# Patient Record
Sex: FEMALE | Race: WHITE | NOT HISPANIC OR LATINO | Employment: OTHER | URBAN - METROPOLITAN AREA
[De-identification: names, ages, dates, MRNs, and addresses within clinical notes are randomized per-mention and may not be internally consistent; named-entity substitution may affect disease eponyms.]

---

## 2017-02-05 ENCOUNTER — HOSPITAL ENCOUNTER (OUTPATIENT)
Dept: MEDSURG UNIT | Facility: HOSPITAL | Age: 67
End: 2017-02-06
Attending: HOSPITALIST | Admitting: HOSPITALIST

## 2017-03-21 ENCOUNTER — HOSPITAL ENCOUNTER (OUTPATIENT)
Dept: MEDSURG UNIT | Facility: HOSPITAL | Age: 67
End: 2017-03-22
Attending: INTERNAL MEDICINE | Admitting: INTERNAL MEDICINE

## 2017-04-12 ENCOUNTER — HISTORICAL (OUTPATIENT)
Dept: RESPIRATORY THERAPY | Facility: HOSPITAL | Age: 67
End: 2017-04-12

## 2017-05-01 ENCOUNTER — HISTORICAL (OUTPATIENT)
Dept: INTERNAL MEDICINE | Facility: CLINIC | Age: 67
End: 2017-05-01

## 2017-05-04 ENCOUNTER — HISTORICAL (OUTPATIENT)
Dept: INTERNAL MEDICINE | Facility: CLINIC | Age: 67
End: 2017-05-04

## 2017-05-07 ENCOUNTER — HISTORICAL (OUTPATIENT)
Dept: SLEEP MEDICINE | Facility: HOSPITAL | Age: 67
End: 2017-05-07

## 2017-05-23 ENCOUNTER — HISTORICAL (OUTPATIENT)
Dept: SLEEP MEDICINE | Facility: HOSPITAL | Age: 67
End: 2017-05-23

## 2017-06-05 ENCOUNTER — HISTORICAL (OUTPATIENT)
Dept: INTERNAL MEDICINE | Facility: CLINIC | Age: 67
End: 2017-06-05

## 2017-06-13 ENCOUNTER — HISTORICAL (OUTPATIENT)
Dept: INTERNAL MEDICINE | Facility: CLINIC | Age: 67
End: 2017-06-13

## 2017-07-12 ENCOUNTER — HISTORICAL (OUTPATIENT)
Dept: INTERNAL MEDICINE | Facility: CLINIC | Age: 67
End: 2017-07-12

## 2017-09-06 ENCOUNTER — HISTORICAL (OUTPATIENT)
Dept: INTERNAL MEDICINE | Facility: CLINIC | Age: 67
End: 2017-09-06

## 2017-09-11 ENCOUNTER — HISTORICAL (OUTPATIENT)
Dept: RADIOLOGY | Facility: HOSPITAL | Age: 67
End: 2017-09-11

## 2017-12-01 ENCOUNTER — HISTORICAL (OUTPATIENT)
Dept: NEPHROLOGY | Facility: CLINIC | Age: 67
End: 2017-12-01

## 2017-12-01 LAB
ABS NEUT (OLG): 4.78 X10(3)/MCL (ref 2.1–9.2)
ALBUMIN SERPL-MCNC: 3.8 GM/DL (ref 3.4–5)
ALBUMIN/GLOB SERPL: 1 RATIO (ref 1–2)
ALP SERPL-CCNC: 114 UNIT/L (ref 45–117)
ALT SERPL-CCNC: 28 UNIT/L (ref 12–78)
APPEARANCE, UA: ABNORMAL
AST SERPL-CCNC: 25 UNIT/L (ref 15–37)
BACTERIA #/AREA URNS AUTO: ABNORMAL /[HPF]
BASOPHILS # BLD AUTO: 0.09 X10(3)/MCL
BASOPHILS NFR BLD AUTO: 1 % (ref 0–1)
BILIRUB SERPL-MCNC: 0.7 MG/DL (ref 0.2–1)
BILIRUB UR QL STRIP: NEGATIVE
BILIRUBIN DIRECT+TOT PNL SERPL-MCNC: 0.2 MG/DL
BILIRUBIN DIRECT+TOT PNL SERPL-MCNC: 0.5 MG/DL
BUN SERPL-MCNC: 22 MG/DL (ref 7–18)
CALCIUM SERPL-MCNC: 9.1 MG/DL (ref 8.5–10.1)
CHLORIDE SERPL-SCNC: 107 MMOL/L (ref 98–107)
CHOLEST SERPL-MCNC: 165 MG/DL
CHOLEST/HDLC SERPL: 2.5 {RATIO} (ref 0–4.4)
CO2 SERPL-SCNC: 28 MMOL/L (ref 21–32)
COLOR UR: YELLOW
CREAT SERPL-MCNC: 1.3 MG/DL (ref 0.6–1.3)
CREAT UR-MCNC: 191 MG/DL
DEPRECATED CALCIDIOL+CALCIFEROL SERPL-MC: 34.78 NG/ML (ref 30–80)
EOSINOPHIL # BLD AUTO: 0.34 X10(3)/MCL
EOSINOPHIL NFR BLD AUTO: 4 % (ref 0–5)
ERYTHROCYTE [DISTWIDTH] IN BLOOD BY AUTOMATED COUNT: 12.9 % (ref 11.5–14.5)
FERRITIN SERPL-MCNC: 68.3 NG/ML (ref 8–388)
GLOBULIN SER-MCNC: 3.6 GM/ML (ref 2.3–3.5)
GLUCOSE (UA): NORMAL
GLUCOSE SERPL-MCNC: 103 MG/DL (ref 74–106)
HCT VFR BLD AUTO: 43.2 % (ref 35–46)
HDLC SERPL-MCNC: 65 MG/DL
HGB BLD-MCNC: 14.1 GM/DL (ref 12–16)
HGB UR QL STRIP: NEGATIVE
HYALINE CASTS #/AREA URNS LPF: ABNORMAL /[LPF]
IMM GRANULOCYTES # BLD AUTO: 0.03 10*3/UL
IMM GRANULOCYTES NFR BLD AUTO: 0 %
IRON SATN MFR SERPL: 24.5 % (ref 15–50)
IRON SERPL-MCNC: 84 MCG/DL (ref 50–170)
KETONES UR QL STRIP: NEGATIVE
LDLC SERPL CALC-MCNC: 78 MG/DL (ref 0–130)
LEUKOCYTE ESTERASE UR QL STRIP: NEGATIVE
LYMPHOCYTES # BLD AUTO: 1.85 X10(3)/MCL
LYMPHOCYTES NFR BLD AUTO: 24 % (ref 15–40)
MAGNESIUM SERPL-MCNC: 2.1 MG/DL (ref 1.8–2.4)
MCH RBC QN AUTO: 31.6 PG (ref 26–34)
MCHC RBC AUTO-ENTMCNC: 32.6 GM/DL (ref 31–37)
MCV RBC AUTO: 96.9 FL (ref 80–100)
MONOCYTES # BLD AUTO: 0.65 X10(3)/MCL
MONOCYTES NFR BLD AUTO: 8 % (ref 4–12)
MUCOUS THREADS URNS QL MICRO: ABNORMAL
NEUTROPHILS # BLD AUTO: 4.78 X10(3)/MCL
NEUTROPHILS NFR BLD AUTO: 62 X10(3)/MCL
NITRITE UR QL STRIP: NEGATIVE
PH UR STRIP: 6 [PH] (ref 4.5–8)
PHOSPHATE SERPL-MCNC: 2.7 MG/DL (ref 2.5–4.9)
PLATELET # BLD AUTO: 221 X10(3)/MCL (ref 130–400)
PMV BLD AUTO: 10.2 FL (ref 7.4–10.4)
POTASSIUM SERPL-SCNC: 4.5 MMOL/L (ref 3.5–5.1)
PROT SERPL-MCNC: 7.4 GM/DL (ref 6.4–8.2)
PROT UR QL STRIP: 20 MG/DL
PROT UR STRIP-MCNC: 21.7 MG/DL
PROT/CREAT UR-RTO: 113.6 MG/GM
PTH-INTACT SERPL-MCNC: 35.4 PG/ML (ref 13.8–85)
RBC # BLD AUTO: 4.46 X10(6)/MCL (ref 4–5.2)
RBC #/AREA URNS AUTO: ABNORMAL /[HPF]
SODIUM SERPL-SCNC: 143 MMOL/L (ref 136–145)
SP GR UR STRIP: 1.02 (ref 1–1.03)
SQUAMOUS #/AREA URNS LPF: >100 /[LPF]
TIBC SERPL-MCNC: 343 MCG/DL (ref 250–450)
TRANSFERRIN SERPL-MCNC: 274 MG/DL (ref 200–360)
TRIGL SERPL-MCNC: 109 MG/DL
UROBILINOGEN UR STRIP-ACNC: NORMAL MG/DL
VLDLC SERPL CALC-MCNC: 22 MG/DL
WBC # SPEC AUTO: 7.7 X10(3)/MCL (ref 4.5–11)
WBC #/AREA URNS AUTO: ABNORMAL /HPF

## 2018-05-01 ENCOUNTER — HISTORICAL (OUTPATIENT)
Dept: INTERNAL MEDICINE | Facility: CLINIC | Age: 68
End: 2018-05-01

## 2018-05-01 LAB
ABS NEUT (OLG): 3.79 X10(3)/MCL (ref 2.1–9.2)
ALBUMIN SERPL-MCNC: 3.7 GM/DL (ref 3.4–5)
ALBUMIN/GLOB SERPL: 1 RATIO (ref 1–2)
ALP SERPL-CCNC: 117 UNIT/L (ref 45–117)
ALT SERPL-CCNC: 25 UNIT/L (ref 12–78)
APPEARANCE, UA: ABNORMAL
AST SERPL-CCNC: 23 UNIT/L (ref 15–37)
BACTERIA #/AREA URNS AUTO: ABNORMAL /[HPF]
BASOPHILS # BLD AUTO: 0.07 X10(3)/MCL
BASOPHILS NFR BLD AUTO: 1 %
BILIRUB SERPL-MCNC: 0.7 MG/DL (ref 0.2–1)
BILIRUB UR QL STRIP: ABNORMAL MG/DL
BILIRUBIN DIRECT+TOT PNL SERPL-MCNC: 0.2 MG/DL
BILIRUBIN DIRECT+TOT PNL SERPL-MCNC: 0.5 MG/DL
BUN SERPL-MCNC: 24 MG/DL (ref 7–18)
CALCIUM SERPL-MCNC: 8.4 MG/DL (ref 8.5–10.1)
CHLORIDE SERPL-SCNC: 111 MMOL/L (ref 98–107)
CO2 SERPL-SCNC: 26 MMOL/L (ref 21–32)
COLOR UR: YELLOW
CREAT SERPL-MCNC: 1.3 MG/DL (ref 0.6–1.3)
CREAT UR-MCNC: 210 MG/DL
DEPRECATED CALCIDIOL+CALCIFEROL SERPL-MC: 33.56 NG/ML (ref 30–80)
EOSINOPHIL # BLD AUTO: 0.55 10*3/UL
EOSINOPHIL NFR BLD AUTO: 8 %
ERYTHROCYTE [DISTWIDTH] IN BLOOD BY AUTOMATED COUNT: 12.3 % (ref 11.5–14.5)
GLOBULIN SER-MCNC: 3.6 GM/ML (ref 2.3–3.5)
GLUCOSE (UA): ABNORMAL MG/DL
GLUCOSE SERPL-MCNC: 94 MG/DL (ref 74–106)
HCT VFR BLD AUTO: 44.9 % (ref 35–46)
HGB BLD-MCNC: 14.7 GM/DL (ref 12–16)
HGB UR QL STRIP: ABNORMAL MG/DL
HYALINE CASTS #/AREA URNS LPF: ABNORMAL /[LPF]
IMM GRANULOCYTES # BLD AUTO: 0.01 10*3/UL
IMM GRANULOCYTES NFR BLD AUTO: 0 %
KETONES UR QL STRIP: ABNORMAL MG/DL
LEUKOCYTE ESTERASE UR QL STRIP: ABNORMAL LEU/UL
LYMPHOCYTES # BLD AUTO: 2.24 X10(3)/MCL
LYMPHOCYTES NFR BLD AUTO: 31 % (ref 13–40)
MAGNESIUM SERPL-MCNC: 1.9 MG/DL (ref 1.8–2.4)
MCH RBC QN AUTO: 32.5 PG (ref 26–34)
MCHC RBC AUTO-ENTMCNC: 32.7 GM/DL (ref 31–37)
MCV RBC AUTO: 99.1 FL (ref 80–100)
MONOCYTES # BLD AUTO: 0.58 X10(3)/MCL
MONOCYTES NFR BLD AUTO: 8 % (ref 4–12)
NEUTROPHILS # BLD AUTO: 3.79 X10(3)/MCL
NEUTROPHILS NFR BLD AUTO: 52 X10(3)/MCL
NITRITE UR QL STRIP: ABNORMAL
PH UR STRIP: 5.5 [PH] (ref 4.5–8)
PHOSPHATE SERPL-MCNC: 3.1 MG/DL (ref 2.5–4.9)
PLATELET # BLD AUTO: 221 X10(3)/MCL (ref 130–400)
PMV BLD AUTO: 10.5 FL (ref 7.4–10.4)
POTASSIUM SERPL-SCNC: 4.1 MMOL/L (ref 3.5–5.1)
PROT SERPL-MCNC: 7.3 GM/DL (ref 6.4–8.2)
PROT UR QL STRIP: 10 MG/DL
PROT UR STRIP-MCNC: 15.8 MG/DL
PROT/CREAT UR-RTO: 75.2 MG/GM
PTH-INTACT SERPL-MCNC: 62.3 PG/ML (ref 18.4–80.1)
RBC # BLD AUTO: 4.53 X10(6)/MCL (ref 4–5.2)
RBC #/AREA URNS AUTO: ABNORMAL /[HPF]
SODIUM SERPL-SCNC: 137 MMOL/L (ref 136–145)
SP GR UR STRIP: 1.02 (ref 1–1.03)
SQUAMOUS #/AREA URNS LPF: >100 /[LPF]
TSH SERPL-ACNC: 2.93 MIU/L (ref 0.36–3.74)
UROBILINOGEN UR STRIP-ACNC: NORMAL MG/DL
WBC # SPEC AUTO: 7.2 X10(3)/MCL (ref 4.5–11)
WBC #/AREA URNS AUTO: ABNORMAL /HPF

## 2018-06-15 ENCOUNTER — HISTORICAL (OUTPATIENT)
Dept: RADIOLOGY | Facility: HOSPITAL | Age: 68
End: 2018-06-15

## 2018-06-20 ENCOUNTER — HISTORICAL (OUTPATIENT)
Dept: ADMINISTRATIVE | Facility: HOSPITAL | Age: 68
End: 2018-06-20

## 2018-08-01 ENCOUNTER — HISTORICAL (OUTPATIENT)
Dept: NUTRITION | Facility: HOSPITAL | Age: 68
End: 2018-08-01

## 2018-10-24 ENCOUNTER — HISTORICAL (OUTPATIENT)
Dept: RADIOLOGY | Facility: HOSPITAL | Age: 68
End: 2018-10-24

## 2018-11-08 ENCOUNTER — HISTORICAL (OUTPATIENT)
Dept: ADMINISTRATIVE | Facility: HOSPITAL | Age: 68
End: 2018-11-08

## 2018-11-08 LAB
ABS NEUT (OLG): 4.34 X10(3)/MCL (ref 2.1–9.2)
ALBUMIN SERPL-MCNC: 3.9 GM/DL (ref 3.4–5)
ALBUMIN/GLOB SERPL: 1 RATIO (ref 1–2)
ALP SERPL-CCNC: 127 UNIT/L (ref 45–117)
ALT SERPL-CCNC: 31 UNIT/L (ref 12–78)
APPEARANCE, UA: CLEAR
AST SERPL-CCNC: 26 UNIT/L (ref 15–37)
BACTERIA #/AREA URNS AUTO: ABNORMAL /[HPF]
BASOPHILS # BLD AUTO: 0.06 X10(3)/MCL
BASOPHILS NFR BLD AUTO: 1 %
BILIRUB SERPL-MCNC: 0.6 MG/DL (ref 0.2–1)
BILIRUB UR QL STRIP: NEGATIVE
BILIRUBIN DIRECT+TOT PNL SERPL-MCNC: 0.2 MG/DL
BILIRUBIN DIRECT+TOT PNL SERPL-MCNC: 0.4 MG/DL
BUN SERPL-MCNC: 25 MG/DL (ref 7–18)
CALCIUM SERPL-MCNC: 9.2 MG/DL (ref 8.5–10.1)
CHLORIDE SERPL-SCNC: 107 MMOL/L (ref 98–107)
CO2 SERPL-SCNC: 28 MMOL/L (ref 21–32)
COLOR UR: YELLOW
CREAT SERPL-MCNC: 1.4 MG/DL (ref 0.6–1.3)
CREAT UR-MCNC: 243 MG/DL
DEPRECATED CALCIDIOL+CALCIFEROL SERPL-MC: 40.08 NG/ML (ref 30–80)
EOSINOPHIL # BLD AUTO: 0.54 10*3/UL
EOSINOPHIL NFR BLD AUTO: 7 %
ERYTHROCYTE [DISTWIDTH] IN BLOOD BY AUTOMATED COUNT: 12.2 % (ref 11.5–14.5)
GLOBULIN SER-MCNC: 3.9 GM/ML (ref 2.3–3.5)
GLUCOSE (UA): NORMAL
GLUCOSE SERPL-MCNC: 102 MG/DL (ref 74–106)
HCT VFR BLD AUTO: 46.5 % (ref 35–46)
HGB BLD-MCNC: 15 GM/DL (ref 12–16)
HGB UR QL STRIP: NEGATIVE
HYALINE CASTS #/AREA URNS LPF: ABNORMAL /[LPF]
IMM GRANULOCYTES # BLD AUTO: 0.02 10*3/UL
IMM GRANULOCYTES NFR BLD AUTO: 0 %
KETONES UR QL STRIP: NEGATIVE
LEUKOCYTE ESTERASE UR QL STRIP: NEGATIVE
LYMPHOCYTES # BLD AUTO: 2.11 X10(3)/MCL
LYMPHOCYTES NFR BLD AUTO: 27 % (ref 13–40)
MAGNESIUM SERPL-MCNC: 2.1 MG/DL (ref 1.8–2.4)
MCH RBC QN AUTO: 31.8 PG (ref 26–34)
MCHC RBC AUTO-ENTMCNC: 32.3 GM/DL (ref 31–37)
MCV RBC AUTO: 98.7 FL (ref 80–100)
MONOCYTES # BLD AUTO: 0.76 X10(3)/MCL
MONOCYTES NFR BLD AUTO: 10 % (ref 4–12)
NEUTROPHILS # BLD AUTO: 4.34 X10(3)/MCL
NEUTROPHILS NFR BLD AUTO: 55 X10(3)/MCL
NITRITE UR QL STRIP: NEGATIVE
PH UR STRIP: 5.5 [PH] (ref 4.5–8)
PHOSPHATE SERPL-MCNC: 3 MG/DL (ref 2.5–4.9)
PLATELET # BLD AUTO: 235 X10(3)/MCL (ref 130–400)
PMV BLD AUTO: 10.2 FL (ref 7.4–10.4)
POTASSIUM SERPL-SCNC: 4.8 MMOL/L (ref 3.5–5.1)
PROT SERPL-MCNC: 7.8 GM/DL (ref 6.4–8.2)
PROT UR QL STRIP: 10 MG/DL
PROT UR STRIP-MCNC: 43.9 MG/DL
PROT/CREAT UR-RTO: 180.7 MG/GM
PTH-INTACT SERPL-MCNC: 54.7 PG/ML (ref 18.4–80.1)
RBC # BLD AUTO: 4.71 X10(6)/MCL (ref 4–5.2)
RBC #/AREA URNS AUTO: ABNORMAL /[HPF]
SODIUM SERPL-SCNC: 140 MMOL/L (ref 136–145)
SP GR UR STRIP: 1.03 (ref 1–1.03)
SQUAMOUS #/AREA URNS LPF: >100 /[LPF]
UROBILINOGEN UR STRIP-ACNC: NORMAL
WBC # SPEC AUTO: 7.8 X10(3)/MCL (ref 4.5–11)
WBC #/AREA URNS AUTO: ABNORMAL /HPF

## 2018-12-10 ENCOUNTER — HISTORICAL (OUTPATIENT)
Dept: INTERNAL MEDICINE | Facility: CLINIC | Age: 68
End: 2018-12-10

## 2018-12-10 LAB
ALBUMIN SERPL-MCNC: 3.7 GM/DL (ref 3.4–5)
ALBUMIN/GLOB SERPL: 1 RATIO (ref 1–2)
ALP SERPL-CCNC: 131 UNIT/L (ref 45–117)
ALT SERPL-CCNC: 28 UNIT/L (ref 12–78)
AST SERPL-CCNC: 26 UNIT/L (ref 15–37)
BILIRUB SERPL-MCNC: 0.4 MG/DL (ref 0.2–1)
BILIRUBIN DIRECT+TOT PNL SERPL-MCNC: 0.1 MG/DL
BILIRUBIN DIRECT+TOT PNL SERPL-MCNC: 0.3 MG/DL
BUN SERPL-MCNC: 26 MG/DL (ref 7–18)
CALCIUM SERPL-MCNC: 8.7 MG/DL (ref 8.5–10.1)
CHLORIDE SERPL-SCNC: 108 MMOL/L (ref 98–107)
CHOLEST SERPL-MCNC: 164 MG/DL
CHOLEST/HDLC SERPL: 3.2 {RATIO} (ref 0–4.4)
CO2 SERPL-SCNC: 28 MMOL/L (ref 21–32)
CREAT SERPL-MCNC: 1.4 MG/DL (ref 0.6–1.3)
EST. AVERAGE GLUCOSE BLD GHB EST-MCNC: 117 MG/DL
GLOBULIN SER-MCNC: 3.8 GM/ML (ref 2.3–3.5)
GLUCOSE SERPL-MCNC: 105 MG/DL (ref 74–106)
HBA1C MFR BLD: 5.7 % (ref 4.2–6.3)
HDLC SERPL-MCNC: 52 MG/DL
LDLC SERPL CALC-MCNC: 83 MG/DL (ref 0–130)
POTASSIUM SERPL-SCNC: 4.3 MMOL/L (ref 3.5–5.1)
PROT SERPL-MCNC: 7.5 GM/DL (ref 6.4–8.2)
SODIUM SERPL-SCNC: 140 MMOL/L (ref 136–145)
TRIGL SERPL-MCNC: 147 MG/DL
VLDLC SERPL CALC-MCNC: 29 MG/DL

## 2019-01-11 ENCOUNTER — HISTORICAL (OUTPATIENT)
Dept: INTERNAL MEDICINE | Facility: CLINIC | Age: 69
End: 2019-01-11

## 2019-01-11 LAB
ABS NEUT (OLG): 4.87 X10(3)/MCL (ref 2.1–9.2)
ALBUMIN SERPL-MCNC: 3.8 GM/DL (ref 3.4–5)
ALBUMIN/GLOB SERPL: 1 RATIO (ref 1–2)
ALP SERPL-CCNC: 119 UNIT/L (ref 45–117)
ALT SERPL-CCNC: 25 UNIT/L (ref 12–78)
AST SERPL-CCNC: 22 UNIT/L (ref 15–37)
BASOPHILS # BLD AUTO: 0.09 X10(3)/MCL
BASOPHILS NFR BLD AUTO: 1 %
BILIRUB SERPL-MCNC: 0.5 MG/DL (ref 0.2–1)
BILIRUBIN DIRECT+TOT PNL SERPL-MCNC: 0.2 MG/DL
BILIRUBIN DIRECT+TOT PNL SERPL-MCNC: 0.3 MG/DL
BUN SERPL-MCNC: 22 MG/DL (ref 7–18)
CALCIUM SERPL-MCNC: 8.8 MG/DL (ref 8.5–10.1)
CHLORIDE SERPL-SCNC: 108 MMOL/L (ref 98–107)
CO2 SERPL-SCNC: 30 MMOL/L (ref 21–32)
CREAT SERPL-MCNC: 1.3 MG/DL (ref 0.6–1.3)
EOSINOPHIL # BLD AUTO: 0.56 10*3/UL
EOSINOPHIL NFR BLD AUTO: 6 %
ERYTHROCYTE [DISTWIDTH] IN BLOOD BY AUTOMATED COUNT: 12.1 % (ref 11.5–14.5)
GGT SERPL-CCNC: 28 UNIT/L (ref 5–85)
GLOBULIN SER-MCNC: 3.7 GM/ML (ref 2.3–3.5)
GLUCOSE SERPL-MCNC: 107 MG/DL (ref 74–106)
HCT VFR BLD AUTO: 47 % (ref 35–46)
HGB BLD-MCNC: 15.1 GM/DL (ref 12–16)
IMM GRANULOCYTES # BLD AUTO: 0.02 10*3/UL
IMM GRANULOCYTES NFR BLD AUTO: 0 %
LYMPHOCYTES # BLD AUTO: 2.25 X10(3)/MCL
LYMPHOCYTES NFR BLD AUTO: 26 % (ref 13–40)
MCH RBC QN AUTO: 32.1 PG (ref 26–34)
MCHC RBC AUTO-ENTMCNC: 32.1 GM/DL (ref 31–37)
MCV RBC AUTO: 99.8 FL (ref 80–100)
MONOCYTES # BLD AUTO: 0.8 X10(3)/MCL
MONOCYTES NFR BLD AUTO: 9 % (ref 4–12)
NEUTROPHILS # BLD AUTO: 4.87 X10(3)/MCL
NEUTROPHILS NFR BLD AUTO: 57 X10(3)/MCL
PLATELET # BLD AUTO: 232 X10(3)/MCL (ref 130–400)
PMV BLD AUTO: 10.6 FL (ref 7.4–10.4)
POTASSIUM SERPL-SCNC: 4.4 MMOL/L (ref 3.5–5.1)
PROT SERPL-MCNC: 7.5 GM/DL (ref 6.4–8.2)
RBC # BLD AUTO: 4.71 X10(6)/MCL (ref 4–5.2)
SODIUM SERPL-SCNC: 141 MMOL/L (ref 136–145)
WBC # SPEC AUTO: 8.6 X10(3)/MCL (ref 4.5–11)

## 2019-02-14 ENCOUNTER — HISTORICAL (OUTPATIENT)
Dept: RADIOLOGY | Facility: HOSPITAL | Age: 69
End: 2019-02-14

## 2019-03-29 ENCOUNTER — HISTORICAL (OUTPATIENT)
Dept: ADMINISTRATIVE | Facility: HOSPITAL | Age: 69
End: 2019-03-29

## 2019-04-08 ENCOUNTER — HISTORICAL (OUTPATIENT)
Dept: RADIOLOGY | Facility: HOSPITAL | Age: 69
End: 2019-04-08

## 2019-05-06 ENCOUNTER — HISTORICAL (OUTPATIENT)
Dept: ADMINISTRATIVE | Facility: HOSPITAL | Age: 69
End: 2019-05-06

## 2019-06-14 ENCOUNTER — HISTORICAL (OUTPATIENT)
Dept: RESPIRATORY THERAPY | Facility: HOSPITAL | Age: 69
End: 2019-06-14

## 2019-10-09 ENCOUNTER — HISTORICAL (OUTPATIENT)
Dept: NEPHROLOGY | Facility: CLINIC | Age: 69
End: 2019-10-09

## 2019-10-25 ENCOUNTER — HISTORICAL (OUTPATIENT)
Dept: RADIOLOGY | Facility: HOSPITAL | Age: 69
End: 2019-10-25

## 2019-11-06 ENCOUNTER — HISTORICAL (OUTPATIENT)
Dept: LAB | Facility: HOSPITAL | Age: 69
End: 2019-11-06

## 2019-12-02 ENCOUNTER — HISTORICAL (OUTPATIENT)
Dept: INTERNAL MEDICINE | Facility: CLINIC | Age: 69
End: 2019-12-02

## 2020-07-15 ENCOUNTER — HISTORICAL (OUTPATIENT)
Dept: NEPHROLOGY | Facility: CLINIC | Age: 70
End: 2020-07-15

## 2020-07-22 ENCOUNTER — HISTORICAL (OUTPATIENT)
Dept: INTERNAL MEDICINE | Facility: CLINIC | Age: 70
End: 2020-07-22

## 2020-10-30 ENCOUNTER — HISTORICAL (OUTPATIENT)
Dept: RADIOLOGY | Facility: HOSPITAL | Age: 70
End: 2020-10-30

## 2021-01-18 ENCOUNTER — HISTORICAL (OUTPATIENT)
Dept: NEPHROLOGY | Facility: CLINIC | Age: 71
End: 2021-01-18

## 2021-07-08 ENCOUNTER — HISTORICAL (OUTPATIENT)
Dept: ADMINISTRATIVE | Facility: HOSPITAL | Age: 71
End: 2021-07-08

## 2021-07-13 ENCOUNTER — HISTORICAL (OUTPATIENT)
Dept: SURGERY | Facility: HOSPITAL | Age: 71
End: 2021-07-13

## 2021-07-20 ENCOUNTER — HISTORICAL (OUTPATIENT)
Dept: NEPHROLOGY | Facility: CLINIC | Age: 71
End: 2021-07-20

## 2021-08-04 ENCOUNTER — HISTORICAL (OUTPATIENT)
Dept: ADMINISTRATIVE | Facility: HOSPITAL | Age: 71
End: 2021-08-04

## 2021-08-05 ENCOUNTER — HISTORICAL (OUTPATIENT)
Dept: SURGERY | Facility: HOSPITAL | Age: 71
End: 2021-08-05

## 2021-11-04 ENCOUNTER — HISTORICAL (OUTPATIENT)
Dept: RADIOLOGY | Facility: HOSPITAL | Age: 71
End: 2021-11-04

## 2022-01-19 ENCOUNTER — HISTORICAL (OUTPATIENT)
Dept: INTERNAL MEDICINE | Facility: CLINIC | Age: 72
End: 2022-01-19

## 2022-04-07 ENCOUNTER — HISTORICAL (OUTPATIENT)
Dept: ADMINISTRATIVE | Facility: HOSPITAL | Age: 72
End: 2022-04-07

## 2022-04-23 VITALS
BODY MASS INDEX: 36.1 KG/M2 | SYSTOLIC BLOOD PRESSURE: 138 MMHG | OXYGEN SATURATION: 94 % | WEIGHT: 211.44 LBS | DIASTOLIC BLOOD PRESSURE: 88 MMHG | HEIGHT: 64 IN

## 2022-05-01 NOTE — HISTORICAL [facility] CERNER
This is a historical note converted from Tiffanie. Formatting and pictures may have been removed.  Please reference Tiffanie for original formatting and attached multimedia. Procedure Name  Date/Time:6/20/2018 11:13:03  Procedure:??Right?Knee Injection  Indications:??Diagnostic and Therapeutic Indication - decrease pain, increase range of motion and improve quality of life  RISKS: Possible complications with injection include?bleeding, infection (0.01%), tendon rupture, steroid flare, fat pad or soft tissue atrophy, skin depigmentation, and vasovagal response. ?(Steroid flair treatment is rest, ice, NSAIDs and resolves in 24-36 hours.)  Consent:???Procedure, risks, benefits, & alternatives explained to patient, who voiced understanding and agreed to proceed with procedure. ?Consent signed and?scanned into the medical record. No absolute contraindications (cellulitis overlying joint, infection, lack of informed consent, allergy to injection mediation, justo protein or egg allergy for sodium hyaluronate, or history of steroid flare) or relative contraindications (brittle or out of control DM HgA1c > 10, coagulopathy INR > 3.5, previous joint replacement, or history of AVN).  Description:?Time out performed. The patient was prepped?using Chlorhexidine scrub after the appropriate?identification of anatomic landmarks.? Sterile needle used (size # 21 gauge, length 1.5 inch)?Topical anesthetic of ethyl chloride was used.? ?5 mL of 1% lidocaine plain with 40 mg of Kenalog injected.  Complications:?None?  EBL:??None  Post Procedure:?Patient reports improvement in pain and ROM. Patient alert, moving all extremities. ?Good peripheral pulses, no signs of vascular compromise, range of motion intact. ?Patient tolerated procedure well. ?Aftercare instructions were given to patient at time of discharge.??Relative rest for 3 days - avoiding excessive activity. ?Pendulum stretching exercises followed by stretching exercises  daily?starting on day 4.? Place ice on area for 15 minutes every 4 to 6 hours.??Tylenol 1000mg twice a day or ibuprofen 600 mg three times per day for next 3-4 days if not on medication already. ?Protect the area for the next 1-8 hours if anesthetic was used. ?Avoid excessive activity for next 3 to 4 weeks.?ER precautions for fever, severe joint pain, or allergic reaction or other new symptoms related to joint injection.

## 2022-05-01 NOTE — HISTORICAL [facility] CERNER
This is a historical note converted from Tiffanie. Formatting and pictures may have been removed.  Please reference Tiffanie for original formatting and attached multimedia. Chief Complaint  Referred for Rt. knee pain  History of Present Illness  67?yo?female?non-smoker?presents to ortho clinic for?routine follow up?for?right?knee pain.? Patient points to ?medial knee.  Onset: ?Insidious over years??progressively worsening  Current pain level: 4/10??without pain medication. Quality described as?sharp?Patient reports using?RX / OTC?(Tramadol RX per PCP)?medications PRN pain with?mild?relief.? Patient?denies?use of pain medication today.?Patient will not be able to get more Tramadol as requesting medication, will RX compound cream for symptom relief.  Modifying Factors: ?Worse with/after activity;Improved with rest;??Stiffness after immobilization??Stiffness improved with less than 30 minutes of activity  Previous treatment:Conservative treatment for at least 3 months with HEP/ medications.?CSI x 1 in past with good relief lasting short period, requesting another injection today.  Previous injuries:?Denies  Associated Symptoms:?Crepitus/Grinding; ?No numbness or tingling;??swelling with?activity;?No skin changes;?No weakness;?Mild decrease in ROM  Activity:?Sedentary, full ADLs;?Pain interferes with function/daily activity (mild)?Patient?denies?use of assistive devices for assistance with ambulation.  Family History:?Family history of arthritis  PCP:?Soledad Delatorre NP [facility]  Employment:Worked in a lab now retired.  ?   NOTE: No NSAIDs due to having donated a kidney in past?and can not take.  Review of Systems  Constitutional:No fever;No chills;No weight loss  CV:No swelling;No edema  GI:No urinary retention;No urinary incontinence  :No fecal incontinence  Skin:No rash;No wound  Neuro:No numbness/tingling;No weakness;No saddle anesthesia  MSK: As above  Psych:No depression;No anxiety  Heme/Lymph:No easy bruising;No  easy bleeding;No lymphadenopathy  Immuno:No MRSA history  Physical Exam  Vitals & Measurements  HT:?160.02?cm? HT:?160.02?cm? WT:?95.4?kg? WT:?95.4?kg? BMI:?37.26?  MSK: ?Right??KNEE  General: No apparent distress;?obese  Inspection:?Normal gait/ station;??full weight bearing;??no swelling;?no erythema;?No contusion;?atrophy / deconditioning noted- mild quad;?normal alignment?  Palpation:?tenderness noted at lateral and medial joint lines;?Crepitus:?Positive;?Normal temperature  ROM:?  ?????Active Extension/Flexion (0-140):?5-115  Strength:?  ?????Flexion??4/5  ?????Extension??4/5  Special Tests:  ?????Ballotable Effusion: ?Negative  ?????Fluid Wave:?Negative  ?????Anterior Drawer:Negative  ?????Posterior Drawer:?Negative  ?????Patellar Grind: ?Negative  ?????Cori:?Negative?  ?????Apleys Compression:?Negative  Neurovascular:?Intact; 2+?distal pulse, sensation intact to light touch  Neuro/Psych: Awake, Alert, Oriented; Normal mood and affect  Lymphatic: No LAD  Skin and Soft Tissue: No bruising, No ecchymosis; No rash; Skin intact  Assessment/Plan  1.?Osteoarthritis of right knee  ?1.? Discussed with patient diagnosis and treatment recommendations.? Handout given.  2.? Imaging:?Radiological studies ordered and independently reviewed; discussed with patient.  3.? Treatment plan: Conservative treatment to include oral glucosamine 1500 mg/day; Topical capsaicin as needed; Hot or cold therapy; Adequate vitamin C/D intake  4.? Procedure:?Discussed CSI vs VS injections as treatment options; since conservative measures did not improve symptoms patient consented for CSI today  5.? Activity:?Activity as tolerated; HEP to included aerobic conditioning with non-painful activity and ROM/STG exercises; proper footwear; assistive device to avoid limping;rubber band provided for HEP and soft knee splint provided for PRN comfort  6.? Therapy: patient will try water aerobics  7.? Medication:?First line treatment with  daily?acetaminophen 1000 mg three times daily; Medication precautions given; RX topical compound cream, can not take NSAIDs.  8.? RTC:?3 months  9.? Additional work-up:?None  Ordered:  Lidocaine inj., 5 mL, form: Injection, Intra-Articular, Once, first dose 06/20/18 11:03:00 CDT, stop date 06/20/18 11:03:00 CDT  triamcinolone, 40 mg, form: Injection, Intra-Articular, Once, first dose 06/20/18 11:03:00 CDT, stop date 06/20/18 11:03:00 CDT  asp/inj jnt/bursa, major 20610 PC, 06/20/18 11:03:00 CDT, [facility], Routine, 06/20/18 11:03:00 CDT  Clinic Follow up, *Est. 09/20/18 3:00:00 CDT, Order for future visit, Osteoarthritis of right knee, [facility]  Office/Outpatient Visit Level 4 Established 22027 PC, 25, Osteoarthritis of right knee, [facility], 06/20/18 11:03:00 CDT  XR Knee Right 4 or More Views, Routine, 06/20/18 11:00:00 CDT, Pain, None, Stretcher, Patient Has IV?, Patient on Oxygen?, Rad Type, Osteoarthritis of right knee, 06/20/18 11:00:00 CDT  ?  3.?HTN - Hypertension  1.??Educated patient to reduce salt in diet?and?limit alcohol consumption to less than 1 ounce per day  2.? Patient instructed?to take?medications that have been prescribed for HTN?as instructed  3.? Follow up with PCP for management and care of HTN?  ?  Orders:  Misc Prescription, keto/flex/lido gel 10%/2%/5%, 1 mL, TOP, TID, 4 clicks (1mL) to affected area TID as needed (Dr. Bledsoe), # 30 gm, 5 Refill(s), Pharmacy: [facility]   Problem List/Past Medical History  Ongoing  Anxiety  Arthritis(  Confirmed  )  Chronic bronchitis  Chronic bronchitis(  Confirmed  )  Chronic renal disease(  Confirmed  )  CKD (chronic kidney disease) stage 3, GFR 30-59 ml/min  COPD with acute exacerbation  HLD - Hyperlipidemia  HTN (hypertension)(  Confirmed  )  HTN - Hypertension  Hyperlipidemia(  Confirmed  )  Obesity  DARRON (obstructive sleep apnea)  Psoriasis  Tobacco user  Vitamin D deficiency  Historical  No qualifying  data  Procedure/Surgical History  Removal of kidney from donor (2002), Total hysterectomy (1997), Hysterectomy, Kidney - local excision.  Medications  Advair Diskus 100 mcg-50 mcg inhalation powder, 1 puff(s), INH, BID, 6 refills  albuterol 3 mg- ipratropium 0.5 mg/3 mL inhalation NEB solution, 3 mL, INH, QID, 6 refills  aspirin 81 mg oral tablet, CHEWABLE, 1 TABLET, Oral, Daily, 4 refills  atorvastatin 40 mg oral tablet, 40 mg= 1 tab(s), Oral, Daily, 2 refills  Calcium 600+D, Oral, Daily  Home concentrator and portable @ 2L per nasal cannula continuous to keep sats at 88-92%, See Instructions  IPRATROPIUM/ALBUTERO 0.5-3MG/3ML SOL, NEB, QID  Kenalog-40 injectable suspension, 40 mg= 1 mL, Intra-Articular, Once  keto/flex/lido gel 10%/2%/5%, 1 mL, TOP, TID, 5 refills  Lidocaine 1% PF 5ml inj, 5 mL, Intra-Articular, Once  losartan 25 mg oral tablet, 25 mg= 1 tab(s), Oral, Daily, 2 refills  Nebulizer Machine, See Instructions  ProAir HFA 90 mcg/inh inhalation aerosol with adapter, 1 PUFF, INH, q6hr, 6 refills  Spiriva 18 mcg inhalation capsule, 18 mcg= 1 cap(s), INH, Daily, 7 refills  traMADOL HCL 50MG TAB, 50 mg= 1 tab(s), Oral, q4hr  triamcinolone 0.1% topical cream, 1 daria, TOP, TID, 3 refills  Zyrtec 10 mg oral tablet, 1 TABLET, Oral, Daily  Allergies  No Known Medication Allergies  Social History  Alcohol - Denies Alcohol Use, 06/25/2014  Never, 06/20/2016  Employment/School  Retired, Highest education level: Some college. Operates hazardous equipment: No., 11/20/2015  Exercise  Exercise frequency: 1-2 times/week. Self assessment: Good condition. Exercise type: Walking., 11/20/2015  Home/Environment  Lives with Spouse. Living situation: Home/Independent. Alcohol abuse in household: No. Substance abuse in household: No. Smoker in household: No. Injuries/Abuse/Neglect in household: No. Feels unsafe at home: No. Family/Friends available for support: Yes. Concern for family members at home: No. Major illness in  household: No. Financial concerns: No. TV/Computer concerns: No., 11/20/2015  Nutrition/Health  Type of diet: low sodium., 02/09/2018  cardiac diet, Wants to lose weight: No. Sleeping concerns: No. Feels highly stressed: No., 11/20/2015  Substance Abuse - Denies Substance Abuse, 06/25/2014  Never, 06/20/2016  Tobacco  Former smoker, 02/05/2017  Family History  Acute myocardial infarction.: Father.  Alcoholism.: Brother.  Cancer: Mother.  Heart failure.: Father.  Immunizations  Vaccine Date Status Comments   influenza virus vaccine, inactivated 10/27/2017 Given    pneumococcal 23-polyvalent vaccine 05/30/2017 Given    tetanus/diphtheria/pertussis, acel(Tdap) 01/05/2017 Given    influenza virus vaccine, inactivated 10/15/2016 Recorded    influenza virus vaccine, inactivated 11/20/2015 Given pt kishan well voices no complaints. no visible distress noted   pneumococcal vacc 10/11/2006 Recorded    Health Maintenance  Health Maintenance  ???Pending?(in the next year)  ??? ??OverDue  ??? ? ? ?Alcohol Misuse Screening due??01/05/18??and every 1??year(s)  ??? ??Due?  ??? ? ? ?Colorectal Screening due??06/13/18??and every 1??year(s)  ??? ? ? ?Functional Assessment due??06/20/18??and every 1??year(s)  ??? ? ? ?Pneumococcal Vaccine due??06/20/18??and every 100??year(s)  ??? ??Due In Future?  ??? ? ? ?Influenza Vaccine not due until??10/02/18??and every 1??year(s)  ??? ? ? ?Smoking Cessation not due until??10/25/18??and every 1??year(s)  ??? ? ? ?Hypertension Management-Blood Pressure not due until??11/30/18??and every 6??month(s)  ??? ? ? ?Hypertension Management-BMP not due until??05/01/19??and every 1??year(s)  ??? ? ? ?Aspirin Therapy for CVD Prevention not due until??05/31/19??and every 1??year(s)  ??? ? ? ?Hypertension Management-Education not due until??05/31/19??and every 1??year(s)  ??? ? ? ?Blood Pressure Screening not due until??05/31/19??and every 1??year(s)  ??? ? ? ?Depression Screening not due until??05/31/19??and  every 1??year(s)  ???Satisfied?(in the past 1 year)  ??? ??Satisfied?  ??? ? ? ?Aspirin Therapy for CVD Prevention on??05/31/18.??Satisfied by Soledad Chaudhari??Reason: Expectation Satisfied Elsewhere  ??? ? ? ?Blood Pressure Screening on??05/31/18.??Satisfied by Chanelle Larson LPN S.  ??? ? ? ?Body Mass Index Check on??06/20/18.??Satisfied by Maryanne Chan LPN  ??? ? ? ?Bone Density Screening on??06/15/18.??Satisfied by Ailin Hobson  ??? ? ? ?Breast Cancer Screening on??07/12/17.??Satisfied by Sneha Trejo  ??? ? ? ?Depression Screening on??05/31/18.??Satisfied by Chanelle Larson LPN S.  ??? ? ? ?Diabetes Screening on??11/30/18.??Satisfied by Soledad Chaudhari  ??? ? ? ?Fall Risk Assessment on??06/20/18.??Satisfied by Maryanne Chan LPN  ??? ? ? ?Hypertension Management-Blood Pressure on??05/31/18.??Satisfied by Chanelle Larson LPN S.  ??? ? ? ?Influenza Vaccine on??10/27/17.??Satisfied by Nancy Brenner LPN  ??? ? ? ?Lipid Screening on??12/01/17.??Satisfied by Geno Escalona  ??? ? ? ?Obesity Screening on??06/20/18.??Satisfied by Maryanne Chan LPN  ??? ? ? ?Smoking Cessation on??10/25/17.??Satisfied by Maryanne Chan LPN  ??? ? ? ?Tobacco Use Screening on??06/20/18.??Satisfied by Maryanne Chan LPN  ?  ?  Diagnostic Results  XR right knee completed 6/20/18; DJD noted; awaiting radiologist findings      6/20/18 Radiology Report  Right knee 3 views.  HISTORY: Pain.  FINDINGS: Examination reveals normal mineralization of the visualized  osseous structures there is narrowing of the medial compartment of the  knee joint with tiny marginal osteophytes articular spaces are  otherwise preserved with smooth articular surfaces no acute fractures  or dislocations identified.  IMPRESSION: Degenerative changes with narrowing of the medial  compartment of the knee joint

## 2022-05-01 NOTE — HISTORICAL [facility] C[facility]ER
This is a historical note converted from Tiffanie. Formatting and pictures may have been removed.  Please reference Cernestor for original formatting and attached multimedia. Chief Complaint  Headaches, yeast infection  History of Present Illness  68 y.o.  female with PMHx significant for HTN, HLD, CKD Stg III, Solitary Kidney (kidney donor), COPD and DARRON, presenting with c/o HA, chills, and fever. She was seen in the ED 3/21/19 with the same complaints + sinus pressure/congestion and was diagnosed with sinusitis. States that symptoms actually began ~ 2-3 days prior to visit.?Influenza testing was negative. Pt was prescribed Augmentin 875 1 tab po BID x 7 days and Fluticasone nasal spray for use BID. After that visit, she was seen by her dentist to have teeth pulled. Dentist also prescribed Amoxicillin and gave her prescription for Norco. Despite the abx therapy, pt continues with c/o HAs that begin in the occipital region on the left side. Describes pain as a deep, excruciating ache. Unrelieved with OTC medications such as Tylenol and Ibuprofen. States that Norco is the only thing that gave her some relief, however, she does not wish to take the Norco all the time.? Denies changes in vision, thunderclap onset, N/V, awakening in the middle of the night, HA upon awakening, confusion or mental status/LOC changes. She states that she remembers years ago while living in [place], she experienced the exact same type of HA, only on both sides of her head in the occipital region. She reports that the doctors there prescribed her some anti-seizure medication and within 2 days, the HA resolved. She continues with low-grade fevers/chills that mainly occur at night. Afebrile at present. She does have a cough but attributes the cough to her COPD. Pt c/o thick, white vaginal discharge and vaginal itching that began after starting the Augmentin. Denies CP or SOB. No other problems stated.  ?  Review of Systems  Constitutional:?no  weakness; + fever, fatigue  Eye:?no vision loss, eye redness, drainage, or pain  ENMT:?no sore throat, ear pain,?sinus pain/congestion, nasal congestion/drainage  Respiratory:?+ cough, no wheezing, no shortness of breath  Cardiovascular:?no chest pain, no palpitations, no edema  Gastrointestinal:?no nausea, vomiting, or diarrhea. No abdominal pain  Genitourinary:?no dysuria, no urinary frequency or urgency, no hematuria; + vaginal discharge/itching  Hema/Lymph:?no abnormal bruising or bleeding  Endocrine:?no heat or cold intolerance, no excessive thirst or excessive urination  Musculoskeletal:?no muscle or joint pain, no joint swelling  Integumentary:?no skin rash or abnormal lesion  Neurologic: no headache, no seizure, no dizziness, no weakness or numbness  Physical Exam  Vitals & Measurements  T:?36.5? ?C (Oral)? HR:?88(Peripheral)? RR:?20? BP:?125/75?  HT:?159?cm? WT:?95.7?kg? BMI:?37.85?  General:?well-developed, well-nourished, no acute distress  Eye: PERRLA, EOMI, clear conjunctiva, eyelids normal  HENT:?TMs/ear canals clear, oropharynx with mild?erythema, no exudate,?mucosal surfaces moist, no maxillary/frontal sinus tenderness to palpation  Neck: full range of motion, no thyromegaly or lymphadenopathy  Respiratory:?Fine crackles noted to posterior left lower lobe. Lungs otherwise CTA. No wheezing. Chest expansion symmetrical  Cardiovascular:?regular rate and rhythm without murmurs, gallops, or rubs, peripheral pulses normal. No peripheral swelling  Gastrointestinal:?soft, non-tender, non-distended with normal bowel sounds  Musculoskeletal:?full range of motion of all extremities/spine without limitation or discomfort.?Normal strength and tone  Integumentary: no rashes or skin lesions present  Neurologic: cranial nerves I-XII?intact, no signs of peripheral neurological deficit, motor/sensory function intact  Psychological: Calm and cooperative. Affect and Mood appropriate. Judgement  intact  Assessment/Plan  Candidal vaginitis  Fluconazole 150 mg po x 1  May repeat dose in 72 hrs if symptoms not resolved  Ordered:  1160F- Medication reconciliation completed during visit  Office/Outpatient Visit Level 3 Established 10275 PC  ?  Fever and chills  BMP  CBC  Mycoplasma  CXR today?  Ordered:  1160F- Medication reconciliation completed during visit  Basic Metabolic Panel  CBC w/ Auto Diff  Mycoplasma pneumoniae, IgG Ab-[facility] [id number]  Office/Outpatient Visit Level 3 Established 93592 PC  XR Chest 2 Views  ?  Occipital headache  Headache: Denies changes in vision, thunderclap onset, N/V, awakening in the middle of the night, HA upon awakening, confusion or mental status/LOC changes  Keep a daily HA log. If greater than 3 HAs/week, will consider prophylaxis with Topamax  Fioricet PRN  ED precautions: Go to the nearest emergency room for any change in intensity or pattern of HA or if associated with mental status changes/LOC. Verb Understanding  CT Head W/O contrast  Ordered:  acetaminophen/butalbital/caffeine, 1 tab(s), Oral, q4hr, PRN PRN as needed, X 7 day(s), # 30 tab(s), 0 Refill(s), Pharmacy: [facility]  1160F- Medication reconciliation completed during visit  CT Head W/O Contrast  Office/Outpatient Visit Level 3 Established 36308 PC  ?  Keep scheduled appt in May  RTC PRN   Problem List/Past Medical History  Ongoing  Anxiety  Arthritis(  Confirmed  )  Chronic bronchitis(  Confirmed  )  CKD (chronic kidney disease) stage 3, GFR 30-59 ml/min  HLD - Hyperlipidemia  HTN - Hypertension  Obesity  DARRON (obstructive sleep apnea)  Psoriasis  Vitamin D deficiency  Historical  Chronic bronchitis  Chronic renal disease(  Confirmed  )  COPD with acute exacerbation  Hyperlipidemia(  Confirmed  )  Tobacco user  Procedure/Surgical History  Removal of kidney from donor (2002)  Total hysterectomy (1997)  Hysterectomy  Kidney - local excision   Medications  acetaminophen/butalbital/caffeine 325  mg-50 mg-40 mg oral tablet, 1 tab(s), Oral, q4hr, PRN  Advair Diskus 100 mcg-50 mcg inhalation powder, 1 puff(s), INH, BID, 6 refills  albuterol 3 mg- ipratropium 0.5 mg/3 mL inhalation NEB solution, 3 mL, INH, QID, PRN, 3 refills  albuterol-ipratropium 2.5 mg-0.5 mg/3 mL inhalation solution  aspirin 81 mg oral tablet, CHEWABLE, 1 TABLET, Oral, Daily, 4 refills  atorvastatin 40 mg oral tablet, 40 mg= 1 tab(s), Oral, Daily, 1 refills  Calcium 600+D, Oral, Daily  fluticasone 50 mcg/inh nasal spray, 2 spray(s), Nasal, BID  Home concentrator and portable @ 2L per nasal cannula continuous to keep sats at 88-92%, See Instructions  hydrocodone-ibuprofen 7.5 mg-200 mg oral tablet, 1 tab(s), Oral, q4hr, PRN  losartan 25 mg oral tablet, 25 mg= 1 tab(s), Oral, Daily, 1 refills  Nebulizer Machine, See Instructions  ProAir HFA 90 mcg/inh inhalation aerosol with adapter, 1 PUFF, INH, q6hr, PRN, 3 refills  Spiriva 18 mcg inhalation capsule, 18 mcg= 1 cap(s), INH, Daily, 7 refills,? ?Still taking, not as prescribed: PRN  triamcinolone 0.1% topical cream, 1 daria, TOP, TID, 3 refills,? ?Still taking, not as prescribed: PRN  Zyrtec 10 mg oral tablet, 1 TABLET, Oral, Daily  Allergies  No Known Medication Allergies  Social History  Alcohol - Denies Alcohol Use, 06/25/2014  Never, 06/20/2016  Employment/School  Retired, Highest education level: Some college. Operates hazardous equipment: No., 11/20/2015  Exercise  Exercise frequency: 1-2 times/week. Self assessment: Good condition. Exercise type: Walking., 11/20/2015  Home/Environment  Lives with Spouse. Living situation: Home/Independent. Alcohol abuse in household: No. Substance abuse in household: No. Smoker in household: No. Injuries/Abuse/Neglect in household: No. Feels unsafe at home: No. Family/Friends available for support: Yes. Concern for family members at home: No. Major illness in household: No. Financial concerns: No. TV/Computer concerns: No.,  11/20/2015  Nutrition/Health  Type of diet: low sodium., 02/09/2018  cardiac diet, Wants to lose weight: No. Sleeping concerns: No. Feels highly stressed: No., 11/20/2015  Sexual  Gender Identity Choose not to disclose., 02/04/2019  Substance Abuse - Denies Substance Abuse, 06/25/2014  Never, 06/20/2016  Tobacco  Former smoker, quit more than 30 days ago, N/A, 03/29/2019  Family History  Acute myocardial infarction.: Father.  Alcoholism.: Brother.  Cancer: Mother.  Heart failure.: Father.  Immunizations  Vaccine Date Status Comments   influenza virus vaccine, inactivated 10/12/2018 Given tolerated well   influenza virus vaccine, inactivated 10/27/2017 Given    pneumococcal 23-polyvalent vaccine 05/30/2017 Given    tetanus/diphtheria/pertussis, acel(Tdap) 01/05/2017 Given    influenza virus vaccine, inactivated 10/15/2016 Recorded    influenza virus vaccine, inactivated 11/20/2015 Given pt kishan well voices no complaints. no visible distress noted   pneumococcal vacc 10/11/2006 Recorded    Health Maintenance  Health Maintenance  ???Pending?(in the next year)  ??? ??OverDue  ??? ? ? ?Pneumococcal Vaccine due??and every?  ??? ??Due?  ??? ? ? ?COPD Maintenance-Spirometry due??03/29/19??and every?  ??? ? ? ?Pneumococcal Vaccine due??03/29/19??Variable frequency  ??? ??Due In Future?  ??? ? ? ?Aspirin Therapy for CVD Prevention not due until??05/31/19??and every 1??year(s)  ??? ? ? ?Hypertension Management-Education not due until??05/31/19??and every 1??year(s)  ??? ? ? ?Advance Directive not due until??05/31/19??and every 1??year(s)  ??? ? ? ?Cognitive Screening not due until??11/26/19??and every 1??year(s)  ??? ? ? ?Alcohol Misuse Screening not due until??11/26/19??and every 1??year(s)  ??? ? ? ?Colorectal Screening ([facility]) not due until??12/10/19??and every 1??year(s)  ??? ? ? ?Hypertension Management-BMP not due until??01/11/20??and every 1??year(s)  ??? ? ? ?Fall Risk Assessment not due until??02/04/20??and  every 1??year(s)  ??? ? ? ?ADL Screening not due until??02/04/20??and every 1??year(s)  ??? ? ? ?Geriatric Depression Screening not due until??02/04/20??and every 1??year(s)  ??? ? ? ?Functional Assessment not due until??02/26/20??and every 1??year(s)  ??? ? ? ?Hypertension Management-Blood Pressure not due until??03/28/20??and every 1??year(s)  ???Satisfied?(in the past 1 year)  ??? ??Satisfied?  ??? ? ? ?ADL Screening on??02/04/19.??Satisfied by Jeannie Abdalla LPN  ??? ? ? ?Advance Directive on??05/31/18.??Satisfied by Chanelle Larson LPN  ??? ? ? ?Alcohol Misuse Screening on??11/26/18.??Satisfied by Teaj Alejo NP  ??? ? ? ?Aspirin Therapy for CVD Prevention on??05/31/18.??Satisfied by Soledad Chaudhari??Reason: Expectation Satisfied Elsewhere  ??? ? ? ?Blood Pressure Screening on??03/29/19.??Satisfied by Mayra Lackey LPN.  ??? ? ? ?Body Mass Index Check on??03/29/19.??Satisfied by Mayra Lackey LPN.  ??? ? ? ?Bone Density Screening on??06/15/18.??Satisfied by Ailin Hobson  ??? ? ? ?Breast Cancer Screening ([facility]) on??10/24/18.??Satisfied by Sneha Trejo  ??? ? ? ?Cognitive Screening on??11/26/18.??Satisfied by Teja Alejo NP  ??? ? ? ?Colorectal Screening ([facility]) on??12/09/18.??Satisfied by Carmela Galvez  ??? ? ? ?Depression Screening on??03/29/19.??Satisfied by Mayra Lackey LPN.  ??? ? ? ?Diabetes Screening on??01/11/19.??Satisfied by Daniela Kearney  ??? ? ? ?Fall Risk Assessment on??02/04/19.??Satisfied by Jeannie Abdalla LPN  ??? ? ? ?Functional Assessment on??02/26/19.??Satisfied by Mayra Lackey LPN.  ??? ? ? ?Geriatric Depression Screening on??02/04/19.??Satisfied by Jeannie Abdalla LPN  ??? ? ? ?Hypertension Management-Blood Pressure on??03/29/19.??Satisfied by Mayra Lackey LPN.  ??? ? ? ?Influenza Vaccine on??10/12/18.??Satisfied by Shannon Guy LPN  ??? ? ? ?Lipid Screening on??12/10/18.??Satisfied by  Daniela Kearney  ??? ? ? ?Obesity Screening on??03/29/19.??Satisfied by Mayra Lackey LPN  ??? ??Canceled?  ??? ? ? ?Smoking Cessation on??11/26/18.?Recorded by Melo LOTT, Teja?Reason: Expectation Not Necessary  ?  ?  Lab Results  Test Name Test Result Date/Time   Influ A Ag Negative 03/21/2019 18:28 CDT   Influ B Ag Negative 03/21/2019 18:28 CDT

## 2022-05-02 NOTE — HISTORICAL [facility]
This is a historical note converted from Tiffanie. Formatting and pictures may have been removed.  Please reference Tiffanie for original formatting and attached multimedia. HPI: PT here for CE/IOL OS. Denies changes in health or medications since the patient was last seen in clinic.  ?   ROS: Negative x 10  ?   SLE:  Ext: wnl OU  L/L: wnl OU  C/S: white and quiet OU  K: clear OU  AC: deep and quiet OU  I: round and reactive OU  L: cataract OS  ?   General NAP  HENT atraumatic  Eyes: cataract  Neck: nontender, no masses or thyromegaly  Respiratory: no distress on room air  Cardiovascular: regular rate  Gastrointestinal: no hepatomegaly  Lymphatics: no lymphadenopathy  Musculoskeletal: wnl  ?  Assessment/Plan  1. Visually significant cataract OS  - Pt complains of decreased vision  - Calcs obtained previously, review calcs before selecting lens  - After extensive discussion of R/B/A, informed consent was signed in clinic and reviewed today  - Plan for CE/IOL OS today  ?  Patient seen and examined. ?Agree with resident plan. ?Treatment plan reasonable and appropriate. ?  ?

## 2022-07-08 DIAGNOSIS — N18.9 CHRONIC KIDNEY DISEASE, UNSPECIFIED CKD STAGE: Primary | ICD-10-CM

## 2022-07-18 RX ORDER — BENZONATATE 100 MG/1
100 CAPSULE ORAL 3 TIMES DAILY
COMMUNITY
Start: 2022-01-27 | End: 2022-07-25 | Stop reason: SDUPTHER

## 2022-07-18 RX ORDER — IPRATROPIUM BROMIDE AND ALBUTEROL SULFATE 2.5; .5 MG/3ML; MG/3ML
3 SOLUTION RESPIRATORY (INHALATION) 4 TIMES DAILY PRN
COMMUNITY
Start: 2022-01-27 | End: 2023-04-25 | Stop reason: SDUPTHER

## 2022-07-18 RX ORDER — ATORVASTATIN CALCIUM 40 MG/1
40 TABLET, FILM COATED ORAL DAILY
COMMUNITY
Start: 2021-07-26 | End: 2023-01-25 | Stop reason: HOSPADM

## 2022-07-18 RX ORDER — CEPHALEXIN 250 MG/1
1 CAPSULE ORAL 2 TIMES DAILY
COMMUNITY
Start: 2022-02-09 | End: 2022-07-25 | Stop reason: SDUPTHER

## 2022-07-18 RX ORDER — OMEPRAZOLE 20 MG/1
20 CAPSULE, DELAYED RELEASE ORAL DAILY
COMMUNITY
Start: 2022-02-09 | End: 2022-07-25 | Stop reason: SDUPTHER

## 2022-07-18 RX ORDER — ALBUTEROL SULFATE 90 UG/1
1 AEROSOL, METERED RESPIRATORY (INHALATION) EVERY 6 HOURS PRN
COMMUNITY
Start: 2022-01-27 | End: 2023-11-28 | Stop reason: SDUPTHER

## 2022-07-18 RX ORDER — LOSARTAN POTASSIUM 25 MG/1
25 TABLET ORAL DAILY
COMMUNITY
Start: 2022-06-28 | End: 2022-09-26

## 2022-07-18 RX ORDER — DICLOFENAC SODIUM 10 MG/G
GEL TOPICAL 4 TIMES DAILY PRN
COMMUNITY
Start: 2022-01-07

## 2022-07-18 RX ORDER — PREDNISONE 20 MG/1
20 TABLET ORAL DAILY
COMMUNITY
Start: 2022-02-09 | End: 2022-07-25

## 2022-07-21 ENCOUNTER — APPOINTMENT (OUTPATIENT)
Dept: LAB | Facility: HOSPITAL | Age: 72
End: 2022-07-21
Attending: NURSE PRACTITIONER

## 2022-07-21 DIAGNOSIS — R73.03 PREDIABETES: Primary | ICD-10-CM

## 2022-07-21 LAB
EST. AVERAGE GLUCOSE BLD GHB EST-MCNC: 111.2 MG/DL
HBA1C MFR BLD: 5.5 %

## 2022-07-21 PROCEDURE — 36415 COLL VENOUS BLD VENIPUNCTURE: CPT

## 2022-07-21 PROCEDURE — 83036 HEMOGLOBIN GLYCOSYLATED A1C: CPT

## 2022-07-22 PROBLEM — M17.12 OSTEOARTHRITIS OF LEFT KNEE: Status: ACTIVE | Noted: 2022-07-22

## 2022-07-22 PROBLEM — E78.5 HYPERLIPIDEMIA: Status: ACTIVE | Noted: 2022-07-22

## 2022-07-22 PROBLEM — J44.9 CHRONIC OBSTRUCTIVE PULMONARY DISEASE: Status: ACTIVE | Noted: 2022-07-22

## 2022-07-22 PROBLEM — L40.9 PSORIASIS: Status: ACTIVE | Noted: 2022-07-22

## 2022-07-22 PROBLEM — N18.30 STAGE 3 CHRONIC KIDNEY DISEASE: Status: ACTIVE | Noted: 2022-07-22

## 2022-07-22 PROBLEM — R73.02 IMPAIRED GLUCOSE TOLERANCE: Status: ACTIVE | Noted: 2022-07-22

## 2022-07-22 PROBLEM — E55.9 VITAMIN D DEFICIENCY: Status: ACTIVE | Noted: 2022-07-22

## 2022-07-22 PROBLEM — I10 HYPERTENSION: Status: ACTIVE | Noted: 2022-07-22

## 2022-07-22 PROBLEM — E66.9 OBESITY: Status: ACTIVE | Noted: 2022-07-22

## 2022-07-22 PROBLEM — J45.909 ASTHMA: Status: ACTIVE | Noted: 2022-07-22

## 2022-07-22 PROBLEM — Z00.00 WELLNESS EXAMINATION: Status: ACTIVE | Noted: 2022-07-22

## 2022-07-22 PROBLEM — G47.33 OBSTRUCTIVE SLEEP APNEA SYNDROME: Status: ACTIVE | Noted: 2022-07-22

## 2022-07-22 NOTE — ASSESSMENT & PLAN NOTE
Following [facility]. Scheduled in [facility] today, but labs were not drawn by lab despite pt coming to lab last week. She will contact [facility] after this appt to reschedule

## 2022-07-22 NOTE — PROGRESS NOTES
"  BIRGIT Stafford   [facility]  [address] [address]      PATIENT NAME: Marisel Resendez  : 1950  DATE: 22  MRN: [id number]      Billing Provider: BIRGIT Stafford  Level of Service:   Patient PCP Information     Provider PCP Type    BIRGIT Stafford General          Reason for Visit / Chief Complaint: Follow-up (Lab review)       History of Present Illness / Problem Focused Workflow     Marisel Resendez presents to the clinic with Follow-up (Lab review)     Initial Visit (18): 68 y.o.  female presenting to the clinic to re-establish primary care. Previous PCP MELLY Delatorre NP. PMHx significant for HTN, HLD, CKD Stg III, Solitary Kidney (kidney donor), COPD and DARRON. Seen in Ortho Clinic in 2018 for R knee pain. Bp at goal today. Currently taking Losartan 25 mg po daily. Denies ADR. No refills needed at this time. FLP needed. LDL 78 (2017). Currently taking Atorvastatin 40 mg po daily. Tolerating well. Following Renal Clinic for CKD Stg III. Last OV 2018. Renal indices stable. COPD managed with Advair daily and Duonebs/ProAir inhaler as needed. She reports that she was hospitalized over the past year with pneumonia. She was given Spiriva in addition to her current medications. She does not take the Spiriva every day, only "when I feel I need it." Occasional cough with clear sputum. This is pt's norm. Reports taking Mucinex as needed and using spirometer as previously instructed. Hx of DARRON. Does not use CPAP d/t inability to tolerate. Sleeps with O2 qHS and reports awakening feeling "good and energized." Pt did express that she wanted to mention some forgetfulness lately. Admits occasionally beginning a task and forgetting to complete task. Has pulled out medication before but forgot to take it. Overall, she states, "I'm not concerned about it. I understand that I'm getting older. I just thought " "I would mention it so that we can monitor it." She is also concerned about a dry, erythemic patch to left side of nasal bridge that has been present for many years. She is concerned because she reports heavy sun exposure in younger years with inadequate sun protection. She would like region "checked out." Overall, pt reports that she feels "good." No longer a tobacco user. Uses tobacco creams/ointments PRN for psoriasis flares. States flares are rare. Denies fever, chills, HA, CP, SOB, Abd pain, Dysuria, B/B dysfunction, or any other concerns today.    5/8/19: 68 y.o.  female with PMHx significant for HTN, HLD, CKD Stg III, Solitary Kidney (kidney donor), COPD and DARRON, presenting for routine f/u. Bp at goal today. Currently taking Losartan 25 mg po daily. Renal indices stable. HgA1c 6.0%. . Pt recently had top teeth removed and dentures placed. States that she ate 1 cup of ice cream for an entire week. Pt believes this to be cause of elevated LDL. Plans to resume previous low-fat diet. Taking Atorvastatin 40 mg po daily. Tolerating well. Following Renal Clinic for CKD Stg III. Renal indices have been stable. Seen by Renal this am. Scheduled to f/u in 6 months. Hx of gallstones. Denies any recent abd complaints. COPD managed with Advair daily and Duonebs/ProAir inhaler as needed. Using Spiriva infrequently. Requesting med refills. Previously prescribed Fioricet for migraines. Reports significant improvement in migraines. Very pleased with medication effectiveness. Overall, pt reports that she's been feeling "great." Denies fever, chills, HA, CP, SOB, productive cough, Abd pain, Dysuria, B/B dysfunction, or any other concerns today.    (11/8/19): Ms. Joiner is presenting for a 6-mth f/u. PMHx significant for HTN, HLD, CKD Stg III, Solitary Kidney (kidney donor), COPD and DARRON. She was seen in Renal Clinic 10/9/19. Renal indices stable. She will return to Renal clinic in 6 mths. Alk remains slightly " elevated. Hx of gallstones without any acute complaints. Bp at goal. HgA1c 5.9%. FLP WNL; LDL 58. COPD managed with Advair twice a day, Spiriva as needed, and DuoNebs/Albuterol inhaler PRN. Mammo 10/25/19 negative. Bone Density 6/2018 WNL. Denies fever, chills, HA, CP, SOB, productive cough at present, Abd pain, Dysuria, B/B dysfunction, or any other concerns today.    ED F/u 1/6/20: Ms. Joiner is presenting for an ED f/u. PMHx significant for HTN, HLD, CKD Stg III, Solitary Kidney (kidney donor), COPD and DARRON. She presented to ED 12/30/19 with c/o cough, chills, and generalized body aches x 3 days. She thought she had the flu. Flu swab was negative. She was diagnosed with PNA. CXR revealed: Small, patchy opacity in the left lower lung may relate to atelectasis or pneumonia. She was given prescriptions for codeine-guaifenesin 10mg-100mg/5mL 5mL q6h prn cough and Levaquin 750 mg po daily x 5 days. She's completed the abx. Still using the cough syrup stating that cough and SOB with exertion is about the same. She's also c/o nasal congestion, HAs, and sinus pressure. O2 sat is 94% on RA. This is baseline for pt. She is afebrile at present. No other problems stated.    (7/24/2020): 69 y.o.  female with a PMHx significant for HTN, HLD, CKD Stg III, Solitary Kidney (kidney donor), psoriasis, OA, PNA, COPD and DARRON, presenting for routine f/u. Bp at goal today. Currently taking Losartan 25 mg po daily. Denies ADR. . Currently taking Atorvastatin 40 mg po daily. Tolerating well. Following Renal Clinic for CKD Stg III. Last OV 7/16/20. Renal indices stable. COPD managed with Advair daily and Duonebs/ProAir inhaler as needed. Uses Spiriva as needed. She endorses occasional dyspnea and wheezing with moderate exertion that resolves with rest. She is using O2 via NC at night and occasionally during a nap when she feels fatigued. Today, pt is c/o left hip pain x 2-3 weeks, occurring mainly at night when she lies  "flat. No known injuries. Pain throbbing and aching in quality. Moderate to severe intensity. Provoked by lying flat. Minimal relief with changing positions. Pain interferes with restful sleep. Unable to take NSAIDs. Has been "taking a lot of Tylenol lately" with no relief. Has used Tramadol in the past with some relief. States she was given Tramadol during an ED visit for pain in the past since she can't take "anything else because of my kidneys." She's otherwise doing well. Has started a Weight Watcher's program x 1 week. Has lost 3 lbs. No other problems stated.    Health Maintenance:   Colon Ca Screening-FIT 12/1/19, negative   Breast Ca Screening-Mammo 10/25/19, negative   Lung Ca Screening-declines    (1/25/2021): 70 y.o.  female with a PMHx significant for HTN, HLD, CKD Stg III, Solitary Kidney (kidney donor), psoriasis, OA, PNA, COPD and DARRON, presenting for routine f/u. Bp at goal today. Currently taking Losartan 25 mg po daily. Renal indices stable. She f/u with Renal via telemedicine last week. COPD managed with Advair daily and Duonebs/ProAir inhaler as needed. Uses Spiriva as needed. She endorses occasional dyspnea and wheezing with moderate exertion that resolves with rest. She is using O2 via NC at night and occasionally during a nap when she feels fatigued. She does report an increase in cough and mucous production over the last week that she attributes to the changing climate. She denies any abnl color to mucous, fever, chills, or worsening dyspnea. States sx improved with spirometer and CATE. On CPAP for DARRON. FLP WNL. Taking Atorvastatin and tolerating well. Previously referred to Sx clinic for gallstones. No appt today. C/o epigastric burning. Trying to avoid triggering foods. She had an eye exam at an outside clinic and was diagnosed with cataracts. States she'd noticed a change in vision, trouble seeing print close up, and cloudiness for a while. Needs a referral to ophthalmology. She is " "amenable to receiving the Shingrix vaccine. She denies any B/B complaints or falls. States feeling well. No other concerns.  Health Maintenance:   Colon Ca Screening-FIT 12/1/19, negative   Breast Ca Screening-Mammo 10/2020, negative   Lung Ca Screening-declines   Bone Density 10/2020, normal BMD    (7/26/2021): 70 y.o.  female with a PMHx significant for HTN, HLD, CKD Stg III, Solitary Kidney (kidney donor), psoriasis, OA, PNA, COPD and DARRON, presenting for routine f/u. Bp at goal today. Currently taking Losartan 25 mg po daily. Renal indices stable. No microalb on urine test. TSH WNL. She continues to follow renal clinic. COPD managed with Advair daily and Duonebs/ProAir inhaler as needed. Uses Spiriva as needed. She endorses occasional dyspnea and wheezing with moderate exertion that resolves with rest. She is using O2 via NC at night and occasionally during a nap when she feels fatigued. No changes from baseline. She would like a refill on benzonatate for PRN use cough. She's been evaluated in Sx clinic of gallstones. Due to higher surgical candidate risk given comorbidities, it was decided that pt would opt out of any unnecessary surgical procedures. Admits epigastric discomfort improves with omeprazole PRN. Screening mammo due 10/2021. Pt scheduled for cataract sx next month. She is requesting medication refills. No acute concerns.    (1/27/2022): 71 y.o.  female with a PMHx significant for HTN, HLD, CKD Stg III, Solitary Kidney (kidney donor), psoriasis, OA, PNA, COPD and DARRON, presenting for routine f/u. Bp at goal today. Currently taking Losartan 25 mg po daily. Renal indices stable. No microalb on urine test. She did see Renal Clinic 1/24/22. Noted stable with RTC 6 mths. Other labs reviewed and stable compared to baseline. Patient presented to ED on 1/7/22 with c/o left knee pain. Patient states her dogs hit the back of her left leg about three weeks ago and it "popped." Endorses pain to " "lateral aspect of knee and swelling. Has tried Tylenol for pain with no relief. Certain positions makes pain worse, movement makes pain better by decreasing "soreness and stiffness." In ER, XR of knee negative; prescribed prednisone and Voltaren. Appreciates a referral to Ortho. Denies falls. Reports "my lungs are doing well." She uses her spirometer regularly. Maintained with Advair and Devin PRN. No other concerns at this time.     Mammogram 11/2021 negative.    7/25/22: Patient presenting for routine f/u. VSS. Pt went to lab last week but only an A1c was drawn despite having labs orders in for her Renal appt today. A1c was WNL at 5.5. She reports that she's been feeling well. No new or worsening baseline dyspnea or chest pain. [facility] has been Ortho for OA to knees. S/p CSI which helped. Admits working on weight loss to improve knee stability and pain. Requesting refills on Advair and Benzonatate ([facility] uses PRN cough). No recent COPD exacerbations. Due for FIT this month, DXA in October, and mammogram in November. She is amenable to all screenings. No other concerns.      Review of Systems     Review of Systems   All other systems reviewed and are negative.      Medical / Social / Family History     Past Medical History:   Diagnosis Date    Anxiety disorder, unspecified     CKD (chronic kidney disease)     COPD (chronic obstructive pulmonary disease)     Dyslipidemia     History of radical nephrectomy     HTN (hypertension)     DARRON (obstructive sleep apnea)        Past Surgical History:   Procedure Laterality Date    bilateral oophrectomy      CATARACT EXTRACTION W/ INTRAOCULAR LENS IMPLANT Right 08/05/2021    CATARACT EXTRACTION W/ INTRAOCULAR LENS IMPLANT Left 07/13/2021    HYSTERECTOMY         Social History  Ms.  reports that she quit smoking about 5 years ago. Her smoking use included cigarettes. She has never used smokeless tobacco. She reports previous alcohol use. She reports previous drug " use.    Family History  Ms.'s family history includes Cancer in her mother; Heart attack in her father; Heart failure in her father.    Medications and Allergies     Medications  Medication List with Changes/Refills   Current Medications    ALBUTEROL (PROVENTIL/VENTOLIN HFA) 90 MCG/ACTUATION INHALER    Inhale 1 puff into the lungs every 6 (six) hours as needed.    ALBUTEROL-IPRATROPIUM (DUO-NEB) 2.5 MG-0.5 MG/3 ML NEBULIZER SOLUTION    Inhale 3 mLs into the lungs 4 (four) times daily as needed.    ASPIRIN (ECOTRIN) 81 MG EC TABLET    Take 81 mg by mouth.    ATORVASTATIN (LIPITOR) 40 MG TABLET    Take 40 mg by mouth once daily.    CALCIUM CARBONATE (OS-GINGER) 500 MG CALCIUM (1,250 MG) TABLET    Take 1 tablet by mouth 2 (two) times daily.    CETIRIZINE (ZYRTEC) 10 MG TABLET    Take 10 mg by mouth.    DICLOFENAC SODIUM (VOLTAREN) 1 % GEL    Apply topically 4 (four) times daily as needed.    LOSARTAN (COZAAR) 25 MG TABLET    Take 25 mg by mouth once daily.    OMEGA-3 FATTY ACIDS 500 MG CAP    Take 1 capsule by mouth.   Changed and/or Refilled Medications    Modified Medication Previous Medication    ADVAIR DISKUS 100-50 MCG/DOSE DISKUS INHALER ADVAIR DISKUS 100-50 mcg/dose diskus inhaler       Inhale 1 puff into the lungs 2 (two) times daily.    Inhale 1 puff into the lungs 2 (two) times daily.    BENZONATATE (TESSALON) 100 MG CAPSULE benzonatate (TESSALON) 100 MG capsule       Take 1 capsule (100 mg total) by mouth 3 (three) times daily as needed for Cough.    Take 100 mg by mouth 3 (three) times daily.    OMEPRAZOLE (PRILOSEC) 20 MG CAPSULE omeprazole (PRILOSEC) 20 MG capsule       Take 1 capsule (20 mg total) by mouth once daily.    Take 20 mg by mouth once daily.   Discontinued Medications    PREDNISONE (DELTASONE) 20 MG TABLET    Take 20 mg by mouth once daily.       Allergies  Review of patient's allergies indicates:  No Known Allergies    Physical Examination     Vitals:    07/25/22 0858   BP: 133/79   Pulse: 83    Resp: 20   Temp: 98 °F (36.7 °C)     Physical Exam  Constitutional:       Appearance: Normal appearance. She is obese.   HENT:      Head: Normocephalic and atraumatic.      Mouth/Throat:      Mouth: Mucous membranes are moist.   Eyes:      Extraocular Movements: Extraocular movements intact.      Conjunctiva/sclera: Conjunctivae normal.      Pupils: Pupils are equal, round, and reactive to light.   Cardiovascular:      Rate and Rhythm: Normal rate and regular rhythm.      Pulses: Normal pulses.      Heart sounds: Normal heart sounds.   Pulmonary:      Effort: Pulmonary effort is normal.      Breath sounds: Normal breath sounds.   Abdominal:      General: Bowel sounds are normal.      Palpations: Abdomen is soft.   Musculoskeletal:         General: Normal range of motion.      Cervical back: Normal range of motion.   Skin:     General: Skin is warm and dry.   Neurological:      General: No focal deficit present.      Mental Status: She is alert and oriented to person, place, and time.   Psychiatric:         Mood and Affect: Mood normal.         Behavior: Behavior normal.         Thought Content: Thought content normal.         Judgment: Judgment normal.           Results     Lab Results   Component Value Date    WBC 8.6 01/11/2019    RBC 4.71 01/11/2019    HGB 15.1 01/11/2019    HCT 47.0 (H) 01/11/2019    MCV 99.8 01/11/2019    MCH 32.1 01/11/2019    MCHC 32.1 01/11/2019    RDW 12.1 01/11/2019     01/11/2019    MPV 10.6 (H) 01/11/2019     CMP  Sodium Level   Date Value Ref Range Status   01/11/2019 141 136 - 145 mmol/L Final     Potassium Level   Date Value Ref Range Status   01/11/2019 4.4 3.5 - 5.1 mmol/L Final     Carbon Dioxide   Date Value Ref Range Status   01/11/2019 30 21 - 32 mmol/L Final     Blood Urea Nitrogen   Date Value Ref Range Status   01/11/2019 22 (H) 7 - 18 mg/dL Final     Creatinine   Date Value Ref Range Status   01/11/2019 1.30 0.60 - 1.30 mg/dL Final     Calcium Level Total   Date  Value Ref Range Status   01/11/2019 8.8 8.5 - 10.1 mg/dL Final     Albumin Level   Date Value Ref Range Status   01/11/2019 3.8 3.4 - 5.0 gm/dL Final     Bilirubin Total   Date Value Ref Range Status   01/11/2019 0.5 0.2 - 1.0 mg/dL Final     Alkaline Phosphatase   Date Value Ref Range Status   01/11/2019 119 (H) 45 - 117 unit/L Final     Aspartate Aminotransferase   Date Value Ref Range Status   01/11/2019 22 15 - 37 unit/L Final     Alanine Aminotransferase   Date Value Ref Range Status   01/11/2019 25 12 - 78 unit/L Final     Estimated GFR-Non    Date Value Ref Range Status   01/11/2019 44 (L) >>=90 mL/min Final     Lab Results   Component Value Date    CHOL 164 12/10/2018     Lab Results   Component Value Date    HDL 52 12/10/2018     No results found for: LDLCALC  Lab Results   Component Value Date    TRIG 147 12/10/2018     No results found for: CHOLHDL  Lab Results   Component Value Date    TSH 2.930 05/01/2018     Lab Results   Component Value Date    PHUR 5.5 11/08/2018    PROTEINUA 10 (A) 11/08/2018    GLUCUA Normal 11/08/2018    KETONESU Negative 11/08/2018    OCCULTUA Negative 11/08/2018    NITRITE Negative 11/08/2018    LEUKOCYTESUR Negative 11/08/2018           Assessment and Plan (including Health Maintenance)     Plan:         Health Maintenance Due   Topic Date Due    TETANUS VACCINE  Never done    Colorectal Cancer Screening  Never done    COVID-19 Vaccine (3 - Booster for Pfizer series) 08/12/2021    Pneumococcal Vaccines (Age 65+) (3 - PPSV23 or PCV20) 05/30/2022       Problem List Items Addressed This Visit        Pulmonary    Asthma    Current Assessment & Plan     Use your rescue inhaler and nebulizer for acute asthma symptoms when they occur. These are your rescue medications and help to relax tight muscles around your airways. If you are having to use these medications more than 2x per week, please notify the clinic as this could indicate poor asthma control.  Avoid  asthma triggers (i.e., pet dander, molds, dust mites, cockroaches, pollen, cigarette smoke, respiratory illnesses, etc)  Stay up-to-date with immunizations, especially the influenza and pneumonia vaccinations             Chronic obstructive pulmonary disease - Primary    Current Assessment & Plan     COPD Management:  Maintenance - Advair twice a day, Spiriva as needed, Home O2 PRN  Rescue- DuoNebs/Albuterol inhaler PRN   COPD Exacerbation Prevention  Immunizations-  Influenza Vaccine yearly  Pneumococcal Vaccine  Tobacco Cessation if applicable  Avoid allergens             Relevant Medications    benzonatate (TESSALON) 100 MG capsule    ADVAIR DISKUS 100-50 mcg/dose diskus inhaler       Cardiac/Vascular    Hypertension    Current Assessment & Plan     At goal  Continue current regimen  DASH              Renal/    Stage 3 chronic kidney disease    Current Assessment & Plan     Following Renal. Scheduled in Renal today, but labs were not drawn by lab despite pt coming to lab last week. She will contact Renal after this appt to reschedule              Endocrine    Impaired glucose tolerance    Current Assessment & Plan     HgA1c: 5.5%  Prediabetes: 5.7%-6.4%  Diabetes: 6.5% or greater  Recommendations:  Educated on a diabetic diet- Low-fat, Low-carb, Low-cholesterol. Instructed to avoid excessive intake of sugary/dark drinks/sodas and white foods (i.e., bread, pasta, potatoes, rice).  Encouraged aerobic exercise: 20-30 min/day x 5 days/week.             Obesity    Current Assessment & Plan     Educated on aerobic exercise for 30 mins/day, at least 5 days per week. Low-fat diet                GI    Gastroesophageal reflux disease without esophagitis    Current Assessment & Plan     1. Drink 6-8 glasses of water a day  2. Avoid triggers (i.e., spicy foods, caffeine, chocolate, fatty foods)  3. No smoking  4. Elevate head of bed at night if needed  5. Avoid eating large meals 2-3 hours before bedtime  6. Antacids as  needed                Orthopedic    Osteoarthritis of left knee    Current Assessment & Plan     Pt had been following Ortho. S/p CSI. Doing better              Other    Wellness examination    Overview     Health Maintenance:   Colon Ca Screening-FIT 7/28/21, negative   Breast Ca Screening-Mammo 11/4/21, negative   Lung Ca Screening-declines   Bone Density 10/2020, normal BMD               Other Visit Diagnoses     Encounter for screening mammogram for malignant neoplasm of breast        Relevant Orders    Mammo Digital Screening Bilat    Screening for colon cancer        Relevant Orders    OCCULT BLOOD FECAL IMMUNOASSAY    Asymptomatic postmenopausal state        Relevant Orders    DXA Bone Density Spine And Hip          Health Maintenance Topics with due status: Not Due       Topic Last Completion Date    Lipid Panel 12/10/2018    DEXA Scan 10/30/2020    Mammogram 11/04/2021    Influenza Vaccine 11/05/2021       Future Appointments   Date Time Provider Department Center   7/25/2022 10:00 AM BIRGIT Licea [facility]   9/8/2022  9:00 AM [facility] Altha Ey            Signature:  BIRGIT Stafford  [facility]  [address] [id number]    Date of encounter: 7/25/22

## 2022-07-22 NOTE — ASSESSMENT & PLAN NOTE
COPD Management:  Maintenance - Advair twice a day, Spiriva as needed, Home O2 PRN  Rescue- DuoNebs/Albuterol inhaler PRN   COPD Exacerbation Prevention  Immunizations-  Influenza Vaccine yearly  Pneumococcal Vaccine  Tobacco Cessation if applicable  Avoid allergens

## 2022-07-24 RX ORDER — PRAVASTATIN SODIUM 20 MG/1
20 TABLET ORAL
COMMUNITY
End: 2022-07-24

## 2022-07-24 RX ORDER — ASPIRIN 81 MG/1
81 TABLET ORAL
COMMUNITY
End: 2022-07-25

## 2022-07-24 RX ORDER — CETIRIZINE HYDROCHLORIDE 10 MG/1
10 TABLET ORAL DAILY
COMMUNITY

## 2022-07-24 RX ORDER — CHOLECALCIFEROL (VITAMIN D3) 25 MCG
185 TABLET ORAL
COMMUNITY
End: 2022-07-24

## 2022-07-24 RX ORDER — CALCIUM CARBONATE 500(1250)
1 TABLET ORAL 2 TIMES DAILY
COMMUNITY

## 2022-07-24 NOTE — PROGRESS NOTES
"[facility]  Nephrology Clinic Note   Chief Complaint   Patient presents with    Chronic Kidney Disease     RTC, took meds, , dyspnia      History of Present Illness  Mrs Marisel Resendez is a 71 y.o. White female with past medical history of chronic kidney disease stage III, solitary kidney (she is a kidney donor, status post radical nephrectomy in 2003), hypertension, dyslipidemia, anxiety, COPD, and DARRON. She presents for follow-up appointment in nephrology clinic, denies complaints.    Review of Systems  Twelve point review of systems conducted, negative except as stated in history of present illness.    Review of patient's allergies indicates:  No Known Allergies    Past Medical History:   Past Medical History:   Diagnosis Date    Anxiety disorder, unspecified     CKD (chronic kidney disease)     COPD (chronic obstructive pulmonary disease)     Dyslipidemia     History of radical nephrectomy     HTN (hypertension)     DARRON (obstructive sleep apnea)        Procedure History:   Past Surgical History:   Procedure Laterality Date    bilateral oophrectomy      CATARACT EXTRACTION W/ INTRAOCULAR LENS IMPLANT Right 08/05/2021    CATARACT EXTRACTION W/ INTRAOCULAR LENS IMPLANT Left 07/13/2021    HYSTERECTOMY         Family History: family history includes Cancer in her mother; Heart attack in her father; Heart failure in her father.    Social History:  reports that she quit smoking about 5 years ago. Her smoking use included cigarettes. She has never used smokeless tobacco. She reports previous alcohol use. She reports previous drug use.    Physical Exam:   /80 (BP Location: Left arm, Patient Position: Sitting, BP Method: Large (Automatic))   Pulse 78   Temp 98.3 °F (36.8 °C) (Oral)   Resp 18   Ht 5' 2.6" (1.59 m)   Wt 78.8 kg (173 lb 12.8 oz)   SpO2 96%   BMI 31.18 kg/m²     General appearance: Patient is in no acute distress.  Skin: No rashes or wounds.  HEENT: " PERRLA, EOMI, no scleral icterus, no JVD. Neck is supple.  Chest: Respirations are unlabored. Lungs sounds are clear.   Heart: S1, S2.   Abdomen: Benign.  : Deferred.  Extremities: No edema, peripheral pulses are palpable.   Neuro: No focal deficits.     Home Medications:    Current Outpatient Medications:     ADVAIR DISKUS 100-50 mcg/dose diskus inhaler, Inhale 1 puff into the lungs 2 (two) times daily., Disp: 60 each, Rfl: 6    albuterol (PROVENTIL/VENTOLIN HFA) 90 mcg/actuation inhaler, Inhale 1 puff into the lungs every 6 (six) hours as needed., Disp: , Rfl:     albuterol-ipratropium (DUO-NEB) 2.5 mg-0.5 mg/3 mL nebulizer solution, Inhale 3 mLs into the lungs 4 (four) times daily as needed., Disp: , Rfl:     atorvastatin (LIPITOR) 40 MG tablet, Take 40 mg by mouth once daily., Disp: , Rfl:     benzonatate (TESSALON) 100 MG capsule, Take 1 capsule (100 mg total) by mouth 3 (three) times daily as needed for Cough., Disp: 30 capsule, Rfl: 0    calcium carbonate (OS-GINGER) 500 mg calcium (1,250 mg) tablet, Take 1 tablet by mouth 2 (two) times daily., Disp: , Rfl:     cetirizine (ZYRTEC) 10 MG tablet, Take 10 mg by mouth., Disp: , Rfl:     diclofenac sodium (VOLTAREN) 1 % Gel, Apply topically 4 (four) times daily as needed., Disp: , Rfl:     losartan (COZAAR) 25 MG tablet, Take 25 mg by mouth once daily., Disp: , Rfl:     omeprazole (PRILOSEC) 20 MG capsule, Take 1 capsule (20 mg total) by mouth once daily., Disp: 90 capsule, Rfl: 1    aspirin (ECOTRIN) 81 MG EC tablet, Take 81 mg by mouth., Disp: , Rfl:     omega-3 fatty acids 500 mg Cap, Take 1 capsule by mouth., Disp: , Rfl:      Laboratory Data:   Reviewed    Imaging:  (06/10/2015 08:58 CDT US Retroperitoneum Limited)  Clinical indication: Patient donated right kidney, solitary kidney.    Imaging of the retroperitoneal areas were obtained, with the right  kidney noted be surgically absent with no abnormality seen in the  right renal fossa. The left  kidney is normal in appearance measuring  11.5 x 6.0 x 5.2 cm. There is no hydronephrosis, calculi, or evidence  of a solid mass. No perirenal fluid collections are demonstrated.  Renal cortex demonstrates normal echogenicity. The bladder was not  imaged.    Impression: Status post right nephrectomy with normal appearance the  left kidney..       Impression and Plan     CKD stage G3b/A1, GFR 30-44 and albumin creatinine ratio <30 mg/g  -     Comprehensive Metabolic Panel; Future; Expected date: 01/25/2023  -     Protein/Creatinine Ratio, Urine; Future; Expected date: 01/25/2023  -     Urinalysis; Future; Expected date: 01/25/2023  Solitary kidney.  Renal function has been is stable, there is no significant proteinuria.  Continue risk factor management and periodic monitoring.  Continue:  -follow 2 g a day dietary sodium restriction  -control high blood pressure (goal blood pressure is less than 130/80, please check blood pressure twice a week and bring blood pressure logs to office visit)  -exercise at least 30 minutes a day, 5 days a week  -maintain healthy weight  -stay well hydrated (drink water only, avoid juices, sweet tea, and sodas)  -ask about staying up-to-date on vaccinations (flu vaccine, pneumonia vaccine, hepatitis B vaccine)  -avoid excessive use of NSAIDs (ibuprofen, naproxen, Aleve, Advil, Toradol, Mobic), take Tylenol as needed for headache or mild pain  -take cholesterol lowering medications if prescribed (LDL goal less than 100)    Follow-up with the primary care provider as scheduled.  Return to subspecialty nephrology (kidney) clinic with routine labs in 6 months      Primary hypertension  Blood pressure is at goal, continue current medication regimen and 2 g a day dietary sodium restriction.    BMI 36.0-36.9,adult  Lifestyle and dietary interventions discussed, patient counseled on weight loss using portion control, non sedentary lifestyle, low-carbohydrate/low fat diet.

## 2022-07-25 ENCOUNTER — OFFICE VISIT (OUTPATIENT)
Dept: INTERNAL MEDICINE | Facility: CLINIC | Age: 72
End: 2022-07-25

## 2022-07-25 ENCOUNTER — PATIENT MESSAGE (OUTPATIENT)
Dept: NEPHROLOGY | Facility: CLINIC | Age: 72
End: 2022-07-25

## 2022-07-25 ENCOUNTER — OFFICE VISIT (OUTPATIENT)
Dept: NEPHROLOGY | Facility: CLINIC | Age: 72
End: 2022-07-25

## 2022-07-25 VITALS
OXYGEN SATURATION: 96 % | TEMPERATURE: 98 F | WEIGHT: 173.81 LBS | HEIGHT: 63 IN | RESPIRATION RATE: 18 BRPM | SYSTOLIC BLOOD PRESSURE: 117 MMHG | DIASTOLIC BLOOD PRESSURE: 80 MMHG | BODY MASS INDEX: 30.8 KG/M2 | HEART RATE: 78 BPM

## 2022-07-25 VITALS
HEART RATE: 83 BPM | WEIGHT: 211.38 LBS | BODY MASS INDEX: 37.45 KG/M2 | RESPIRATION RATE: 20 BRPM | HEIGHT: 63 IN | DIASTOLIC BLOOD PRESSURE: 79 MMHG | TEMPERATURE: 98 F | SYSTOLIC BLOOD PRESSURE: 133 MMHG

## 2022-07-25 DIAGNOSIS — Z12.31 ENCOUNTER FOR SCREENING MAMMOGRAM FOR MALIGNANT NEOPLASM OF BREAST: ICD-10-CM

## 2022-07-25 DIAGNOSIS — E66.9 OBESITY, UNSPECIFIED CLASSIFICATION, UNSPECIFIED OBESITY TYPE, UNSPECIFIED WHETHER SERIOUS COMORBIDITY PRESENT: ICD-10-CM

## 2022-07-25 DIAGNOSIS — Z12.11 SCREENING FOR COLON CANCER: ICD-10-CM

## 2022-07-25 DIAGNOSIS — N18.32 CKD STAGE G3B/A1, GFR 30-44 AND ALBUMIN CREATININE RATIO <30 MG/G: Primary | ICD-10-CM

## 2022-07-25 DIAGNOSIS — R73.02 IMPAIRED GLUCOSE TOLERANCE: ICD-10-CM

## 2022-07-25 DIAGNOSIS — I10 PRIMARY HYPERTENSION: ICD-10-CM

## 2022-07-25 DIAGNOSIS — J44.9 CHRONIC OBSTRUCTIVE PULMONARY DISEASE, UNSPECIFIED COPD TYPE: Primary | ICD-10-CM

## 2022-07-25 DIAGNOSIS — Z00.00 WELLNESS EXAMINATION: ICD-10-CM

## 2022-07-25 DIAGNOSIS — K21.9 GASTROESOPHAGEAL REFLUX DISEASE WITHOUT ESOPHAGITIS: ICD-10-CM

## 2022-07-25 DIAGNOSIS — N18.32 STAGE 3B CHRONIC KIDNEY DISEASE: ICD-10-CM

## 2022-07-25 DIAGNOSIS — J45.909 ASTHMA, UNSPECIFIED ASTHMA SEVERITY, UNSPECIFIED WHETHER COMPLICATED, UNSPECIFIED WHETHER PERSISTENT: ICD-10-CM

## 2022-07-25 DIAGNOSIS — M17.12 OSTEOARTHRITIS OF LEFT KNEE, UNSPECIFIED OSTEOARTHRITIS TYPE: ICD-10-CM

## 2022-07-25 DIAGNOSIS — Z78.0 ASYMPTOMATIC POSTMENOPAUSAL STATE: ICD-10-CM

## 2022-07-25 LAB
ALBUMIN SERPL-MCNC: 4.1 GM/DL (ref 3.4–4.8)
ALBUMIN/GLOB SERPL: 1.4 RATIO (ref 1.1–2)
ALP SERPL-CCNC: 106 UNIT/L (ref 40–150)
ALT SERPL-CCNC: 19 UNIT/L (ref 0–55)
AST SERPL-CCNC: 24 UNIT/L (ref 5–34)
BILIRUBIN DIRECT+TOT PNL SERPL-MCNC: 0.6 MG/DL
BUN SERPL-MCNC: 26.4 MG/DL (ref 9.8–20.1)
CALCIUM SERPL-MCNC: 9.6 MG/DL (ref 8.4–10.2)
CHLORIDE SERPL-SCNC: 105 MMOL/L (ref 98–107)
CO2 SERPL-SCNC: 28 MMOL/L (ref 23–31)
CREAT SERPL-MCNC: 1.5 MG/DL (ref 0.55–1.02)
GLOBULIN SER-MCNC: 3 GM/DL (ref 2.4–3.5)
GLUCOSE SERPL-MCNC: 99 MG/DL (ref 82–115)
POTASSIUM SERPL-SCNC: 4.9 MMOL/L (ref 3.5–5.1)
PROT SERPL-MCNC: 7.1 GM/DL (ref 5.8–7.6)
SODIUM SERPL-SCNC: 139 MMOL/L (ref 136–145)

## 2022-07-25 PROCEDURE — 99214 PR OFFICE/OUTPT VISIT, EST, LEVL IV, 30-39 MIN: ICD-10-PCS | Mod: S$PBB,,, | Performed by: NURSE PRACTITIONER

## 2022-07-25 PROCEDURE — 36415 COLL VENOUS BLD VENIPUNCTURE: CPT | Performed by: NURSE PRACTITIONER

## 2022-07-25 PROCEDURE — 99215 OFFICE O/P EST HI 40 MIN: CPT | Mod: PBBFAC,27 | Performed by: NURSE PRACTITIONER

## 2022-07-25 PROCEDURE — 99214 OFFICE O/P EST MOD 30 MIN: CPT | Mod: S$PBB,,, | Performed by: NURSE PRACTITIONER

## 2022-07-25 PROCEDURE — 80053 COMPREHEN METABOLIC PANEL: CPT | Performed by: NURSE PRACTITIONER

## 2022-07-25 PROCEDURE — 99215 OFFICE O/P EST HI 40 MIN: CPT | Mod: PBBFAC | Performed by: NURSE PRACTITIONER

## 2022-07-25 RX ORDER — BENZONATATE 100 MG/1
100 CAPSULE ORAL 3 TIMES DAILY PRN
Qty: 30 CAPSULE | Refills: 0 | Status: SHIPPED | OUTPATIENT
Start: 2022-07-25 | End: 2023-04-25 | Stop reason: SDUPTHER

## 2022-07-25 RX ORDER — CEPHALEXIN 250 MG/1
1 CAPSULE ORAL 2 TIMES DAILY
Qty: 60 EACH | Refills: 6 | Status: SHIPPED | OUTPATIENT
Start: 2022-07-25 | End: 2023-04-25

## 2022-07-25 RX ORDER — OMEPRAZOLE 20 MG/1
20 CAPSULE, DELAYED RELEASE ORAL DAILY
Qty: 90 CAPSULE | Refills: 1 | Status: SHIPPED | OUTPATIENT
Start: 2022-07-25 | End: 2023-04-25 | Stop reason: SDUPTHER

## 2022-07-25 NOTE — ASSESSMENT & PLAN NOTE
Use your rescue inhaler and nebulizer for acute asthma symptoms when they occur. These are your rescue medications and help to relax tight muscles around your airways. If you are having to use these medications more than 2x per week, please notify the clinic as this could indicate poor asthma control.  Avoid asthma triggers (i.e., pet dander, molds, dust mites, cockroaches, pollen, cigarette smoke, respiratory illnesses, etc)  Stay up-to-date with immunizations, especially the influenza and pneumonia vaccinations

## 2022-07-25 NOTE — ASSESSMENT & PLAN NOTE
HgA1c: 5.5%  Prediabetes: 5.7%-6.4%  Diabetes: 6.5% or greater  Recommendations:  Educated on a diabetic diet- Low-fat, Low-carb, Low-cholesterol. Instructed to avoid excessive intake of sugary/dark drinks/sodas and white foods (i.e., bread, pasta, potatoes, rice).  Encouraged aerobic exercise: 20-30 min/day x 5 days/week.

## 2022-09-02 ENCOUNTER — OFFICE VISIT (OUTPATIENT)
Dept: ORTHOPEDICS | Facility: CLINIC | Age: 72
End: 2022-09-02

## 2022-09-02 VITALS
WEIGHT: 211 LBS | HEART RATE: 91 BPM | BODY MASS INDEX: 38.83 KG/M2 | DIASTOLIC BLOOD PRESSURE: 83 MMHG | HEIGHT: 62 IN | RESPIRATION RATE: 16 BRPM | SYSTOLIC BLOOD PRESSURE: 128 MMHG

## 2022-09-02 DIAGNOSIS — M17.12 OSTEOARTHRITIS OF LEFT KNEE, UNSPECIFIED OSTEOARTHRITIS TYPE: Primary | ICD-10-CM

## 2022-09-02 DIAGNOSIS — E66.9 CLASS 2 OBESITY WITH BODY MASS INDEX (BMI) OF 38.0 TO 38.9 IN ADULT, UNSPECIFIED OBESITY TYPE, UNSPECIFIED WHETHER SERIOUS COMORBIDITY PRESENT: ICD-10-CM

## 2022-09-02 PROCEDURE — 99214 OFFICE O/P EST MOD 30 MIN: CPT | Mod: PBBFAC,25

## 2022-09-02 PROCEDURE — 20610 DRAIN/INJ JOINT/BURSA W/O US: CPT | Mod: PBBFAC | Performed by: STUDENT IN AN ORGANIZED HEALTH CARE EDUCATION/TRAINING PROGRAM

## 2022-09-02 RX ORDER — LIDOCAINE HYDROCHLORIDE 10 MG/ML
5 INJECTION, SOLUTION EPIDURAL; INFILTRATION; INTRACAUDAL; PERINEURAL
Status: COMPLETED | OUTPATIENT
Start: 2022-09-02 | End: 2022-09-02

## 2022-09-02 RX ORDER — TRIAMCINOLONE ACETONIDE 40 MG/ML
40 INJECTION, SUSPENSION INTRA-ARTICULAR; INTRAMUSCULAR ONCE
Status: COMPLETED | OUTPATIENT
Start: 2022-09-02 | End: 2022-09-02

## 2022-09-02 RX ADMIN — LIDOCAINE HYDROCHLORIDE 50 MG: 10 INJECTION, SOLUTION EPIDURAL; INFILTRATION; INTRACAUDAL; PERINEURAL at 10:09

## 2022-09-02 RX ADMIN — TRIAMCINOLONE ACETONIDE 40 MG: 40 INJECTION, SUSPENSION INTRA-ARTICULAR; INTRAMUSCULAR at 10:09

## 2022-09-02 NOTE — PROGRESS NOTES
"Subjective:    Patient ID: Marisel Resendez is a 71 y.o. female  who presented to [facility] for follow up..    Chief Complaint: Pain of the Left Knee and Follow-up    History of Present Illness:    Marisel Resendez is a 71 y.o female who presents today with complaints of left knee pain. She was seen in this office on 3/22 for this pain. She was diagnosed with osteoarthritis and received CSI to the left knee. She had good relief of her pain and has been doing well until approximately 2 weeks ago when her pain gradually returned. She says that her pain is mostly in the medial aspect of her left knee. She describes it as a 4/10 aching pain. The pain is made worse with prolonged inactivity. She is able to ambulate without instability or significant worsening of pain. She has been using ice, Tylenol, and Voltaren gel with mild relief. She notes that the knee gets swollen at the end of the day. She denies any trauma. Expectations for today's visit includes pain relief with CSI. PCP is Teja GENAO.    Knee Review of Systems:  Swelling?  yes  Instability?  no  Mechanical sx?  no  <30 min AM stiffness? yes  Limited ROM? no  Fever/Chills? no    Current Choice of Exercise:  Walking and Gardening/yard work     Objective:    Physical Exam:    /83   Pulse 91   Resp 16   Ht 5' 2" (1.575 m)   Wt 95.7 kg (211 lb)   BMI 38.59 kg/m²     Appearance:  antalgic  FWB  Alignment: Left: mild valgus Right: normal   Soft tissue swelling: Left: no Right: no  Effusion: Left:  Negative Right: Negative  Erythema: Left no Right: no  Ecchymosis: Left: no Right: no  Atrophy: Left: no Right: no    Palpation:  Knee Tenderness: Left: Medial joint line and Lateral joint line Right: None    Range of motion:  Flexion (140): Left:  130 Right: 130  Extension (0): Left: 0 Right: 0    Strength:  Extension: Left 5/5  Pain: no     Right 5/5 Pain: no  Flexion: Left 5/5 Pain: no Right   5/5 " Pain: no    Special Tests:  Ballotable Effusion:Left: Negative Right: Negative   Fluid Wave: Left: Negative Right: Negative   Crepitus: Left: Positive Right: Negative   Patellar grind test: Left: Negative  Right: Negative  Apprehension test: Left: Negative Right: Negative   Varus: @ 0, Left Negative Right: Negative.  @ 30, Left Negative  Right Negative   Valgus: @ 0, Left Negative Right: Negative.  @ 30, Left Negative  Right Negative  Lachman: Left: Negative Right: Negative   Ant Drawer: Left: Negative Right: Negative   Posterior Drawer: Left: Negative Right: Negative   Cori: Left: Negative Right: Negative     General appearance: NAD  Peripheral pulses: normal bilaterally   Sensation: normal    Imaging:   Previous images reviewed.  X-rays ordered and performed today: no  # of views: 3 Laterality: left  My Interpretation:   Tricompartmental joint space narrowing (medial > lateral) with osteophytes. No acute fractures or dislocations.     Assessment:      Encounter Diagnoses   Name Primary?    Osteoarthritis of left knee, unspecified osteoarthritis type Yes    Class 2 obesity with body mass index (BMI) of 38.0 to 38.9 in adult, unspecified obesity type, unspecified whether serious comorbidity present         Plan:     Orders Placed This Encounter   Procedures    Large Joint Aspiration/Injection     This order was created via procedure documentation     Medications Ordered This Encounter   Medications    LIDOcaine (PF) 10 mg/ml (1%) injection 50 mg    triamcinolone acetonide injection 40 mg       Dx: left Knee Osteoarthritis. Acute in moderate exacerbation.   Treatment Plan: Discussed with patient diagnosis, prognosis, and treatment recommendations. Education provided.    Home physical therapy exercise handouts provided to patient.   topical hot or cold therapy  Over the counter NSAID and/or tylenol provided you do not have contraindications such as but not limited to liver or kidney disease or uncontrolled blood  pressure. If you're doctors have told you to to not take them based on your health, do not take them.   oral glucosamine 1500 mg/day.  topical capsaicin as needed  Imaging: prior radiological studies independently reviewed; discussed with patient; agree with radiologist interpretation.   Weight Management: is paramount. recommend at least 10% of total body weight loss if your bmi is 30-34.9. A bmi 24.9 or less may provide further relief..   Procedure: Discussed CSI/VSI as treatment options; discussed CSI vs VS injections as treatment options; since conservative measures did not improve symptoms patient consented for CSI today.  Activity: Activity as tolerated; HEP to include aerobic conditioning and strength training with non-painful activity. ROM/STG exercises. Proper footware; assistive devises to avoid limping.   Therapy: No formal therapy  Medication: first line treatment with daily acetaminophen. Up to 1000 mg three times daily can be taken; medication precautions given.. Please see your primary care physician for further refills.  RTC: PRN; call if any issues.         Large Joint Aspiration/Injection: L knee    Date/Time: 9/2/2022 9:10 AM  Performed by: Apollo Nelson MD  Authorized by: Apollo Nelson MD     Consent Done?:  Yes (Written)  Indications:  Arthritis  Site marked: the procedure site was marked    Timeout: prior to procedure the correct patient, procedure, and site was verified    Prep: patient was prepped and draped in usual sterile fashion      Local anesthesia used?: Yes    Local anesthetic:  Topical anesthetic    Details:  Needle Size:  21 G  Ultrasonic Guidance for needle placement?: No    Location:  Knee  Site:  L knee  Patient tolerance:  Patient tolerated the procedure well with no immediate complications     Staff: Jd Myrick MD     Risks:  Possible complications with the injection include bleeding, infection (.01%), tendon rupture, steroid flare, fat pad or soft tissue atrophy, skin  depigmentation, allergic reaction to medications and vasovagal response. (steroid flare treatment is rest, ice, NSAIDs and resolves in 24-36 hours.)    Consent:  No absolute contraindications (cellulitis overlying joint, infection, lack of informed consent, allergy to injection medication, AVN protein or egg allergy for sodium hyaluronate, or history of steroid flare) or relative contraindications (uncontrolled DM2 A1c>10, coagulopathy, INR > 3.5, previous joint replacement or history of AVN).        Description:  The patient was prepped in normal sterile fashion use of chlorhexidine scrub and the appropriate and anatomic landmarks were identified with ultrasound.  Contents of syringe included: 5cc of 1% of lidocaine with 40mg of Kenalog     Post Procedure: Patient alert, and moving all extremities. ROM improved, pain decreased.  Good peripheral pulses, no signs of vascular compromise and range of motion intact.  Aftercare instructions were given to patient at time of discharge.  Relative rest for 3 days-avoiding excess activity.  Place ice on the area for 15 minutes every 4-6 hours. Patient may take Tylenol a 1000 mg b.i.d. or ibuprofen 600 mg t.i.d. for the next 3-4 days if not on medication already and safe to take pending co-morbidities.  Protect the area for the next 1-8 hours if anesthetic was used.  Avoid excessive activity for the next 3-4 weeks.  ER precautions given for fever, severe joint pain or allergic reaction or other new symptoms related to the joint injection.

## 2022-09-29 ENCOUNTER — PATIENT MESSAGE (OUTPATIENT)
Dept: INTERNAL MEDICINE | Facility: CLINIC | Age: 72
End: 2022-09-29

## 2022-10-21 ENCOUNTER — CLINICAL SUPPORT (OUTPATIENT)
Dept: INTERNAL MEDICINE | Facility: CLINIC | Age: 72
End: 2022-10-21

## 2022-10-21 VITALS — BODY MASS INDEX: 38.83 KG/M2 | WEIGHT: 211 LBS | HEIGHT: 62 IN

## 2022-10-21 DIAGNOSIS — Z23 IMMUNIZATION DUE: Primary | ICD-10-CM

## 2022-10-21 PROCEDURE — 99212 OFFICE O/P EST SF 10 MIN: CPT | Mod: PBBFAC

## 2022-10-21 PROCEDURE — 90694 VACC AIIV4 NO PRSRV 0.5ML IM: CPT | Mod: PBBFAC

## 2022-10-21 NOTE — PROGRESS NOTES
"     Patient presented to [facility] for  annual Influenza 65 + Vaccine. VIS sheet reviewed and given to patient. Influenza (FLUAD) Quadrivalent-Adjuvanted-PF-"Preferred"(65+)  0.5 ml administered IM to left deltoid using aseptic technique. Patient tolerated procedure well with no adverse reactions noted. Patient instructed to call provider if need [phone].  "

## 2022-10-24 PROBLEM — Z00.00 WELLNESS EXAMINATION: Status: RESOLVED | Noted: 2022-07-22 | Resolved: 2022-10-24

## 2022-11-07 ENCOUNTER — PATIENT MESSAGE (OUTPATIENT)
Dept: ADMINISTRATIVE | Facility: HOSPITAL | Age: 72
End: 2022-11-07

## 2022-11-07 ENCOUNTER — HOSPITAL ENCOUNTER (OUTPATIENT)
Dept: RADIOLOGY | Facility: HOSPITAL | Age: 72
Discharge: HOME | End: 2022-11-07
Attending: NURSE PRACTITIONER

## 2022-11-07 DIAGNOSIS — Z12.31 ENCOUNTER FOR SCREENING MAMMOGRAM FOR MALIGNANT NEOPLASM OF BREAST: ICD-10-CM

## 2022-11-07 DIAGNOSIS — Z78.0 ASYMPTOMATIC POSTMENOPAUSAL STATE: ICD-10-CM

## 2022-11-07 PROCEDURE — 77063 MAMMO DIGITAL SCREENING BILAT WITH TOMO: ICD-10-PCS | Mod: 26,,, | Performed by: RADIOLOGY

## 2022-11-07 PROCEDURE — 77067 SCR MAMMO BI INCL CAD: CPT | Mod: 26,,, | Performed by: RADIOLOGY

## 2022-11-07 PROCEDURE — 77067 SCR MAMMO BI INCL CAD: CPT | Mod: TC

## 2022-11-07 PROCEDURE — 77067 MAMMO DIGITAL SCREENING BILAT WITH TOMO: ICD-10-PCS | Mod: 26,,, | Performed by: RADIOLOGY

## 2022-11-07 PROCEDURE — 77080 DXA BONE DENSITY AXIAL: CPT | Mod: TC

## 2022-11-07 PROCEDURE — 77063 BREAST TOMOSYNTHESIS BI: CPT | Mod: 26,,, | Performed by: RADIOLOGY

## 2022-11-09 ENCOUNTER — OFFICE VISIT (OUTPATIENT)
Dept: OPHTHALMOLOGY | Facility: CLINIC | Age: 72
End: 2022-11-09

## 2022-11-09 VITALS — WEIGHT: 211 LBS | HEIGHT: 62 IN | BODY MASS INDEX: 38.83 KG/M2

## 2022-11-09 DIAGNOSIS — H35.3131 EARLY DRY STAGE NONEXUDATIVE AGE-RELATED MACULAR DEGENERATION OF BOTH EYES: ICD-10-CM

## 2022-11-09 DIAGNOSIS — H18.513 CORNEAL GUTTATA OF BOTH EYES: Primary | ICD-10-CM

## 2022-11-09 PROCEDURE — 99213 OFFICE O/P EST LOW 20 MIN: CPT | Mod: PBBFAC,PO | Performed by: STUDENT IN AN ORGANIZED HEALTH CARE EDUCATION/TRAINING PROGRAM

## 2022-11-09 PROCEDURE — 92134 CPTRZ OPH DX IMG PST SGM RTA: CPT | Mod: PBBFAC,PO | Performed by: OPHTHALMOLOGY

## 2022-11-09 PROCEDURE — 92134 CPTRZ OPH DX IMG PST SGM RTA: CPT | Mod: PBBFAC,PO | Performed by: STUDENT IN AN ORGANIZED HEALTH CARE EDUCATION/TRAINING PROGRAM

## 2022-11-09 NOTE — PROGRESS NOTES
HPI     Pseudophakia OU     Additional comments:   CEIOL OD 8/5/2021/ OS  7/13/2021           Comments    Annual DFE           Last edited by Kamilah Moreno MA on 11/9/2022 12:38 PM.            Assessment /Plan     For exam results, see Encounter Report.    Corneal guttata of both eyes    Early dry stage nonexudative age-related macular degeneration of both eyes                 OCTmac  11/9/22  OD: 295; mild subfoveal drusen; no edema; FC intact  OS: 296; trace subfoveal drusen; no edema; FC intact      1. PCIOL OD  - Performed 8/5/21. Doing well  - MRX 9/2021  - Can follow up PRN.    2. PCIOL OS  - Performed 7/13/21.  - No visual complaints, Rx to 20/20.  - Monitor    3. Guttae OU  - monitor, asymptomatic at this time  - Annual exam    4. Early Dry AMD, OU  - OCT with minimal drusen, correlates with exam; no edema  - CTM annual DFE and OCTm  - previous smoker; quit years ago.     RTC in 12 months for annual DFE, OCTmac

## 2022-12-22 ENCOUNTER — TELEPHONE (OUTPATIENT)
Dept: INTERNAL MEDICINE | Facility: CLINIC | Age: 72
End: 2022-12-22

## 2022-12-22 DIAGNOSIS — N18.32 STAGE 3B CHRONIC KIDNEY DISEASE: Primary | ICD-10-CM

## 2022-12-22 DIAGNOSIS — I10 PRIMARY HYPERTENSION: ICD-10-CM

## 2022-12-22 NOTE — TELEPHONE ENCOUNTER
----- Message from BIRGIT Stafford sent at 7/25/2022  9:48 AM CDT -----  Regarding: Labs  Order wellness labs for January appt

## 2023-01-17 ENCOUNTER — PATIENT MESSAGE (OUTPATIENT)
Dept: INTERNAL MEDICINE | Facility: CLINIC | Age: 73
End: 2023-01-17

## 2023-01-17 DIAGNOSIS — I10 ESSENTIAL (PRIMARY) HYPERTENSION: ICD-10-CM

## 2023-01-18 RX ORDER — LOSARTAN POTASSIUM 25 MG/1
50 TABLET ORAL DAILY
Qty: 90 TABLET | Refills: 0 | Status: SHIPPED | OUTPATIENT
Start: 2023-01-18 | End: 2023-01-25 | Stop reason: DRUGHIGH

## 2023-01-24 ENCOUNTER — LAB VISIT (OUTPATIENT)
Dept: LAB | Facility: HOSPITAL | Age: 73
End: 2023-01-24
Attending: NURSE PRACTITIONER

## 2023-01-24 DIAGNOSIS — I10 PRIMARY HYPERTENSION: ICD-10-CM

## 2023-01-24 DIAGNOSIS — N18.32 STAGE 3B CHRONIC KIDNEY DISEASE: ICD-10-CM

## 2023-01-24 LAB
ALBUMIN SERPL-MCNC: 4 G/DL (ref 3.4–4.8)
ALBUMIN/GLOB SERPL: 1.4 RATIO (ref 1.1–2)
ALP SERPL-CCNC: 109 UNIT/L (ref 40–150)
ALT SERPL-CCNC: 18 UNIT/L (ref 0–55)
APPEARANCE UR: CLEAR
AST SERPL-CCNC: 21 UNIT/L (ref 5–34)
BACTERIA #/AREA URNS AUTO: ABNORMAL /HPF
BASOPHILS # BLD AUTO: 0.08 X10(3)/MCL (ref 0–0.2)
BASOPHILS NFR BLD AUTO: 1.2 %
BILIRUB UR QL STRIP.AUTO: NEGATIVE MG/DL
BILIRUBIN DIRECT+TOT PNL SERPL-MCNC: 0.7 MG/DL
BUN SERPL-MCNC: 28 MG/DL (ref 9.8–20.1)
CALCIUM SERPL-MCNC: 9.6 MG/DL (ref 8.4–10.2)
CHLORIDE SERPL-SCNC: 109 MMOL/L (ref 98–107)
CHOLEST SERPL-MCNC: 235 MG/DL
CHOLEST/HDLC SERPL: 4 {RATIO} (ref 0–5)
CO2 SERPL-SCNC: 26 MMOL/L (ref 23–31)
COLOR UR AUTO: ABNORMAL
CREAT SERPL-MCNC: 1.37 MG/DL (ref 0.55–1.02)
EOSINOPHIL # BLD AUTO: 0.4 X10(3)/MCL (ref 0–0.9)
EOSINOPHIL NFR BLD AUTO: 5.9 %
ERYTHROCYTE [DISTWIDTH] IN BLOOD BY AUTOMATED COUNT: 12 % (ref 11.5–17)
GFR SERPLBLD CREATININE-BSD FMLA CKD-EPI: 42 MLS/MIN/1.73/M2
GLOBULIN SER-MCNC: 2.8 GM/DL (ref 2.4–3.5)
GLUCOSE SERPL-MCNC: 104 MG/DL (ref 82–115)
GLUCOSE UR QL STRIP.AUTO: NORMAL MG/DL
HCT VFR BLD AUTO: 44.5 % (ref 37–47)
HDLC SERPL-MCNC: 54 MG/DL (ref 35–60)
HGB BLD-MCNC: 14.5 GM/DL (ref 12–16)
HYALINE CASTS #/AREA URNS LPF: ABNORMAL /LPF
IMM GRANULOCYTES # BLD AUTO: 0.02 X10(3)/MCL (ref 0–0.04)
IMM GRANULOCYTES NFR BLD AUTO: 0.3 %
KETONES UR QL STRIP.AUTO: NEGATIVE MG/DL
LDLC SERPL CALC-MCNC: 150 MG/DL (ref 50–140)
LEUKOCYTE ESTERASE UR QL STRIP.AUTO: NEGATIVE UNIT/L
LYMPHOCYTES # BLD AUTO: 1.59 X10(3)/MCL (ref 0.6–4.6)
LYMPHOCYTES NFR BLD AUTO: 23.5 %
MCH RBC QN AUTO: 33 PG
MCHC RBC AUTO-ENTMCNC: 32.6 MG/DL (ref 33–36)
MCV RBC AUTO: 101.1 FL (ref 80–94)
MONOCYTES # BLD AUTO: 0.69 X10(3)/MCL (ref 0.1–1.3)
MONOCYTES NFR BLD AUTO: 10.2 %
MUCOUS THREADS URNS QL MICRO: ABNORMAL /LPF
NEUTROPHILS # BLD AUTO: 3.99 X10(3)/MCL (ref 2.1–9.2)
NEUTROPHILS NFR BLD AUTO: 58.9 %
NITRITE UR QL STRIP.AUTO: NEGATIVE
NRBC BLD AUTO-RTO: 0 %
PH UR STRIP.AUTO: 6.5 [PH]
PLATELET # BLD AUTO: 222 X10(3)/MCL (ref 130–400)
PMV BLD AUTO: 10.1 FL (ref 7.4–10.4)
POTASSIUM SERPL-SCNC: 4.6 MMOL/L (ref 3.5–5.1)
PROT SERPL-MCNC: 6.8 GM/DL (ref 5.8–7.6)
PROT UR QL STRIP.AUTO: ABNORMAL MG/DL
RBC # BLD AUTO: 4.4 X10(6)/MCL (ref 4.2–5.4)
RBC #/AREA URNS AUTO: ABNORMAL /HPF
RBC UR QL AUTO: NEGATIVE UNIT/L
SODIUM SERPL-SCNC: 142 MMOL/L (ref 136–145)
SP GR UR STRIP.AUTO: 1.02
SQUAMOUS #/AREA URNS LPF: ABNORMAL /HPF
TRIGL SERPL-MCNC: 154 MG/DL (ref 37–140)
TSH SERPL-ACNC: 1.92 UIU/ML (ref 0.35–4.94)
UROBILINOGEN UR STRIP-ACNC: NORMAL MG/DL
VLDLC SERPL CALC-MCNC: 31 MG/DL
WBC # SPEC AUTO: 6.8 X10(3)/MCL (ref 4.5–11.5)
WBC #/AREA URNS AUTO: ABNORMAL /HPF

## 2023-01-24 PROCEDURE — 84443 ASSAY THYROID STIM HORMONE: CPT

## 2023-01-24 PROCEDURE — 80053 COMPREHEN METABOLIC PANEL: CPT

## 2023-01-24 PROCEDURE — 81001 URINALYSIS AUTO W/SCOPE: CPT

## 2023-01-24 PROCEDURE — 36415 COLL VENOUS BLD VENIPUNCTURE: CPT

## 2023-01-24 PROCEDURE — 85025 COMPLETE CBC W/AUTO DIFF WBC: CPT

## 2023-01-24 PROCEDURE — 80061 LIPID PANEL: CPT

## 2023-01-25 ENCOUNTER — OFFICE VISIT (OUTPATIENT)
Dept: INTERNAL MEDICINE | Facility: CLINIC | Age: 73
End: 2023-01-25

## 2023-01-25 VITALS
SYSTOLIC BLOOD PRESSURE: 138 MMHG | RESPIRATION RATE: 20 BRPM | WEIGHT: 220.63 LBS | TEMPERATURE: 98 F | DIASTOLIC BLOOD PRESSURE: 83 MMHG | HEART RATE: 81 BPM | HEIGHT: 62 IN | BODY MASS INDEX: 40.6 KG/M2

## 2023-01-25 DIAGNOSIS — J44.9 CHRONIC OBSTRUCTIVE PULMONARY DISEASE, UNSPECIFIED COPD TYPE: Primary | ICD-10-CM

## 2023-01-25 DIAGNOSIS — I10 PRIMARY HYPERTENSION: ICD-10-CM

## 2023-01-25 DIAGNOSIS — E78.2 MIXED HYPERLIPIDEMIA: ICD-10-CM

## 2023-01-25 DIAGNOSIS — Z12.11 SCREENING FOR COLON CANCER: ICD-10-CM

## 2023-01-25 DIAGNOSIS — Z00.00 WELLNESS EXAMINATION: ICD-10-CM

## 2023-01-25 DIAGNOSIS — N18.32 STAGE 3B CHRONIC KIDNEY DISEASE: ICD-10-CM

## 2023-01-25 DIAGNOSIS — Z23 NEED FOR VACCINATION: ICD-10-CM

## 2023-01-25 DIAGNOSIS — R06.09 DYSPNEA ON EXERTION: ICD-10-CM

## 2023-01-25 DIAGNOSIS — R06.00 DYSPNEA, UNSPECIFIED TYPE: ICD-10-CM

## 2023-01-25 DIAGNOSIS — G47.33 OBSTRUCTIVE SLEEP APNEA SYNDROME: ICD-10-CM

## 2023-01-25 PROCEDURE — 99215 OFFICE O/P EST HI 40 MIN: CPT | Mod: S$PBB,,, | Performed by: NURSE PRACTITIONER

## 2023-01-25 PROCEDURE — 90677 PCV20 VACCINE IM: CPT | Mod: PBBFAC

## 2023-01-25 PROCEDURE — 99215 OFFICE O/P EST HI 40 MIN: CPT | Mod: PBBFAC | Performed by: NURSE PRACTITIONER

## 2023-01-25 PROCEDURE — 99215 PR OFFICE/OUTPT VISIT, EST, LEVL V, 40-54 MIN: ICD-10-PCS | Mod: S$PBB,,, | Performed by: NURSE PRACTITIONER

## 2023-01-25 RX ORDER — ROSUVASTATIN CALCIUM 40 MG/1
40 TABLET, COATED ORAL NIGHTLY
Qty: 90 TABLET | Refills: 3 | Status: SHIPPED | OUTPATIENT
Start: 2023-01-25 | End: 2023-11-28 | Stop reason: SDUPTHER

## 2023-01-25 RX ORDER — LOSARTAN POTASSIUM 50 MG/1
50 TABLET ORAL DAILY
Qty: 90 TABLET | Refills: 1 | Status: ON HOLD | OUTPATIENT
Start: 2023-01-25 | End: 2023-04-29 | Stop reason: SDUPTHER

## 2023-01-25 NOTE — PROGRESS NOTES
"  BIRGIT Stafford   [facility]  [address] [facility] [address]      PATIENT NAME: Marisel Resendez  : 1950  DATE: 23  MRN: [id number]      Billing Provider: BIRGIT Stafford  Level of Service:   Patient PCP Information       Provider PCP Type    BIRGIT Stafford General            Reason for Visit / Chief Complaint: Follow-up (Lab review), Hypertension, and Fatigue       History of Present Illness / Problem Focused Workflow     Marisel Resendez presents to the clinic with Follow-up (Lab review), Hypertension, and Fatigue     Initial Visit (18): 68 y.o.  female presenting to the clinic to re-establish primary care. Previous PCP MELLY Delatorre NP. PMHx significant for HTN, HLD, CKD Stg III, Solitary Kidney (kidney donor), COPD and DARRON. Seen in Ortho Clinic in 2018 for R knee pain. Bp at goal today. Currently taking Losartan 25 mg po daily. Denies ADR. No refills needed at this time. FLP needed. LDL 78 (2017). Currently taking Atorvastatin 40 mg po daily. Tolerating well. Following Renal Clinic for CKD Stg III. Last OV 2018. Renal indices stable. COPD managed with Advair daily and Duonebs/ProAir inhaler as needed. She reports that she was hospitalized over the past year with pneumonia. She was given Spiriva in addition to her current medications. She does not take the Spiriva every day, only "when I feel I need it." Occasional cough with clear sputum. This is pt's norm. Reports taking Mucinex as needed and using spirometer as previously instructed. Hx of DARRON. Does not use CPAP d/t inability to tolerate. Sleeps with O2 qHS and reports awakening feeling "good and energized." Pt did express that she wanted to mention some forgetfulness lately. Admits occasionally beginning a task and forgetting to complete task. Has pulled out medication before but forgot to take it. Overall, she states, "I'm not concerned " "about it. I understand that I'm getting older. I just thought I would mention it so that we can monitor it." She is also concerned about a dry, erythemic patch to left side of nasal bridge that has been present for many years. She is concerned because she reports heavy sun exposure in younger years with inadequate sun protection. She would like region "checked out." Overall, pt reports that she feels "good." No longer a tobacco user. Uses tobacco creams/ointments PRN for psoriasis flares. States flares are rare. Denies fever, chills, HA, CP, SOB, Abd pain, Dysuria, B/B dysfunction, or any other concerns today.    5/8/19: 68 y.o.  female with PMHx significant for HTN, HLD, CKD Stg III, Solitary Kidney (kidney donor), COPD and DARRON, presenting for routine f/u. Bp at goal today. Currently taking Losartan 25 mg po daily. Renal indices stable. HgA1c 6.0%. . Pt recently had top teeth removed and dentures placed. States that she ate 1 cup of ice cream for an entire week. Pt believes this to be cause of elevated LDL. Plans to resume previous low-fat diet. Taking Atorvastatin 40 mg po daily. Tolerating well. Following Renal Clinic for CKD Stg III. Renal indices have been stable. Seen by Renal this am. Scheduled to f/u in 6 months. Hx of gallstones. Denies any recent abd complaints. COPD managed with Advair daily and Duonebs/ProAir inhaler as needed. Using Spiriva infrequently. Requesting med refills. Previously prescribed Fioricet for migraines. Reports significant improvement in migraines. Very pleased with medication effectiveness. Overall, pt reports that she's been feeling "great." Denies fever, chills, HA, CP, SOB, productive cough, Abd pain, Dysuria, B/B dysfunction, or any other concerns today.    (11/8/19): Ms. Joiner is presenting for a 6-mth f/u. PMHx significant for HTN, HLD, CKD Stg III, Solitary Kidney (kidney donor), COPD and DARRON. She was seen in Renal Clinic 10/9/19. Renal indices stable. She " will return to Renal clinic in 6 mths. Alk remains slightly elevated. Hx of gallstones without any acute complaints. Bp at goal. HgA1c 5.9%. FLP WNL; LDL 58. COPD managed with Advair twice a day, Spiriva as needed, and DuoNebs/Albuterol inhaler PRN. Mammo 10/25/19 negative. Bone Density 6/2018 WNL. Denies fever, chills, HA, CP, SOB, productive cough at present, Abd pain, Dysuria, B/B dysfunction, or any other concerns today.    ED F/u 1/6/20: Ms. Joiner is presenting for an ED f/u. PMHx significant for HTN, HLD, CKD Stg III, Solitary Kidney (kidney donor), COPD and DARRON. She presented to ED 12/30/19 with c/o cough, chills, and generalized body aches x 3 days. She thought she had the flu. Flu swab was negative. She was diagnosed with PNA. CXR revealed: Small, patchy opacity in the left lower lung may relate to atelectasis or pneumonia. She was given prescriptions for codeine-guaifenesin 10mg-100mg/5mL 5mL q6h prn cough and Levaquin 750 mg po daily x 5 days. She's completed the abx. Still using the cough syrup stating that cough and SOB with exertion is about the same. She's also c/o nasal congestion, HAs, and sinus pressure. O2 sat is 94% on RA. This is baseline for pt. She is afebrile at present. No other problems stated.    (7/24/2020): 69 y.o.  female with a PMHx significant for HTN, HLD, CKD Stg III, Solitary Kidney (kidney donor), psoriasis, OA, PNA, COPD and DARRON, presenting for routine f/u. Bp at goal today. Currently taking Losartan 25 mg po daily. Denies ADR. . Currently taking Atorvastatin 40 mg po daily. Tolerating well. Following Renal Clinic for CKD Stg III. Last OV 7/16/20. Renal indices stable. COPD managed with Advair daily and Duonebs/ProAir inhaler as needed. Uses Spiriva as needed. She endorses occasional dyspnea and wheezing with moderate exertion that resolves with rest. She is using O2 via NC at night and occasionally during a nap when she feels fatigued. Today, pt is c/o left hip  "pain x 2-3 weeks, occurring mainly at night when she lies flat. No known injuries. Pain throbbing and aching in quality. Moderate to severe intensity. Provoked by lying flat. Minimal relief with changing positions. Pain interferes with restful sleep. Unable to take NSAIDs. Has been "taking a lot of Tylenol lately" with no relief. Has used Tramadol in the past with some relief. States she was given Tramadol during an ED visit for pain in the past since she can't take "anything else because of my kidneys." She's otherwise doing well. Has started a Weight Watcher's program x 1 week. Has lost 3 lbs. No other problems stated.    Health Maintenance:   Colon Ca Screening-FIT 12/1/19, negative   Breast Ca Screening-Mammo 10/25/19, negative   Lung Ca Screening-declines    (1/25/2021): 70 y.o.  female with a PMHx significant for HTN, HLD, CKD Stg III, Solitary Kidney (kidney donor), psoriasis, OA, PNA, COPD and DARRON, presenting for routine f/u. Bp at goal today. Currently taking Losartan 25 mg po daily. Renal indices stable. She f/u with Renal via telemedicine last week. COPD managed with Advair daily and Duonebs/ProAir inhaler as needed. Uses Spiriva as needed. She endorses occasional dyspnea and wheezing with moderate exertion that resolves with rest. She is using O2 via NC at night and occasionally during a nap when she feels fatigued. She does report an increase in cough and mucous production over the last week that she attributes to the changing climate. She denies any abnl color to mucous, fever, chills, or worsening dyspnea. States sx improved with spirometer and CATE. On CPAP for DARRON. FLP WNL. Taking Atorvastatin and tolerating well. Previously referred to Sx clinic for gallstones. No appt today. C/o epigastric burning. Trying to avoid triggering foods. She had an eye exam at an outside clinic and was diagnosed with cataracts. States she'd noticed a change in vision, trouble seeing print close up, and " cloudiness for a while. Needs a referral to ophthalmology. She is amenable to receiving the Shingrix vaccine. She denies any B/B complaints or falls. States feeling well. No other concerns.  Health Maintenance:   Colon Ca Screening-FIT 12/1/19, negative   Breast Ca Screening-Mammo 10/2020, negative   Lung Ca Screening-declines   Bone Density 10/2020, normal BMD    (7/26/2021): 70 y.o.  female with a PMHx significant for HTN, HLD, CKD Stg III, Solitary Kidney (kidney donor), psoriasis, OA, PNA, COPD and DARRON, presenting for routine f/u. Bp at goal today. Currently taking Losartan 25 mg po daily. Renal indices stable. No microalb on urine test. TSH WNL. She continues to follow renal clinic. COPD managed with Advair daily and Duonebs/ProAir inhaler as needed. Uses Spiriva as needed. She endorses occasional dyspnea and wheezing with moderate exertion that resolves with rest. She is using O2 via NC at night and occasionally during a nap when she feels fatigued. No changes from baseline. She would like a refill on benzonatate for PRN use cough. She's been evaluated in Sx clinic of gallstones. Due to higher surgical candidate risk given comorbidities, it was decided that pt would opt out of any unnecessary surgical procedures. Admits epigastric discomfort improves with omeprazole PRN. Screening mammo due 10/2021. Pt scheduled for cataract sx next month. She is requesting medication refills. No acute concerns.    (1/27/2022): 71 y.o.  female with a PMHx significant for HTN, HLD, CKD Stg III, Solitary Kidney (kidney donor), psoriasis, OA, PNA, COPD and DARRON, presenting for routine f/u. Bp at goal today. Currently taking Losartan 25 mg po daily. Renal indices stable. No microalb on urine test. She did see Renal Clinic 1/24/22. Noted stable with RTC 6 mths. Other labs reviewed and stable compared to baseline. Patient presented to ED on 1/7/22 with c/o left knee pain. Patient states her dogs hit the back of her  "left leg about three weeks ago and it "popped." Endorses pain to lateral aspect of knee and swelling. Has tried Tylenol for pain with no relief. Certain positions makes pain worse, movement makes pain better by decreasing "soreness and stiffness." In ER, XR of knee negative; prescribed prednisone and Voltaren. Appreciates a referral to Ortho. Denies falls. Reports "my lungs are doing well." She uses her spirometer regularly. Maintained with Advair and Devin PRN. No other concerns at this time.     Mammogram 11/2021 negative.    7/25/22: Patient presenting for routine f/u. VSS. Pt went to lab last week but only an A1c was drawn despite having labs orders in for her Renal appt today. A1c was WNL at 5.5. She reports that she's been feeling well. No new or worsening baseline dyspnea or chest pain.  has been Ortho for OA to knees. S/p CSI which helped. Admits working on weight loss to improve knee stability and pain. Requesting refills on Advair and Benzonatate (states uses PRN cough). No recent COPD exacerbations. Due for FIT this month, DXA in October, and mammogram in November. She is amenable to all screenings. No other concerns.    1/25/23: Patient presenting for routine f/u. She contacted clinic about one week ago reporting Bp was above goal (150s-160s/80s-90s). She was taking Losartan 25 mg po daily. She was instructed to increase Losartan to 50 mg po daily. States Bp has improved since doing so. Bp is at goal today. She is endorsing increased dyspnea (trouble ambulating 100ft or greater) and fatigue. Admits checking o2 saturation at home and noting numbers in the low 90s. States feels better when saturation is in the mid-90s. Admits having oxygen at home but primarily uses qHS due to fear of dependency. She does not endorse chest pain or increased LE edema. She does have a h/o DARRON but is not under treatment due to inability to tolerate face mask. She recently completed labs that were significant for an abnl " FLP (worsened from previous). Admits following a low-fat diet and taking Atorvastatin 40 mg as prescribed. Other labs stable compared to baseline. She had a normal mammogram and DXA in November. She is amenable to pneumonia vaccine per recs. She has no other concerns.     Follow-up  Associated symptoms include fatigue. Pertinent negatives include no chest pain.   Hypertension  Associated symptoms include shortness of breath. Pertinent negatives include no chest pain.   Fatigue  Associated symptoms include fatigue. Pertinent negatives include no chest pain.     Review of Systems     Review of Systems   Constitutional:  Positive for fatigue.   HENT: Negative.     Eyes: Negative.    Respiratory:  Positive for shortness of breath.    Cardiovascular: Negative.  Negative for chest pain and leg swelling.   Gastrointestinal: Negative.    Endocrine: Negative.    Genitourinary: Negative.    Musculoskeletal: Negative.    Skin: Negative.    Allergic/Immunologic: Negative.    Neurological: Negative.    Hematological: Negative.    Psychiatric/Behavioral: Negative.     All other systems reviewed and are negative.    Medical / Social / Family History     Past Medical History:   Diagnosis Date    Anxiety disorder, unspecified     CKD (chronic kidney disease)     COPD (chronic obstructive pulmonary disease)     Dyslipidemia     History of radical nephrectomy     HTN (hypertension)     DARRON (obstructive sleep apnea)        Past Surgical History:   Procedure Laterality Date    bilateral oophrectomy      CATARACT EXTRACTION      CATARACT EXTRACTION W/ INTRAOCULAR LENS IMPLANT Right 08/05/2021    CATARACT EXTRACTION W/ INTRAOCULAR LENS IMPLANT Left 07/13/2021    HYSTERECTOMY  1997       Social History  Ms.  reports that she quit smoking about 6 years ago. Her smoking use included cigarettes. She has never used smokeless tobacco. She reports that she does not currently use alcohol. She reports that she does not currently use  drugs.    Family History  MsJamal's family history includes Cancer in her mother; Heart attack in her father; Heart failure in her father.    Medications and Allergies     Medications  Medication List with Changes/Refills   New Medications    FLUTICASONE-UMECLIDIN-VILANTER (TRELEGY ELLIPTA) 100-62.5-25 MCG DSDV    Inhale 1 puff into the lungs once daily.    LOSARTAN (COZAAR) 50 MG TABLET    Take 1 tablet (50 mg total) by mouth once daily.    ROSUVASTATIN (CRESTOR) 40 MG TAB    Take 1 tablet (40 mg total) by mouth every evening.   Current Medications    ADVAIR DISKUS 100-50 MCG/DOSE DISKUS INHALER    Inhale 1 puff into the lungs 2 (two) times daily.    ALBUTEROL (PROVENTIL/VENTOLIN HFA) 90 MCG/ACTUATION INHALER    Inhale 1 puff into the lungs every 6 (six) hours as needed.    ALBUTEROL-IPRATROPIUM (DUO-NEB) 2.5 MG-0.5 MG/3 ML NEBULIZER SOLUTION    Inhale 3 mLs into the lungs 4 (four) times daily as needed.    BENZONATATE (TESSALON) 100 MG CAPSULE    Take 1 capsule (100 mg total) by mouth 3 (three) times daily as needed for Cough.    CALCIUM CARBONATE (OS-GINGER) 500 MG CALCIUM (1,250 MG) TABLET    Take 1 tablet by mouth 2 (two) times daily.    CETIRIZINE (ZYRTEC) 10 MG TABLET    Take 10 mg by mouth Daily.    DICLOFENAC SODIUM (VOLTAREN) 1 % GEL    Apply topically 4 (four) times daily as needed.    OMEPRAZOLE (PRILOSEC) 20 MG CAPSULE    Take 1 capsule (20 mg total) by mouth once daily.   Discontinued Medications    ATORVASTATIN (LIPITOR) 40 MG TABLET    Take 40 mg by mouth once daily.    LOSARTAN (COZAAR) 25 MG TABLET    Take 2 tablets (50 mg total) by mouth once daily.       Allergies  Review of patient's allergies indicates:  No Known Allergies    Physical Examination     Vitals:    01/25/23 0751   BP: 138/83   Pulse: 81   Resp: 20   Temp: 97.6 °F (36.4 °C)     Physical Exam  Constitutional:       Appearance: Normal appearance. She is obese.   HENT:      Head: Normocephalic and atraumatic.      Mouth/Throat:      Mouth:  Mucous membranes are moist.   Eyes:      Extraocular Movements: Extraocular movements intact.      Conjunctiva/sclera: Conjunctivae normal.      Pupils: Pupils are equal, round, and reactive to light.   Cardiovascular:      Rate and Rhythm: Normal rate and regular rhythm.      Pulses: Normal pulses.      Heart sounds: Normal heart sounds.   Pulmonary:      Effort: Pulmonary effort is normal.      Breath sounds: Normal breath sounds.   Abdominal:      General: Bowel sounds are normal.      Palpations: Abdomen is soft.   Musculoskeletal:         General: Normal range of motion.      Cervical back: Normal range of motion.      Right lower leg: No edema.      Left lower leg: No edema.   Skin:     General: Skin is warm and dry.   Neurological:      General: No focal deficit present.      Mental Status: She is alert and oriented to person, place, and time.   Psychiatric:         Mood and Affect: Mood normal.         Behavior: Behavior normal.         Thought Content: Thought content normal.         Judgment: Judgment normal.         Results     Lab Results   Component Value Date    WBC 6.8 01/24/2023    RBC 4.40 01/24/2023    HGB 14.5 01/24/2023    HCT 44.5 01/24/2023    .1 (H) 01/24/2023    MCH 33.0 01/24/2023    MCHC 32.6 (L) 01/24/2023    RDW 12.0 01/24/2023     01/24/2023    MPV 10.1 01/24/2023     CMP  Sodium Level   Date Value Ref Range Status   01/24/2023 142 136 - 145 mmol/L Final     Potassium Level   Date Value Ref Range Status   01/24/2023 4.6 3.5 - 5.1 mmol/L Final     Carbon Dioxide   Date Value Ref Range Status   01/24/2023 26 23 - 31 mmol/L Final     Blood Urea Nitrogen   Date Value Ref Range Status   01/24/2023 28.0 (H) 9.8 - 20.1 mg/dL Final     Creatinine   Date Value Ref Range Status   01/24/2023 1.37 (H) 0.55 - 1.02 mg/dL Final     Calcium Level Total   Date Value Ref Range Status   01/24/2023 9.6 8.4 - 10.2 mg/dL Final     Albumin Level   Date Value Ref Range Status   01/24/2023 4.0 3.4  - 4.8 g/dL Final     Bilirubin Total   Date Value Ref Range Status   01/24/2023 0.7 <=1.5 mg/dL Final     Alkaline Phosphatase   Date Value Ref Range Status   01/24/2023 109 40 - 150 unit/L Final     Aspartate Aminotransferase   Date Value Ref Range Status   01/24/2023 21 5 - 34 unit/L Final     Alanine Aminotransferase   Date Value Ref Range Status   01/24/2023 18 0 - 55 unit/L Final     Estimated GFR-Non    Date Value Ref Range Status   07/25/2022 37 mls/min/1.73/m2 Final     Lab Results   Component Value Date    CHOL 235 (H) 01/24/2023     Lab Results   Component Value Date    HDL 54 01/24/2023     No results found for: LDLCALC  Lab Results   Component Value Date    TRIG 154 (H) 01/24/2023     No results found for: CHOLHDL  Lab Results   Component Value Date    TSH 1.922 01/24/2023     Lab Results   Component Value Date    PHUR 5.5 11/08/2018    PROTEINUA Trace (A) 01/24/2023    GLUCUA Normal 11/08/2018    KETONESU Negative 11/08/2018    OCCULTUA Negative 11/08/2018    NITRITE Negative 11/08/2018    LEUKOCYTESUR Negative 01/24/2023           Assessment and Plan (including Health Maintenance)     Plan:         Health Maintenance Due   Topic Date Due    Colorectal Cancer Screening  Never done    COVID-19 Vaccine (3 - Booster for Pfizer series) 05/07/2021       Problem List Items Addressed This Visit          Pulmonary    Chronic obstructive pulmonary disease - Primary    Current Assessment & Plan     COPD Management:  Maintenance - Advair twice a day, Spiriva as needed, Home O2 PRN  Rescue- DuoNebs/Albuterol inhaler PRN   COPD Exacerbation Prevention  Immunizations-  Influenza Vaccine yearly  Pneumococcal Vaccine  Tobacco Cessation if applicable  Avoid allergens       Pneumonia vaccine today  Update PFTs  Try to obtain Trelegy. Continue Advair until able to obtain Trelegy  Continue use of O2 as needed         Relevant Medications    fluticasone-umeclidin-vilanter (TRELEGY ELLIPTA) 100-62.5-25 mcg  "DsDv    Other Relevant Orders    Complete PFT with bronchodilator       Cardiac/Vascular    Hyperlipidemia    Current Assessment & Plan     Stop Atorvastatin 40 mg po daily  Start Rosuvastatin 40 mg po daily  Educated on a low-fat, low-cholesterol diet and aerobic exercise (20-30 mins/day x 5 days a week). Encouraged healthy fruits and veggie intake. Increase water intake. Avoid excess ETOH intake and smoking           Relevant Medications    rosuvastatin (CRESTOR) 40 MG Tab    Other Relevant Orders    Lipid Panel    Comprehensive Metabolic Panel    Hypertension    Current Assessment & Plan     Improved with Losartan 50 mg po daily. Continue  Educated on aerobic exercise (3-5 days/week) and a low-fat, low-sodium diet  Avoid excess ETOH consumption. Smoking cessation if applicable  ED precautions (s/s of CVA, etc)             Relevant Medications    losartan (COZAAR) 50 MG tablet    Dyspnea on exertion    Current Assessment & Plan     Echo  PFTs  See "COPD"  Use O2 as needed  ED precautions            Renal/    Stage 3 chronic kidney disease    Current Assessment & Plan     Stable  Keep upcoming f/u with renal            Other    Obstructive sleep apnea syndrome    Relevant Orders    Ambulatory referral/consult to Sleep Disorders    Wellness examination    Overview     Health Maintenance:   Colon Ca Screening-FIT 7/28/21, negative   Breast Ca Screening-Mammo 11/7/2022, negative   Lung Ca Screening-declines   Bone Density 11/7/2022, normal BMD            Other Visit Diagnoses       Need for vaccination        Relevant Orders    Pneumococcal Conjugate Vaccine (20 Valent) (IM) (Completed)    Dyspnea, unspecified type        Relevant Orders    Echo    Screening for colon cancer        Relevant Orders    Cologuard Screening (Multitarget Stool DNA)              Health Maintenance Topics with due status: Not Due       Topic Last Completion Date    TETANUS VACCINE 01/05/2017    Hemoglobin A1c (Prediabetes) 07/21/2022    " Mammogram 11/07/2022    DEXA Scan 11/07/2022    Lipid Panel 01/24/2023       Future Appointments   Date Time Provider Department Center   1/30/2023  9:15 AM BIRGIT Licea [facility]   4/25/2023  7:30 AM BIRGIT Stafford [facility]   11/2/2023  7:45 AM [facility]        I spent a total of 57 minutes on the day of the visit.  This includes face to face time and non-face to face time preparing to see the patient (eg, review of tests), obtaining and/or reviewing separately obtained history, documenting clinical information in the electronic or other health record, independently interpreting results and communicating results to the patient/family/caregiver, or care coordinator.      Signature:  BIRGIT Stafford  [facility]  [address] [facility] [id number]    Date of encounter: 1/25/23

## 2023-01-25 NOTE — ASSESSMENT & PLAN NOTE
COPD Management:  Maintenance - Advair twice a day, Spiriva as needed, Home O2 PRN  Rescue- DuoNebs/Albuterol inhaler PRN   COPD Exacerbation Prevention  Immunizations-  Influenza Vaccine yearly  Pneumococcal Vaccine  Tobacco Cessation if applicable  Avoid allergens       Pneumonia vaccine today  Update PFTs  Try to obtain Trelegy. Continue Advair until able to obtain Trelegy  Continue use of O2 as needed

## 2023-01-25 NOTE — ASSESSMENT & PLAN NOTE
Stop Atorvastatin 40 mg po daily  Start Rosuvastatin 40 mg po daily  Educated on a low-fat, low-cholesterol diet and aerobic exercise (20-30 mins/day x 5 days a week). Encouraged healthy fruits and veggie intake. Increase water intake. Avoid excess ETOH intake and smoking

## 2023-01-25 NOTE — ASSESSMENT & PLAN NOTE
Improved with Losartan 50 mg po daily. Continue  Educated on aerobic exercise (3-5 days/week) and a low-fat, low-sodium diet  Avoid excess ETOH consumption. Smoking cessation if applicable  ED precautions (s/s of CVA, etc)

## 2023-01-30 ENCOUNTER — PATIENT MESSAGE (OUTPATIENT)
Dept: INTERNAL MEDICINE | Facility: CLINIC | Age: 73
End: 2023-01-30

## 2023-01-30 ENCOUNTER — OFFICE VISIT (OUTPATIENT)
Dept: NEPHROLOGY | Facility: CLINIC | Age: 73
End: 2023-01-30

## 2023-01-30 VITALS
RESPIRATION RATE: 20 BRPM | HEART RATE: 87 BPM | BODY MASS INDEX: 40.12 KG/M2 | WEIGHT: 218 LBS | OXYGEN SATURATION: 96 % | DIASTOLIC BLOOD PRESSURE: 83 MMHG | SYSTOLIC BLOOD PRESSURE: 137 MMHG | HEIGHT: 62 IN | TEMPERATURE: 98 F

## 2023-01-30 DIAGNOSIS — I10 PRIMARY HYPERTENSION: ICD-10-CM

## 2023-01-30 DIAGNOSIS — N18.32 CKD STAGE G3B/A1, GFR 30-44 AND ALBUMIN CREATININE RATIO <30 MG/G: Primary | ICD-10-CM

## 2023-01-30 PROCEDURE — 99214 OFFICE O/P EST MOD 30 MIN: CPT | Mod: S$PBB,,, | Performed by: NURSE PRACTITIONER

## 2023-01-30 PROCEDURE — 99214 PR OFFICE/OUTPT VISIT, EST, LEVL IV, 30-39 MIN: ICD-10-PCS | Mod: S$PBB,,, | Performed by: NURSE PRACTITIONER

## 2023-01-30 PROCEDURE — 99215 OFFICE O/P EST HI 40 MIN: CPT | Mod: PBBFAC | Performed by: NURSE PRACTITIONER

## 2023-01-30 NOTE — PROGRESS NOTES
"[facility]  Nephrology Clinic Note    Chief complaint: Chronic Kidney Disease (Follow up)    History of present illness:   Marisel Resendez is a 72 y.o. White female with past medical history of chronic kidney disease stage III, solitary kidney (she is a kidney donor, status post radical nephrectomy in 2003), hypertension, dyslipidemia, anxiety, COPD, and DARRON (not on NIPPV). She presents for follow-up appointment in nephrology clinic.    Review of Systems  12 point review of systems conducted, negative except as stated in the history of present illness.    Allergies: Patient has No Known Allergies.     Past Medical History:  has a past medical history of Anxiety disorder, unspecified, CKD (chronic kidney disease), COPD (chronic obstructive pulmonary disease), Dyslipidemia, History of radical nephrectomy, HTN (hypertension), and DARRON (obstructive sleep apnea).    Procedure History:  has a past surgical history that includes Cataract extraction w/ intraocular lens implant (Right, 08/05/2021); Cataract extraction w/ intraocular lens implant (Left, 07/13/2021); Hysterectomy (1997); bilateral oophrectomy; and Cataract extraction.    Family History: family history includes Cancer in her mother; Heart attack in her father; Heart failure in her father.    Social History:  reports that she quit smoking about 6 years ago. Her smoking use included cigarettes. She has never used smokeless tobacco. She reports that she does not currently use alcohol. She reports that she does not currently use drugs.    Physical exam  /83 (BP Location: Left arm, Patient Position: Sitting, BP Method: Large (Automatic))   Pulse 87   Temp 98.1 °F (36.7 °C) (Oral)   Resp 20   Ht 5' 2" (1.575 m)   Wt 98.9 kg (218 lb)   SpO2 96%   BMI 39.87 kg/m²   General appearance: Patient is in no acute distress.  Skin: No rashes or wounds.  HEENT: PERRLA, EOMI, no scleral icterus, no JVD. Neck is supple.  Chest: " Respirations are unlabored. Lungs sounds are clear.   Heart: S1, S2.   Abdomen: Benign.  : Deferred.  Extremities: No edema, peripheral pulses are palpable.   Neuro: No focal deficits.     Home Medications:    Current Outpatient Medications:     ADVAIR DISKUS 100-50 mcg/dose diskus inhaler, Inhale 1 puff into the lungs 2 (two) times daily., Disp: 60 each, Rfl: 6    albuterol (PROVENTIL/VENTOLIN HFA) 90 mcg/actuation inhaler, Inhale 1 puff into the lungs every 6 (six) hours as needed., Disp: , Rfl:     albuterol-ipratropium (DUO-NEB) 2.5 mg-0.5 mg/3 mL nebulizer solution, Inhale 3 mLs into the lungs 4 (four) times daily as needed., Disp: , Rfl:     benzonatate (TESSALON) 100 MG capsule, Take 1 capsule (100 mg total) by mouth 3 (three) times daily as needed for Cough., Disp: 30 capsule, Rfl: 0    calcium carbonate (OS-GINGER) 500 mg calcium (1,250 mg) tablet, Take 1 tablet by mouth 2 (two) times daily., Disp: , Rfl:     cetirizine (ZYRTEC) 10 MG tablet, Take 10 mg by mouth Daily., Disp: , Rfl:     diclofenac sodium (VOLTAREN) 1 % Gel, Apply topically 4 (four) times daily as needed., Disp: , Rfl:     losartan (COZAAR) 50 MG tablet, Take 1 tablet (50 mg total) by mouth once daily., Disp: 90 tablet, Rfl: 1    omeprazole (PRILOSEC) 20 MG capsule, Take 1 capsule (20 mg total) by mouth once daily., Disp: 90 capsule, Rfl: 1    rosuvastatin (CRESTOR) 40 MG Tab, Take 1 tablet (40 mg total) by mouth every evening., Disp: 90 tablet, Rfl: 3    fluticasone-umeclidin-vilanter (TRELEGY ELLIPTA) 100-62.5-25 mcg DsDv, Inhale 1 puff into the lungs once daily. (Patient not taking: Reported on 1/30/2023), Disp: 60 each, Rfl: 6    Laboratory data    Hematology  Lab Results   Component Value Date    WBC 6.8 01/24/2023    HGB 14.5 01/24/2023    HCT 44.5 01/24/2023     01/24/2023     Chemistry  Lab Results   Component Value Date     01/24/2023    K 4.6 01/24/2023    CHLORIDE 109 (H) 01/24/2023    CO2 26 01/24/2023    BUN 28.0 (H)  01/24/2023    CREATININE 1.37 (H) 01/24/2023    EGFRNORACEVR 42 01/24/2023    GLUCOSE 104 01/24/2023    CALCIUM 9.6 01/24/2023    ALKPHOS 109 01/24/2023    LABPROT 6.8 01/24/2023    ALBUMIN 4.0 01/24/2023    BILIDIR 0.2 01/11/2019    IBILI 0.3 01/11/2019    AST 21 01/24/2023    ALT 18 01/24/2023    MG 2.1 11/08/2018    PHOS 3.0 11/08/2018      Urine:  Lab Results   Component Value Date    COLORUA Light-Yellow 01/24/2023    APPEARANCEUA Clear 01/24/2023    SGUA 1.025 01/24/2023    PHUA 6.5 01/24/2023    PROTEINUA Trace (A) 01/24/2023    GLUCOSEUA Normal 01/24/2023    KETONESUA Negative 01/24/2023    BLOODUA Negative 01/24/2023    NITRITESUA Negative 01/24/2023    LEUKOCYTESUR Negative 01/24/2023    RBCUA 0-5 01/24/2023    WBCUA 0-5 01/24/2023    BACTERIA None Seen 01/24/2023    SQEPUA Few (A) 01/24/2023    HYALINECASTS None Seen 01/24/2023    CREATRANDUR 243.0 11/08/2018    PROTEINURINE 43.9 11/08/2018         Lab Results   Component Value Date    HGBA1C 5.5 07/21/2022    GLUCOSE 104 01/24/2023     Lab Results   Component Value Date    PTH 54.7 11/08/2018    QPLCLFSF59KE 40.08 11/08/2018       Lab Results   Component Value Date    IRON 84 12/01/2017    TIBC 343 12/01/2017    TRANS 274.0 12/01/2017    LABIRON 24.5 12/01/2017    FERRITIN 68.3 12/01/2017       Imaging  (06/10/2015 08:58 CDT US Retroperitoneum Limited)  Clinical indication: Patient donated right kidney, solitary kidney.    Imaging of the retroperitoneal areas were obtained, with the right  kidney noted be surgically absent with no abnormality seen in the  right renal fossa. The left kidney is normal in appearance measuring  11.5 x 6.0 x 5.2 cm. There is no hydronephrosis, calculi, or evidence  of a solid mass. No perirenal fluid collections are demonstrated.  Renal cortex demonstrates normal echogenicity. The bladder was not  imaged.    Impression: Status post right nephrectomy with normal appearance the  left kidney.    Impression and plan     CKD stage  G3b/A1, GFR 30-44 and albumin creatinine ratio <30 mg/g  -     Comprehensive Metabolic Panel; Future; Expected date: 07/20/2023  -     Protein/Creatinine Ratio, Urine; Future; Expected date: 07/20/2023  -     Urinalysis, Reflex to Urine Culture; Future; Expected date: 07/20/2023    Solitary kidney.  Renal function has been is stable, there is no significant proteinuria.  Continue risk factor management and periodic monitoring.  Continue:  -follow 2 g a day dietary sodium restriction  -control high blood pressure (goal blood pressure is less than 130/80, please check blood pressure twice a week and bring blood pressure logs to office visit)  -exercise at least 30 minutes a day, 5 days a week  -maintain healthy weight  -stay well hydrated (drink water only, avoid juices, sweet tea, and sodas)  -ask about staying up-to-date on vaccinations (flu vaccine, pneumonia vaccine, hepatitis B vaccine)  -avoid excessive use of NSAIDs (ibuprofen, naproxen, Aleve, Advil, Toradol, Mobic), take Tylenol as needed for headache or mild pain  -take cholesterol lowering medications if prescribed (LDL goal less than 100)    Follow-up with the primary care provider as scheduled.  Return to subspecialty nephrology (kidney) clinic with routine labs in 6 months    Primary hypertension  Blood pressure reading is at goal, continue current antihypertensive regimen and 2 g a day dietary sodium restriction.      BMI 40.0-44.9, adult  Lifestyle and dietary interventions discussed, patient counseled on weight loss using portion control, non- sedentary lifestyle, low-carbohydrate/low fat diet.          1/30/2023  Isabel Riojas NP  [facility]

## 2023-02-08 ENCOUNTER — PATIENT MESSAGE (OUTPATIENT)
Dept: ADMINISTRATIVE | Facility: HOSPITAL | Age: 73
End: 2023-02-08

## 2023-02-09 ENCOUNTER — TELEPHONE (OUTPATIENT)
Dept: INTERNAL MEDICINE | Facility: CLINIC | Age: 73
End: 2023-02-09

## 2023-02-09 NOTE — TELEPHONE ENCOUNTER
Lili from [facility] patient assistant pharmacy called and stated that the trelegy is a duplication of therapy with Advair, and both can not be used at once. Lili is asking if the trelegy will be replacing the Advair

## 2023-02-15 NOTE — TELEPHONE ENCOUNTER
Micha from [facility] called to ask if Manan Chapman Was replacing Advair.Told her I would check and call her back.

## 2023-02-15 NOTE — TELEPHONE ENCOUNTER
Returned call and spoke with Joaquina to verify that Manan Chapman Is replacing Advair and noted from provider's note on 02/09/23.Verbalized understanding.

## 2023-02-28 ENCOUNTER — PATIENT MESSAGE (OUTPATIENT)
Dept: INTERNAL MEDICINE | Facility: CLINIC | Age: 73
End: 2023-02-28

## 2023-02-28 ENCOUNTER — HOSPITAL ENCOUNTER (OUTPATIENT)
Dept: CARDIOLOGY | Facility: HOSPITAL | Age: 73
Discharge: HOME | End: 2023-02-28
Attending: NURSE PRACTITIONER

## 2023-02-28 ENCOUNTER — HOSPITAL ENCOUNTER (OUTPATIENT)
Dept: PULMONOLOGY | Facility: HOSPITAL | Age: 73
Discharge: HOME | End: 2023-02-28
Attending: NURSE PRACTITIONER

## 2023-02-28 VITALS
DIASTOLIC BLOOD PRESSURE: 84 MMHG | BODY MASS INDEX: 40.12 KG/M2 | WEIGHT: 218 LBS | HEIGHT: 62 IN | SYSTOLIC BLOOD PRESSURE: 143 MMHG

## 2023-02-28 DIAGNOSIS — J44.9 CHRONIC OBSTRUCTIVE PULMONARY DISEASE, UNSPECIFIED COPD TYPE: ICD-10-CM

## 2023-02-28 DIAGNOSIS — R06.00 DYSPNEA, UNSPECIFIED TYPE: ICD-10-CM

## 2023-02-28 LAB
AV INDEX (PROSTH): 0.74
AV MEAN GRADIENT: 5 MMHG
AV PEAK GRADIENT: 8 MMHG
AV VALVE AREA: 2.22 CM2
AV VELOCITY RATIO: 0.71
BSA FOR ECHO PROCEDURE: 2.08 M2
CV ECHO LV RWT: 0.41 CM
DLCO SINGLE BREATH LLN: 14.31
DLCO SINGLE BREATH PRE REF: 73 %
DLCO SINGLE BREATH REF: 20.04
DLCOC SBVA LLN: 2.79
DLCOC SBVA REF: 4.35
DLCOC SINGLE BREATH LLN: 14.31
DLCOC SINGLE BREATH REF: 20.04
DLCOVA LLN: 2.79
DLCOVA PRE REF: 89.6 %
DLCOVA PRE: 3.9 ML/(MIN*MMHG*L) (ref 2.79–5.91)
DLCOVA REF: 4.35
DOP CALC AO PEAK VEL: 1.45 M/S
DOP CALC AO VTI: 33.7 CM
DOP CALC LVOT AREA: 3 CM2
DOP CALC LVOT DIAMETER: 1.95 CM
DOP CALC LVOT PEAK VEL: 1.03 M/S
DOP CALC LVOT STROKE VOLUME: 74.92 CM3
DOP CALC MV VTI: 28.8 CM
DOP CALCLVOT PEAK VEL VTI: 25.1 CM
E WAVE DECELERATION TIME: 156.11 MSEC
E/A RATIO: 0.91
E/E' RATIO: 14 M/S
ECHO LV POSTERIOR WALL: 0.93 CM (ref 0.6–1.1)
EJECTION FRACTION: 65 %
ERV LLN: -16449.37
ERV PRE REF: 132 %
ERV REF: 0.63
FEF 25 75 CHG: 28.8 %
FEF 25 75 LLN: 0.82
FEF 25 75 POST REF: 121.4 %
FEF 25 75 PRE REF: 94.2 %
FEF 25 75 REF: 1.77
FET100 CHG: -2.4 %
FEV1 CHG: 12.3 %
FEV1 FVC CHG: 2.5 %
FEV1 FVC LLN: 65
FEV1 FVC POST REF: 106.7 %
FEV1 FVC PRE REF: 104.1 %
FEV1 FVC REF: 78
FEV1 LLN: 1.49
FEV1 POST REF: 87.7 %
FEV1 PRE REF: 78.1 %
FEV1 REF: 2.05
FRACTIONAL SHORTENING: 29 % (ref 28–44)
FRCPLETH LLN: 1.78
FRCPLETH PREREF: 125.9 %
FRCPLETH REF: 2.6
FVC CHG: 9.5 %
FVC LLN: 1.92
FVC POST REF: 81.6 %
FVC PRE REF: 74.6 %
FVC REF: 2.64
INTERVENTRICULAR SEPTUM: 1.26 CM (ref 0.6–1.1)
IVC PRE: 2.1 L (ref 1.92–3.39)
IVC SINGLE BREATH LLN: 1.92
IVC SINGLE BREATH PRE REF: 79.5 %
IVC SINGLE BREATH REF: 2.64
LEFT ATRIUM SIZE: 3.83 CM
LEFT INTERNAL DIMENSION IN SYSTOLE: 3.23 CM (ref 2.1–4)
LEFT VENTRICLE DIASTOLIC VOLUME INDEX: 26.63 ML/M2
LEFT VENTRICLE DIASTOLIC VOLUME: 52.72 ML
LEFT VENTRICLE MASS INDEX: 89 G/M2
LEFT VENTRICLE SYSTOLIC VOLUME INDEX: 9.5 ML/M2
LEFT VENTRICLE SYSTOLIC VOLUME: 18.8 ML
LEFT VENTRICULAR INTERNAL DIMENSION IN DIASTOLE: 4.53 CM (ref 3.5–6)
LEFT VENTRICULAR MASS: 175.74 G
LV LATERAL E/E' RATIO: 14 M/S
LV SEPTAL E/E' RATIO: 14 M/S
LVOT MG: 1.86 MMHG
LVOT MV: 0.6 CM/S
MV MEAN GRADIENT: 2 MMHG
MV PEAK A VEL: 0.92 M/S
MV PEAK E VEL: 0.84 M/S
MV PEAK GRADIENT: 3 MMHG
MV STENOSIS PRESSURE HALF TIME: 45.27 MS
MV VALVE AREA BY CONTINUITY EQUATION: 2.6 CM2
MV VALVE AREA P 1/2 METHOD: 4.86 CM2
MVV LLN: 64
MVV PRE REF: 76.5 %
MVV REF: 75
PEF CHG: 52.7 %
PEF LLN: 3.74
PEF POST REF: 84.7 %
PEF PRE REF: 55.5 %
PEF REF: 5.34
PISA TR MAX VEL: 1.6 M/S
POST FEF 25 75: 2.15 L/S (ref 0.82–3.14)
POST FET 100: 6.53 SEC
POST FEV1 FVC: 83.6 % (ref 64.97–89.86)
POST FEV1: 1.8 L (ref 1.49–2.59)
POST FVC: 2.15 L (ref 1.92–3.39)
POST PEF: 4.53 L/S (ref 3.74–6.95)
PRE DLCO: 14.63 ML/(MIN*MMHG) (ref 14.31–25.77)
PRE ERV: 0.83 L (ref -16449.37–16450.63)
PRE FEF 25 75: 1.67 L/S (ref 0.82–3.14)
PRE FET 100: 6.69 SEC
PRE FEV1 FVC: 81.53 % (ref 64.97–89.86)
PRE FEV1: 1.6 L (ref 1.49–2.59)
PRE FRC PL: 3.27 L (ref 1.78–3.42)
PRE FVC: 1.97 L (ref 1.92–3.39)
PRE MVV: 57.27 L/MIN (ref 63.6–86.04)
PRE PEF: 2.96 L/S (ref 3.74–6.95)
PRE RV: 2.44 L (ref 1.39–2.55)
PRE TLC: 4.67 L (ref 3.62–5.59)
RA PRESSURE: 3 MMHG
RAW LLN: 3.06
RAW PRE REF: 144.1 %
RAW PRE: 4.41 CMH2O*S/L (ref 3.06–3.06)
RAW REF: 3.06
RIGHT VENTRICULAR END-DIASTOLIC DIMENSION: 2.55 CM
RV LLN: 1.39
RV PRE REF: 124 %
RV REF: 1.97
RVTLC LLN: 33
RVTLC PRE REF: 122.2 %
RVTLC PRE: 52.26 % (ref 33.17–52.35)
RVTLC REF: 43
TDI LATERAL: 0.06 M/S
TDI SEPTAL: 0.06 M/S
TDI: 0.06 M/S
TLC LLN: 3.62
TLC PRE REF: 101.5 %
TLC REF: 4.6
TR MAX PG: 10 MMHG
TV REST PULMONARY ARTERY PRESSURE: 13 MMHG
VA PRE: 3.75 L (ref 4.45–4.45)
VA SINGLE BREATH LLN: 4.45
VA SINGLE BREATH PRE REF: 84.3 %
VA SINGLE BREATH REF: 4.45
VC LLN: 1.92
VC PRE REF: 84.6 %
VC PRE: 2.23 L (ref 1.92–3.39)
VC REF: 2.64

## 2023-02-28 PROCEDURE — 94729 DIFFUSING CAPACITY: CPT

## 2023-02-28 PROCEDURE — 94726 PLETHYSMOGRAPHY LUNG VOLUMES: CPT

## 2023-02-28 PROCEDURE — 94640 AIRWAY INHALATION TREATMENT: CPT

## 2023-02-28 PROCEDURE — 94060 EVALUATION OF WHEEZING: CPT

## 2023-02-28 PROCEDURE — 93306 TTE W/DOPPLER COMPLETE: CPT

## 2023-03-03 ENCOUNTER — PATIENT MESSAGE (OUTPATIENT)
Dept: INTERNAL MEDICINE | Facility: CLINIC | Age: 73
End: 2023-03-03

## 2023-04-24 ENCOUNTER — LAB VISIT (OUTPATIENT)
Dept: LAB | Facility: HOSPITAL | Age: 73
End: 2023-04-24
Attending: NURSE PRACTITIONER

## 2023-04-24 DIAGNOSIS — E78.2 MIXED HYPERLIPIDEMIA: ICD-10-CM

## 2023-04-24 LAB
ALBUMIN SERPL-MCNC: 4.1 G/DL (ref 3.4–4.8)
ALBUMIN/GLOB SERPL: 1.4 RATIO (ref 1.1–2)
ALP SERPL-CCNC: 121 UNIT/L (ref 40–150)
ALT SERPL-CCNC: 24 UNIT/L (ref 0–55)
AST SERPL-CCNC: 27 UNIT/L (ref 5–34)
BILIRUBIN DIRECT+TOT PNL SERPL-MCNC: 0.6 MG/DL
BUN SERPL-MCNC: 22.1 MG/DL (ref 9.8–20.1)
CALCIUM SERPL-MCNC: 9.7 MG/DL (ref 8.4–10.2)
CHLORIDE SERPL-SCNC: 108 MMOL/L (ref 98–107)
CHOLEST SERPL-MCNC: 142 MG/DL
CHOLEST/HDLC SERPL: 3 {RATIO} (ref 0–5)
CO2 SERPL-SCNC: 27 MMOL/L (ref 23–31)
CREAT SERPL-MCNC: 1.38 MG/DL (ref 0.55–1.02)
GFR SERPLBLD CREATININE-BSD FMLA CKD-EPI: 41 MLS/MIN/1.73/M2
GLOBULIN SER-MCNC: 3 GM/DL (ref 2.4–3.5)
GLUCOSE SERPL-MCNC: 99 MG/DL (ref 82–115)
HDLC SERPL-MCNC: 52 MG/DL (ref 35–60)
LDLC SERPL CALC-MCNC: 72 MG/DL (ref 50–140)
POTASSIUM SERPL-SCNC: 4.6 MMOL/L (ref 3.5–5.1)
PROT SERPL-MCNC: 7.1 GM/DL (ref 5.8–7.6)
SODIUM SERPL-SCNC: 144 MMOL/L (ref 136–145)
TRIGL SERPL-MCNC: 89 MG/DL (ref 37–140)
VLDLC SERPL CALC-MCNC: 18 MG/DL

## 2023-04-24 PROCEDURE — 80061 LIPID PANEL: CPT

## 2023-04-24 PROCEDURE — 80053 COMPREHEN METABOLIC PANEL: CPT

## 2023-04-24 PROCEDURE — 36415 COLL VENOUS BLD VENIPUNCTURE: CPT

## 2023-04-25 ENCOUNTER — OFFICE VISIT (OUTPATIENT)
Dept: INTERNAL MEDICINE | Facility: CLINIC | Age: 73
End: 2023-04-25

## 2023-04-25 VITALS
DIASTOLIC BLOOD PRESSURE: 82 MMHG | HEART RATE: 74 BPM | BODY MASS INDEX: 40.52 KG/M2 | OXYGEN SATURATION: 94 % | WEIGHT: 220.19 LBS | SYSTOLIC BLOOD PRESSURE: 131 MMHG | RESPIRATION RATE: 20 BRPM | HEIGHT: 62 IN | TEMPERATURE: 98 F

## 2023-04-25 DIAGNOSIS — Z00.00 WELLNESS EXAMINATION: ICD-10-CM

## 2023-04-25 DIAGNOSIS — G47.33 OBSTRUCTIVE SLEEP APNEA SYNDROME: ICD-10-CM

## 2023-04-25 DIAGNOSIS — J44.9 CHRONIC OBSTRUCTIVE PULMONARY DISEASE, UNSPECIFIED COPD TYPE: ICD-10-CM

## 2023-04-25 DIAGNOSIS — Z12.11 SCREENING FOR COLON CANCER: ICD-10-CM

## 2023-04-25 DIAGNOSIS — J44.1 COPD EXACERBATION: Primary | ICD-10-CM

## 2023-04-25 DIAGNOSIS — I10 PRIMARY HYPERTENSION: ICD-10-CM

## 2023-04-25 PROCEDURE — 99215 OFFICE O/P EST HI 40 MIN: CPT | Mod: PBBFAC | Performed by: NURSE PRACTITIONER

## 2023-04-25 PROCEDURE — 99214 OFFICE O/P EST MOD 30 MIN: CPT | Mod: S$PBB,,, | Performed by: NURSE PRACTITIONER

## 2023-04-25 PROCEDURE — 99214 PR OFFICE/OUTPT VISIT, EST, LEVL IV, 30-39 MIN: ICD-10-PCS | Mod: S$PBB,,, | Performed by: NURSE PRACTITIONER

## 2023-04-25 RX ORDER — PREDNISONE 10 MG/1
10 TABLET ORAL DAILY
Qty: 5 TABLET | Refills: 0 | Status: SHIPPED | OUTPATIENT
Start: 2023-04-25 | End: 2023-04-30

## 2023-04-25 RX ORDER — OMEPRAZOLE 20 MG/1
20 CAPSULE, DELAYED RELEASE ORAL DAILY
Qty: 90 CAPSULE | Refills: 1 | Status: SHIPPED | OUTPATIENT
Start: 2023-04-25 | End: 2023-11-28 | Stop reason: SDUPTHER

## 2023-04-25 RX ORDER — BENZONATATE 100 MG/1
100 CAPSULE ORAL 3 TIMES DAILY PRN
Qty: 30 CAPSULE | Refills: 1 | Status: SHIPPED | OUTPATIENT
Start: 2023-04-25 | End: 2024-01-10 | Stop reason: SDUPTHER

## 2023-04-25 RX ORDER — AZITHROMYCIN 250 MG/1
TABLET, FILM COATED ORAL
Qty: 6 TABLET | Refills: 0 | Status: SHIPPED | OUTPATIENT
Start: 2023-04-25 | End: 2023-04-30

## 2023-04-25 RX ORDER — IPRATROPIUM BROMIDE AND ALBUTEROL SULFATE 2.5; .5 MG/3ML; MG/3ML
3 SOLUTION RESPIRATORY (INHALATION) 4 TIMES DAILY PRN
Qty: 75 ML | Refills: 6 | Status: SHIPPED | OUTPATIENT
Start: 2023-04-25 | End: 2023-11-28 | Stop reason: SDUPTHER

## 2023-04-25 NOTE — PROGRESS NOTES
"  BIRGIT Stafford   [facility]  [address] [facility] [address]      PATIENT NAME: aMrisel Resendez  : 1950  DATE: 23  MRN: [id number]      Billing Provider: BIRGIT Stafford  Level of Service:   Patient PCP Information       Provider PCP Type    BIRGIT Stafford General            Reason for Visit / Chief Complaint: Follow-up (Lab review)       History of Present Illness / Problem Focused Workflow     Marisel Resendez presents to the clinic with Follow-up (Lab review)     Initial Visit (18): 68 y.o.  female presenting to the clinic to re-establish primary care. Previous PCP MELLY Delatorre NP. PMHx significant for HTN, HLD, CKD Stg III, Solitary Kidney (kidney donor), COPD and DARRON. Seen in Ortho Clinic in 2018 for R knee pain. Bp at goal today. Currently taking Losartan 25 mg po daily. Denies ADR. No refills needed at this time. FLP needed. LDL 78 (2017). Currently taking Atorvastatin 40 mg po daily. Tolerating well. Following Renal Clinic for CKD Stg III. Last OV 2018. Renal indices stable. COPD managed with Advair daily and Duonebs/ProAir inhaler as needed. She reports that she was hospitalized over the past year with pneumonia. She was given Spiriva in addition to her current medications. She does not take the Spiriva every day, only "when I feel I need it." Occasional cough with clear sputum. This is pt's norm. Reports taking Mucinex as needed and using spirometer as previously instructed. Hx of DARRON. Does not use CPAP d/t inability to tolerate. Sleeps with O2 qHS and reports awakening feeling "good and energized." Pt did express that she wanted to mention some forgetfulness lately. Admits occasionally beginning a task and forgetting to complete task. Has pulled out medication before but forgot to take it. Overall, she states, "I'm not concerned about it. I understand that I'm getting older. I just " "thought I would mention it so that we can monitor it." She is also concerned about a dry, erythemic patch to left side of nasal bridge that has been present for many years. She is concerned because she reports heavy sun exposure in younger years with inadequate sun protection. She would like region "checked out." Overall, pt reports that she feels "good." No longer a tobacco user. Uses tobacco creams/ointments PRN for psoriasis flares. States flares are rare. Denies fever, chills, HA, CP, SOB, Abd pain, Dysuria, B/B dysfunction, or any other concerns today.    5/8/19: 68 y.o.  female with PMHx significant for HTN, HLD, CKD Stg III, Solitary Kidney (kidney donor), COPD and DARRON, presenting for routine f/u. Bp at goal today. Currently taking Losartan 25 mg po daily. Renal indices stable. HgA1c 6.0%. . Pt recently had top teeth removed and dentures placed. States that she ate 1 cup of ice cream for an entire week. Pt believes this to be cause of elevated LDL. Plans to resume previous low-fat diet. Taking Atorvastatin 40 mg po daily. Tolerating well. Following Renal Clinic for CKD Stg III. Renal indices have been stable. Seen by Renal this am. Scheduled to f/u in 6 months. Hx of gallstones. Denies any recent abd complaints. COPD managed with Advair daily and Duonebs/ProAir inhaler as needed. Using Spiriva infrequently. Requesting med refills. Previously prescribed Fioricet for migraines. Reports significant improvement in migraines. Very pleased with medication effectiveness. Overall, pt reports that she's been feeling "great." Denies fever, chills, HA, CP, SOB, productive cough, Abd pain, Dysuria, B/B dysfunction, or any other concerns today.    (11/8/19): Ms. Joiner is presenting for a 6-mth f/u. PMHx significant for HTN, HLD, CKD Stg III, Solitary Kidney (kidney donor), COPD and DARRON. She was seen in Renal Clinic 10/9/19. Renal indices stable. She will return to Renal clinic in 6 mths. Alk remains " slightly elevated. Hx of gallstones without any acute complaints. Bp at goal. HgA1c 5.9%. FLP WNL; LDL 58. COPD managed with Advair twice a day, Spiriva as needed, and DuoNebs/Albuterol inhaler PRN. Mammo 10/25/19 negative. Bone Density 6/2018 WNL. Denies fever, chills, HA, CP, SOB, productive cough at present, Abd pain, Dysuria, B/B dysfunction, or any other concerns today.    ED F/u 1/6/20: Ms. Joiner is presenting for an ED f/u. PMHx significant for HTN, HLD, CKD Stg III, Solitary Kidney (kidney donor), COPD and DARRON. She presented to ED 12/30/19 with c/o cough, chills, and generalized body aches x 3 days. She thought she had the flu. Flu swab was negative. She was diagnosed with PNA. CXR revealed: Small, patchy opacity in the left lower lung may relate to atelectasis or pneumonia. She was given prescriptions for codeine-guaifenesin 10mg-100mg/5mL 5mL q6h prn cough and Levaquin 750 mg po daily x 5 days. She's completed the abx. Still using the cough syrup stating that cough and SOB with exertion is about the same. She's also c/o nasal congestion, HAs, and sinus pressure. O2 sat is 94% on RA. This is baseline for pt. She is afebrile at present. No other problems stated.    (7/24/2020): 69 y.o.  female with a PMHx significant for HTN, HLD, CKD Stg III, Solitary Kidney (kidney donor), psoriasis, OA, PNA, COPD and DARRON, presenting for routine f/u. Bp at goal today. Currently taking Losartan 25 mg po daily. Denies ADR. . Currently taking Atorvastatin 40 mg po daily. Tolerating well. Following Renal Clinic for CKD Stg III. Last OV 7/16/20. Renal indices stable. COPD managed with Advair daily and Duonebs/ProAir inhaler as needed. Uses Spiriva as needed. She endorses occasional dyspnea and wheezing with moderate exertion that resolves with rest. She is using O2 via NC at night and occasionally during a nap when she feels fatigued. Today, pt is c/o left hip pain x 2-3 weeks, occurring mainly at night when she  "lies flat. No known injuries. Pain throbbing and aching in quality. Moderate to severe intensity. Provoked by lying flat. Minimal relief with changing positions. Pain interferes with restful sleep. Unable to take NSAIDs. Has been "taking a lot of Tylenol lately" with no relief. Has used Tramadol in the past with some relief. States she was given Tramadol during an ED visit for pain in the past since she can't take "anything else because of my kidneys." She's otherwise doing well. Has started a Weight Watcher's program x 1 week. Has lost 3 lbs. No other problems stated.    Health Maintenance:   Colon Ca Screening-FIT 12/1/19, negative   Breast Ca Screening-Mammo 10/25/19, negative   Lung Ca Screening-declines    (1/25/2021): 70 y.o.  female with a PMHx significant for HTN, HLD, CKD Stg III, Solitary Kidney (kidney donor), psoriasis, OA, PNA, COPD and DARRON, presenting for routine f/u. Bp at goal today. Currently taking Losartan 25 mg po daily. Renal indices stable. She f/u with Renal via telemedicine last week. COPD managed with Advair daily and Duonebs/ProAir inhaler as needed. Uses Spiriva as needed. She endorses occasional dyspnea and wheezing with moderate exertion that resolves with rest. She is using O2 via NC at night and occasionally during a nap when she feels fatigued. She does report an increase in cough and mucous production over the last week that she attributes to the changing climate. She denies any abnl color to mucous, fever, chills, or worsening dyspnea. States sx improved with spirometer and CATE. On CPAP for DARRON. FLP WNL. Taking Atorvastatin and tolerating well. Previously referred to Sx clinic for gallstones. No appt today. C/o epigastric burning. Trying to avoid triggering foods. She had an eye exam at an outside clinic and was diagnosed with cataracts. States she'd noticed a change in vision, trouble seeing print close up, and cloudiness for a while. Needs a referral to ophthalmology. " "She is amenable to receiving the Shingrix vaccine. She denies any B/B complaints or falls. States feeling well. No other concerns.  Health Maintenance:   Colon Ca Screening-FIT 12/1/19, negative   Breast Ca Screening-Mammo 10/2020, negative   Lung Ca Screening-declines   Bone Density 10/2020, normal BMD    (7/26/2021): 70 y.o.  female with a PMHx significant for HTN, HLD, CKD Stg III, Solitary Kidney (kidney donor), psoriasis, OA, PNA, COPD and DARRON, presenting for routine f/u. Bp at goal today. Currently taking Losartan 25 mg po daily. Renal indices stable. No microalb on urine test. TSH WNL. She continues to follow renal clinic. COPD managed with Advair daily and Duonebs/ProAir inhaler as needed. Uses Spiriva as needed. She endorses occasional dyspnea and wheezing with moderate exertion that resolves with rest. She is using O2 via NC at night and occasionally during a nap when she feels fatigued. No changes from baseline. She would like a refill on benzonatate for PRN use cough. She's been evaluated in Sx clinic of gallstones. Due to higher surgical candidate risk given comorbidities, it was decided that pt would opt out of any unnecessary surgical procedures. Admits epigastric discomfort improves with omeprazole PRN. Screening mammo due 10/2021. Pt scheduled for cataract sx next month. She is requesting medication refills. No acute concerns.    (1/27/2022): 71 y.o.  female with a PMHx significant for HTN, HLD, CKD Stg III, Solitary Kidney (kidney donor), psoriasis, OA, PNA, COPD and DARORN, presenting for routine f/u. Bp at goal today. Currently taking Losartan 25 mg po daily. Renal indices stable. No microalb on urine test. She did see Renal Clinic 1/24/22. Noted stable with RTC 6 mths. Other labs reviewed and stable compared to baseline. Patient presented to ED on 1/7/22 with c/o left knee pain. Patient states her dogs hit the back of her left leg about three weeks ago and it "popped." Endorses " "pain to lateral aspect of knee and swelling. Has tried Tylenol for pain with no relief. Certain positions makes pain worse, movement makes pain better by decreasing "soreness and stiffness." In ER, XR of knee negative; prescribed prednisone and Voltaren. Appreciates a referral to Ortho. Denies falls. Reports "my lungs are doing well." She uses her spirometer regularly. Maintained with Advair and Devin PRN. No other concerns at this time.     Mammogram 11/2021 negative.    7/25/22: Patient presenting for routine f/u. VSS. Pt went to lab last week but only an A1c was drawn despite having labs orders in for her Renal appt today. A1c was WNL at 5.5. She reports that she's been feeling well. No new or worsening baseline dyspnea or chest pain.  has been Ortho for OA to knees. S/p CSI which helped. Admits working on weight loss to improve knee stability and pain. Requesting refills on Advair and Benzonatate (states uses PRN cough). No recent COPD exacerbations. Due for FIT this month, DXA in October, and mammogram in November. She is amenable to all screenings. No other concerns.    1/25/23: Patient presenting for routine f/u. She contacted clinic about one week ago reporting Bp was above goal (150s-160s/80s-90s). She was taking Losartan 25 mg po daily. She was instructed to increase Losartan to 50 mg po daily. States Bp has improved since doing so. Bp is at goal today. She is endorsing increased dyspnea (trouble ambulating 100ft or greater) and fatigue. Admits checking o2 saturation at home and noting numbers in the low 90s. States feels better when saturation is in the mid-90s. Admits having oxygen at home but primarily uses qHS due to fear of dependency. She does not endorse chest pain or increased LE edema. She does have a h/o DARRON but is not under treatment due to inability to tolerate face mask. She recently completed labs that were significant for an abnl FLP (worsened from previous). Admits following a low-fat " "diet and taking Atorvastatin 40 mg as prescribed. Other labs stable compared to baseline. She had a normal mammogram and DXA in November. She is amenable to pneumonia vaccine per recs. She has no other concerns.     4/25/23: Patient presenting for 3-month f/u. She was started on Trelegy at last visit. States work "great." She was able to get prescription assistance for the medication via the 's website. Completed PFTs and an Echo 2/28/23;    PFTs:   Nonspecific decrease in flow rates with no measured obstruction or restriction    Echo:  · The left ventricle is normal in size with normal systolic function.  · The estimated ejection fraction is 65%.  · Normal left ventricular diastolic function.  · Normal right ventricular size with normal right ventricular systolic function.  · Normal central venous pressure (3 mmHg).  · The estimated PA systolic pressure is 13 mmHg.  · IVC is normal.  · There is no pulmonary hypertension.  · Mild left atrial enlargement.    At last visit, she was to continue Losartan 50 mg po daily (previously changed). Doing well with BP WNL. And, she was started on Crestor for better HLD control. FLP improved significantly. LDL 70s as compared to 150s at last visit. Tolerating well.    She is c/o exacerbation of COPD sx.    Cough  This is a new problem. The current episode started 1 to 4 weeks ago. The problem has been waxing and waning. The problem occurs every few minutes. The cough is Productive of sputum. Associated symptoms include headaches, nasal congestion and wheezing. Pertinent negatives include no chest pain, chills, ear congestion, ear pain, fever, heartburn, hemoptysis, myalgias, postnasal drip, rash, rhinorrhea, sore throat, shortness of breath, sweats or weight loss. The symptoms are aggravated by cold air and pollens. She has tried a beta-agonist inhaler and prescription cough suppressant for the symptoms. The treatment provided mild relief. Her past medical history is " significant for COPD.     Review of Systems     Review of Systems   Constitutional:  Positive for fatigue. Negative for chills, fever and weight loss.   HENT: Negative.  Negative for ear pain, postnasal drip, rhinorrhea and sore throat.    Eyes: Negative.    Respiratory:  Positive for cough and wheezing. Negative for hemoptysis and shortness of breath.    Cardiovascular:  Negative for chest pain.   Gastrointestinal: Negative.  Negative for heartburn.   Endocrine: Negative.    Genitourinary: Negative.    Musculoskeletal: Negative.  Negative for myalgias.   Skin: Negative.  Negative for rash.   Allergic/Immunologic: Negative.    Neurological:  Positive for headaches.   Hematological: Negative.    Psychiatric/Behavioral: Negative.     All other systems reviewed and are negative.    Medical / Social / Family History     Past Medical History:   Diagnosis Date    Anxiety disorder, unspecified     CKD (chronic kidney disease)     COPD (chronic obstructive pulmonary disease)     Dyslipidemia     History of radical nephrectomy     HTN (hypertension)     DARRON (obstructive sleep apnea)        Past Surgical History:   Procedure Laterality Date    bilateral oophrectomy      CATARACT EXTRACTION      CATARACT EXTRACTION W/ INTRAOCULAR LENS IMPLANT Right 08/05/2021    CATARACT EXTRACTION W/ INTRAOCULAR LENS IMPLANT Left 07/13/2021    EYE SURGERY  08/2021    cataract    HYSTERECTOMY  1997       Social History  Ms.  reports that she quit smoking about 6 years ago. Her smoking use included cigarettes. She has been exposed to tobacco smoke. She has never used smokeless tobacco. She reports that she does not currently use alcohol. She reports that she does not use drugs.    Family History  Ms.'s family history includes Cancer in her mother; Heart attack in her father; Heart failure in her father.    Medications and Allergies     Medications  Medication List with Changes/Refills   New Medications    AZITHROMYCIN (Z-GRISELDA) 250 MG TABLET     Take 2 tablets by mouth on day 1; Take 1 tablet by mouth on days 2-5    PREDNISONE (DELTASONE) 10 MG TABLET    Take 1 tablet (10 mg total) by mouth once daily. for 5 days   Current Medications    ALBUTEROL (PROVENTIL/VENTOLIN HFA) 90 MCG/ACTUATION INHALER    Inhale 1 puff into the lungs every 6 (six) hours as needed.    CALCIUM CARBONATE (OS-GINGER) 500 MG CALCIUM (1,250 MG) TABLET    Take 1 tablet by mouth 2 (two) times daily.    CETIRIZINE (ZYRTEC) 10 MG TABLET    Take 10 mg by mouth Daily.    DICLOFENAC SODIUM (VOLTAREN) 1 % GEL    Apply topically 4 (four) times daily as needed.    FLUTICASONE-UMECLIDIN-VILANTER (TRELEGY ELLIPTA) 100-62.5-25 MCG DSDV    Inhale 1 puff into the lungs once daily.    LOSARTAN (COZAAR) 50 MG TABLET    Take 1 tablet (50 mg total) by mouth once daily.    ROSUVASTATIN (CRESTOR) 40 MG TAB    Take 1 tablet (40 mg total) by mouth every evening.   Changed and/or Refilled Medications    Modified Medication Previous Medication    ALBUTEROL-IPRATROPIUM (DUO-NEB) 2.5 MG-0.5 MG/3 ML NEBULIZER SOLUTION albuterol-ipratropium (DUO-NEB) 2.5 mg-0.5 mg/3 mL nebulizer solution       Take 3 mLs by nebulization 4 (four) times daily as needed for Shortness of Breath or Wheezing.    Inhale 3 mLs into the lungs 4 (four) times daily as needed.    BENZONATATE (TESSALON) 100 MG CAPSULE benzonatate (TESSALON) 100 MG capsule       Take 1 capsule (100 mg total) by mouth 3 (three) times daily as needed for Cough.    Take 1 capsule (100 mg total) by mouth 3 (three) times daily as needed for Cough.    OMEPRAZOLE (PRILOSEC) 20 MG CAPSULE omeprazole (PRILOSEC) 20 MG capsule       Take 1 capsule (20 mg total) by mouth once daily.    Take 1 capsule (20 mg total) by mouth once daily.   Discontinued Medications    ADVAIR DISKUS 100-50 MCG/DOSE DISKUS INHALER    Inhale 1 puff into the lungs 2 (two) times daily.       Allergies  Review of patient's allergies indicates:  No Known Allergies    Physical Examination     Vitals:     04/25/23 0751   BP: 131/82   Pulse: 74   Resp: 20   Temp: 97.9 °F (36.6 °C)     Physical Exam  Constitutional:       Appearance: Normal appearance. She is obese.   HENT:      Head: Normocephalic and atraumatic.      Right Ear: External ear normal.      Left Ear: External ear normal.      Mouth/Throat:      Mouth: Mucous membranes are moist.   Eyes:      Extraocular Movements: Extraocular movements intact.      Conjunctiva/sclera: Conjunctivae normal.      Pupils: Pupils are equal, round, and reactive to light.   Cardiovascular:      Rate and Rhythm: Regular rhythm.      Pulses: Normal pulses.      Heart sounds: Normal heart sounds.   Pulmonary:      Effort: Pulmonary effort is normal. No respiratory distress.      Breath sounds: No stridor. Wheezing present. No rhonchi.   Chest:      Chest wall: No tenderness.   Abdominal:      General: Bowel sounds are normal.      Palpations: Abdomen is soft.   Musculoskeletal:         General: Normal range of motion.      Cervical back: Normal range of motion.      Right lower leg: No edema.      Left lower leg: No edema.   Skin:     General: Skin is warm and dry.   Neurological:      General: No focal deficit present.      Mental Status: She is alert and oriented to person, place, and time.   Psychiatric:         Mood and Affect: Mood normal.         Behavior: Behavior normal.         Thought Content: Thought content normal.         Judgment: Judgment normal.         Results     Lab Results   Component Value Date    WBC 6.8 01/24/2023    RBC 4.40 01/24/2023    HGB 14.5 01/24/2023    HCT 44.5 01/24/2023    .1 (H) 01/24/2023    MCH 33.0 01/24/2023    MCHC 32.6 (L) 01/24/2023    RDW 12.0 01/24/2023     01/24/2023    MPV 10.1 01/24/2023     CMP  Sodium Level   Date Value Ref Range Status   04/24/2023 144 136 - 145 mmol/L Final     Potassium Level   Date Value Ref Range Status   04/24/2023 4.6 3.5 - 5.1 mmol/L Final     Carbon Dioxide   Date Value Ref Range Status    04/24/2023 27 23 - 31 mmol/L Final     Blood Urea Nitrogen   Date Value Ref Range Status   04/24/2023 22.1 (H) 9.8 - 20.1 mg/dL Final     Creatinine   Date Value Ref Range Status   04/24/2023 1.38 (H) 0.55 - 1.02 mg/dL Final     Calcium Level Total   Date Value Ref Range Status   04/24/2023 9.7 8.4 - 10.2 mg/dL Final     Albumin Level   Date Value Ref Range Status   04/24/2023 4.1 3.4 - 4.8 g/dL Final     Bilirubin Total   Date Value Ref Range Status   04/24/2023 0.6 <=1.5 mg/dL Final     Alkaline Phosphatase   Date Value Ref Range Status   04/24/2023 121 40 - 150 unit/L Final     Aspartate Aminotransferase   Date Value Ref Range Status   04/24/2023 27 5 - 34 unit/L Final     Alanine Aminotransferase   Date Value Ref Range Status   04/24/2023 24 0 - 55 unit/L Final     Estimated GFR-Non    Date Value Ref Range Status   07/25/2022 37 mls/min/1.73/m2 Final     Lab Results   Component Value Date    CHOL 142 04/24/2023     Lab Results   Component Value Date    HDL 52 04/24/2023     No results found for: LDLCALC  Lab Results   Component Value Date    TRIG 89 04/24/2023     No results found for: CHOLHDL  Lab Results   Component Value Date    TSH 1.922 01/24/2023     Lab Results   Component Value Date    PHUR 5.5 11/08/2018    PROTEINUA Trace (A) 01/24/2023    GLUCUA Normal 11/08/2018    KETONESU Negative 11/08/2018    OCCULTUA Negative 11/08/2018    NITRITE Negative 11/08/2018    LEUKOCYTESUR Negative 01/24/2023           Assessment and Plan (including Health Maintenance)     Plan:         Health Maintenance Due   Topic Date Due    Colorectal Cancer Screening  Never done    COVID-19 Vaccine (3 - Booster for Pfizer series) 05/07/2021       Problem List Items Addressed This Visit          Pulmonary    Chronic obstructive pulmonary disease    Overview     PFTs 2/28/23: Nonspecific decrease in flow rates with no measured obstruction or restriction           Current Assessment & Plan     Doing much better on  Trelegy. Continue.  Continue Devin PRN  Will tx acute exacerbation w/ Z-pack and short course of Prednisone  O2 sat 94% on RA at this time  ED precautions for worsening sx or decreased saturation < 90%           Relevant Medications    albuterol-ipratropium (DUO-NEB) 2.5 mg-0.5 mg/3 mL nebulizer solution    benzonatate (TESSALON) 100 MG capsule       Cardiac/Vascular    Hypertension    Overview     Losartan 50 mg po daily           Current Assessment & Plan     At goal  Continue current medication  DASH              Other    Obstructive sleep apnea syndrome    Current Assessment & Plan     Awaiting CPAP           Wellness examination    Overview     Health Maintenance:   Colon Ca Screening-FIT 7/28/21, negative   Breast Ca Screening-Mammo 11/7/2022, negative   Lung Ca Screening-declines   Bone Density 11/7/2022, normal BMD             Current Assessment & Plan     FIT kit provided            Other Visit Diagnoses       COPD exacerbation    -  Primary    Relevant Medications    azithromycin (Z-GRISELDA) 250 MG tablet    predniSONE (DELTASONE) 10 MG tablet    albuterol-ipratropium (DUO-NEB) 2.5 mg-0.5 mg/3 mL nebulizer solution    Screening for colon cancer        Relevant Orders    OCCULT BLOOD FECAL IMMUNOASSAY              Health Maintenance Topics with due status: Not Due       Topic Last Completion Date    TETANUS VACCINE 01/05/2017    Hemoglobin A1c (Prediabetes) 07/21/2022    Mammogram 11/07/2022    DEXA Scan 11/07/2022    Lipid Panel 04/24/2023       Future Appointments   Date Time Provider Department Center   7/25/2023  7:30 AM BIRGIT Stafford [facility]   7/31/2023  9:15 AM BIRGIT Licea [facility]   11/2/2023  7:45 AM [facility]     Labs per renal prior to f/u    Signature:  BIRGIT Stafford  [facility]  [address] [facility] [address]    Date of encounter:  4/25/23

## 2023-04-25 NOTE — ASSESSMENT & PLAN NOTE
Doing much better on Trelegy. Continue.  Continue Devin PRN  Will tx acute exacerbation w/ Z-pack and short course of Prednisone  O2 sat 94% on RA at this time  ED precautions for worsening sx or decreased saturation < 90%

## 2023-04-28 ENCOUNTER — HOSPITAL ENCOUNTER (OUTPATIENT)
Facility: HOSPITAL | Age: 73
Discharge: HOME | End: 2023-04-29
Attending: STUDENT IN AN ORGANIZED HEALTH CARE EDUCATION/TRAINING PROGRAM | Admitting: INTERNAL MEDICINE

## 2023-04-28 DIAGNOSIS — I20.0 UNSTABLE ANGINA: ICD-10-CM

## 2023-04-28 DIAGNOSIS — R07.9 CHEST PAIN: Primary | ICD-10-CM

## 2023-04-28 DIAGNOSIS — I10 PRIMARY HYPERTENSION: ICD-10-CM

## 2023-04-28 LAB
ALBUMIN SERPL-MCNC: 4.5 G/DL (ref 3.4–4.8)
ALBUMIN/GLOB SERPL: 1.5 RATIO (ref 1.1–2)
ALP SERPL-CCNC: 139 UNIT/L (ref 40–150)
ALT SERPL-CCNC: 29 UNIT/L (ref 0–55)
AST SERPL-CCNC: 49 UNIT/L (ref 5–34)
BASOPHILS # BLD AUTO: 0.05 X10(3)/MCL (ref 0–0.2)
BASOPHILS NFR BLD AUTO: 0.5 %
BILIRUBIN DIRECT+TOT PNL SERPL-MCNC: 0.7 MG/DL
BNP BLD-MCNC: 44.3 PG/ML
BUN SERPL-MCNC: 25.8 MG/DL (ref 9.8–20.1)
CALCIUM SERPL-MCNC: 9.8 MG/DL (ref 8.4–10.2)
CHLORIDE SERPL-SCNC: 109 MMOL/L (ref 98–107)
CK MB SERPL-MCNC: 2.3 NG/ML
CK SERPL-CCNC: 246 U/L (ref 29–168)
CO2 SERPL-SCNC: 25 MMOL/L (ref 23–31)
CREAT SERPL-MCNC: 1.4 MG/DL (ref 0.55–1.02)
EOSINOPHIL # BLD AUTO: 0.13 X10(3)/MCL (ref 0–0.9)
EOSINOPHIL NFR BLD AUTO: 1.3 %
ERYTHROCYTE [DISTWIDTH] IN BLOOD BY AUTOMATED COUNT: 12.5 % (ref 11.5–17)
GFR SERPLBLD CREATININE-BSD FMLA CKD-EPI: 41 MLS/MIN/1.73/M2
GLOBULIN SER-MCNC: 3.1 GM/DL (ref 2.4–3.5)
GLUCOSE SERPL-MCNC: 96 MG/DL (ref 82–115)
HCT VFR BLD AUTO: 43.9 % (ref 37–47)
HGB BLD-MCNC: 14.4 G/DL (ref 12–16)
IMM GRANULOCYTES # BLD AUTO: 0.04 X10(3)/MCL (ref 0–0.04)
IMM GRANULOCYTES NFR BLD AUTO: 0.4 %
LYMPHOCYTES # BLD AUTO: 2.39 X10(3)/MCL (ref 0.6–4.6)
LYMPHOCYTES NFR BLD AUTO: 23.9 %
MCH RBC QN AUTO: 32.3 PG (ref 27–31)
MCHC RBC AUTO-ENTMCNC: 32.8 G/DL (ref 33–36)
MCV RBC AUTO: 98.4 FL (ref 80–94)
MONOCYTES # BLD AUTO: 0.89 X10(3)/MCL (ref 0.1–1.3)
MONOCYTES NFR BLD AUTO: 8.9 %
NEUTROPHILS # BLD AUTO: 6.52 X10(3)/MCL (ref 2.1–9.2)
NEUTROPHILS NFR BLD AUTO: 65 %
NRBC BLD AUTO-RTO: 0 %
PLATELET # BLD AUTO: 240 X10(3)/MCL (ref 130–400)
PMV BLD AUTO: 10.2 FL (ref 7.4–10.4)
POTASSIUM SERPL-SCNC: 4.4 MMOL/L (ref 3.5–5.1)
PROT SERPL-MCNC: 7.6 GM/DL (ref 5.8–7.6)
RBC # BLD AUTO: 4.46 X10(6)/MCL (ref 4.2–5.4)
SODIUM SERPL-SCNC: 144 MMOL/L (ref 136–145)
TROPONIN I SERPL-MCNC: <0.01 NG/ML (ref 0–0.04)
WBC # SPEC AUTO: 10 X10(3)/MCL (ref 4.5–11.5)

## 2023-04-28 PROCEDURE — 99285 EMERGENCY DEPT VISIT HI MDM: CPT | Mod: 25

## 2023-04-28 PROCEDURE — 85025 COMPLETE CBC W/AUTO DIFF WBC: CPT | Performed by: STUDENT IN AN ORGANIZED HEALTH CARE EDUCATION/TRAINING PROGRAM

## 2023-04-28 PROCEDURE — 96374 THER/PROPH/DIAG INJ IV PUSH: CPT

## 2023-04-28 PROCEDURE — 63600175 PHARM REV CODE 636 W HCPCS

## 2023-04-28 PROCEDURE — 25000242 PHARM REV CODE 250 ALT 637 W/ HCPCS: Performed by: STUDENT IN AN ORGANIZED HEALTH CARE EDUCATION/TRAINING PROGRAM

## 2023-04-28 PROCEDURE — 25000003 PHARM REV CODE 250: Performed by: STUDENT IN AN ORGANIZED HEALTH CARE EDUCATION/TRAINING PROGRAM

## 2023-04-28 PROCEDURE — 80053 COMPREHEN METABOLIC PANEL: CPT | Performed by: STUDENT IN AN ORGANIZED HEALTH CARE EDUCATION/TRAINING PROGRAM

## 2023-04-28 PROCEDURE — 84484 ASSAY OF TROPONIN QUANT: CPT | Performed by: STUDENT IN AN ORGANIZED HEALTH CARE EDUCATION/TRAINING PROGRAM

## 2023-04-28 PROCEDURE — G0378 HOSPITAL OBSERVATION PER HR: HCPCS

## 2023-04-28 PROCEDURE — 82553 CREATINE MB FRACTION: CPT

## 2023-04-28 PROCEDURE — C9113 INJ PANTOPRAZOLE SODIUM, VIA: HCPCS

## 2023-04-28 PROCEDURE — 93005 ELECTROCARDIOGRAM TRACING: CPT

## 2023-04-28 PROCEDURE — 96372 THER/PROPH/DIAG INJ SC/IM: CPT | Mod: 59

## 2023-04-28 PROCEDURE — 96361 HYDRATE IV INFUSION ADD-ON: CPT

## 2023-04-28 PROCEDURE — 83880 ASSAY OF NATRIURETIC PEPTIDE: CPT | Performed by: STUDENT IN AN ORGANIZED HEALTH CARE EDUCATION/TRAINING PROGRAM

## 2023-04-28 PROCEDURE — 82550 ASSAY OF CK (CPK): CPT

## 2023-04-28 RX ORDER — ASPIRIN 325 MG
325 TABLET ORAL
Status: COMPLETED | OUTPATIENT
Start: 2023-04-28 | End: 2023-04-28

## 2023-04-28 RX ORDER — LOSARTAN POTASSIUM 25 MG/1
50 TABLET ORAL DAILY
Status: DISCONTINUED | OUTPATIENT
Start: 2023-04-29 | End: 2023-04-29

## 2023-04-28 RX ORDER — PANTOPRAZOLE SODIUM 40 MG/10ML
40 INJECTION, POWDER, LYOPHILIZED, FOR SOLUTION INTRAVENOUS ONCE
Status: COMPLETED | OUTPATIENT
Start: 2023-04-28 | End: 2023-04-28

## 2023-04-28 RX ORDER — BENZONATATE 100 MG/1
100 CAPSULE ORAL 3 TIMES DAILY PRN
Status: DISCONTINUED | OUTPATIENT
Start: 2023-04-28 | End: 2023-04-29 | Stop reason: HOSPADM

## 2023-04-28 RX ORDER — SODIUM CHLORIDE, SODIUM LACTATE, POTASSIUM CHLORIDE, CALCIUM CHLORIDE 600; 310; 30; 20 MG/100ML; MG/100ML; MG/100ML; MG/100ML
INJECTION, SOLUTION INTRAVENOUS CONTINUOUS
Status: DISCONTINUED | OUTPATIENT
Start: 2023-04-28 | End: 2023-04-29 | Stop reason: HOSPADM

## 2023-04-28 RX ORDER — NITROGLYCERIN 0.4 MG/1
0.4 TABLET SUBLINGUAL EVERY 5 MIN PRN
Status: DISCONTINUED | OUTPATIENT
Start: 2023-04-28 | End: 2023-04-29 | Stop reason: HOSPADM

## 2023-04-28 RX ORDER — ENOXAPARIN SODIUM 100 MG/ML
40 INJECTION SUBCUTANEOUS EVERY 24 HOURS
Status: DISCONTINUED | OUTPATIENT
Start: 2023-04-28 | End: 2023-04-29 | Stop reason: HOSPADM

## 2023-04-28 RX ORDER — TALC
6 POWDER (GRAM) TOPICAL NIGHTLY PRN
Status: DISCONTINUED | OUTPATIENT
Start: 2023-04-28 | End: 2023-04-29 | Stop reason: HOSPADM

## 2023-04-28 RX ORDER — ATORVASTATIN CALCIUM 40 MG/1
80 TABLET, FILM COATED ORAL DAILY
Status: DISCONTINUED | OUTPATIENT
Start: 2023-04-29 | End: 2023-04-29 | Stop reason: HOSPADM

## 2023-04-28 RX ORDER — SODIUM CHLORIDE 0.9 % (FLUSH) 0.9 %
10 SYRINGE (ML) INJECTION
Status: DISCONTINUED | OUTPATIENT
Start: 2023-04-28 | End: 2023-04-29 | Stop reason: HOSPADM

## 2023-04-28 RX ADMIN — ENOXAPARIN SODIUM 40 MG: 40 INJECTION SUBCUTANEOUS at 09:04

## 2023-04-28 RX ADMIN — ASPIRIN 325 MG ORAL TABLET 325 MG: 325 PILL ORAL at 07:04

## 2023-04-28 RX ADMIN — NITROGLYCERIN 0.4 MG: 0.4 TABLET SUBLINGUAL at 07:04

## 2023-04-28 RX ADMIN — PANTOPRAZOLE SODIUM 40 MG: 40 INJECTION, POWDER, FOR SOLUTION INTRAVENOUS at 09:04

## 2023-04-28 RX ADMIN — NITROGLYCERIN 0.4 MG: 0.4 TABLET SUBLINGUAL at 08:04

## 2023-04-28 RX ADMIN — SODIUM CHLORIDE, POTASSIUM CHLORIDE, SODIUM LACTATE AND CALCIUM CHLORIDE: 600; 310; 30; 20 INJECTION, SOLUTION INTRAVENOUS at 09:04

## 2023-04-29 VITALS
HEART RATE: 75 BPM | HEIGHT: 65 IN | WEIGHT: 220 LBS | TEMPERATURE: 98 F | BODY MASS INDEX: 36.65 KG/M2 | RESPIRATION RATE: 17 BRPM | SYSTOLIC BLOOD PRESSURE: 168 MMHG | DIASTOLIC BLOOD PRESSURE: 103 MMHG | OXYGEN SATURATION: 93 %

## 2023-04-29 PROBLEM — I16.0 HYPERTENSIVE URGENCY: Status: ACTIVE | Noted: 2023-04-29

## 2023-04-29 LAB
ALBUMIN SERPL-MCNC: 4 G/DL (ref 3.4–4.8)
ALBUMIN/GLOB SERPL: 1.4 RATIO (ref 1.1–2)
ALP SERPL-CCNC: 134 UNIT/L (ref 40–150)
ALT SERPL-CCNC: 77 UNIT/L (ref 0–55)
AST SERPL-CCNC: 110 UNIT/L (ref 5–34)
BASOPHILS # BLD AUTO: 0.05 X10(3)/MCL (ref 0–0.2)
BASOPHILS NFR BLD AUTO: 0.5 %
BILIRUBIN DIRECT+TOT PNL SERPL-MCNC: 0.5 MG/DL
BUN SERPL-MCNC: 25.9 MG/DL (ref 9.8–20.1)
CALCIUM SERPL-MCNC: 9 MG/DL (ref 8.4–10.2)
CHLORIDE SERPL-SCNC: 113 MMOL/L (ref 98–107)
CO2 SERPL-SCNC: 20 MMOL/L (ref 23–31)
CREAT SERPL-MCNC: 1.28 MG/DL (ref 0.55–1.02)
EOSINOPHIL # BLD AUTO: 0.28 X10(3)/MCL (ref 0–0.9)
EOSINOPHIL NFR BLD AUTO: 2.9 %
ERYTHROCYTE [DISTWIDTH] IN BLOOD BY AUTOMATED COUNT: 12.5 % (ref 11.5–17)
GFR SERPLBLD CREATININE-BSD FMLA CKD-EPI: 45 MLS/MIN/1.73/M2
GLOBULIN SER-MCNC: 2.8 GM/DL (ref 2.4–3.5)
GLUCOSE SERPL-MCNC: 90 MG/DL (ref 82–115)
HCT VFR BLD AUTO: 41.8 % (ref 37–47)
HGB BLD-MCNC: 13.6 G/DL (ref 12–16)
IMM GRANULOCYTES # BLD AUTO: 0.04 X10(3)/MCL (ref 0–0.04)
IMM GRANULOCYTES NFR BLD AUTO: 0.4 %
LYMPHOCYTES # BLD AUTO: 2.52 X10(3)/MCL (ref 0.6–4.6)
LYMPHOCYTES NFR BLD AUTO: 25.8 %
MCH RBC QN AUTO: 32.4 PG (ref 27–31)
MCHC RBC AUTO-ENTMCNC: 32.5 G/DL (ref 33–36)
MCV RBC AUTO: 99.5 FL (ref 80–94)
MONOCYTES # BLD AUTO: 0.86 X10(3)/MCL (ref 0.1–1.3)
MONOCYTES NFR BLD AUTO: 8.8 %
NEUTROPHILS # BLD AUTO: 6.02 X10(3)/MCL (ref 2.1–9.2)
NEUTROPHILS NFR BLD AUTO: 61.6 %
NRBC BLD AUTO-RTO: 0 %
PLATELET # BLD AUTO: 215 X10(3)/MCL (ref 130–400)
PMV BLD AUTO: 10.5 FL (ref 7.4–10.4)
POTASSIUM SERPL-SCNC: 4.1 MMOL/L (ref 3.5–5.1)
PROT SERPL-MCNC: 6.8 GM/DL (ref 5.8–7.6)
RBC # BLD AUTO: 4.2 X10(6)/MCL (ref 4.2–5.4)
SODIUM SERPL-SCNC: 143 MMOL/L (ref 136–145)
TROPONIN I SERPL-MCNC: <0.01 NG/ML (ref 0–0.04)
TROPONIN I SERPL-MCNC: <0.01 NG/ML (ref 0–0.04)
WBC # SPEC AUTO: 9.8 X10(3)/MCL (ref 4.5–11.5)

## 2023-04-29 PROCEDURE — 27000221 HC OXYGEN, UP TO 24 HOURS

## 2023-04-29 PROCEDURE — 96361 HYDRATE IV INFUSION ADD-ON: CPT

## 2023-04-29 PROCEDURE — 25000003 PHARM REV CODE 250

## 2023-04-29 PROCEDURE — 96375 TX/PRO/DX INJ NEW DRUG ADDON: CPT

## 2023-04-29 PROCEDURE — 94761 N-INVAS EAR/PLS OXIMETRY MLT: CPT

## 2023-04-29 PROCEDURE — 85025 COMPLETE CBC W/AUTO DIFF WBC: CPT | Performed by: INTERNAL MEDICINE

## 2023-04-29 PROCEDURE — 93005 ELECTROCARDIOGRAM TRACING: CPT

## 2023-04-29 PROCEDURE — 84484 ASSAY OF TROPONIN QUANT: CPT

## 2023-04-29 PROCEDURE — 96376 TX/PRO/DX INJ SAME DRUG ADON: CPT

## 2023-04-29 PROCEDURE — 63600175 PHARM REV CODE 636 W HCPCS

## 2023-04-29 PROCEDURE — 63600175 PHARM REV CODE 636 W HCPCS: Performed by: STUDENT IN AN ORGANIZED HEALTH CARE EDUCATION/TRAINING PROGRAM

## 2023-04-29 PROCEDURE — G0378 HOSPITAL OBSERVATION PER HR: HCPCS

## 2023-04-29 PROCEDURE — 80053 COMPREHEN METABOLIC PANEL: CPT | Performed by: INTERNAL MEDICINE

## 2023-04-29 RX ORDER — LOSARTAN POTASSIUM 50 MG/1
50 TABLET ORAL DAILY
Qty: 90 TABLET | Refills: 1 | Status: SHIPPED | OUTPATIENT
Start: 2023-04-29 | End: 2023-11-28 | Stop reason: SDUPTHER

## 2023-04-29 RX ORDER — AMLODIPINE BESYLATE 5 MG/1
5 TABLET ORAL DAILY
Qty: 90 TABLET | Refills: 3 | Status: SHIPPED | OUTPATIENT
Start: 2023-04-30 | End: 2023-04-29 | Stop reason: SDUPTHER

## 2023-04-29 RX ORDER — AMLODIPINE BESYLATE 5 MG/1
10 TABLET ORAL DAILY
Qty: 90 TABLET | Refills: 3 | Status: SHIPPED | OUTPATIENT
Start: 2023-04-30 | End: 2023-07-13

## 2023-04-29 RX ORDER — LOSARTAN POTASSIUM 25 MG/1
50 TABLET ORAL DAILY
Status: DISCONTINUED | OUTPATIENT
Start: 2023-04-29 | End: 2023-04-29 | Stop reason: HOSPADM

## 2023-04-29 RX ORDER — HYDRALAZINE HYDROCHLORIDE 20 MG/ML
5 INJECTION INTRAMUSCULAR; INTRAVENOUS ONCE
Status: COMPLETED | OUTPATIENT
Start: 2023-04-29 | End: 2023-04-29

## 2023-04-29 RX ORDER — AMLODIPINE BESYLATE 5 MG/1
5 TABLET ORAL DAILY
Status: DISCONTINUED | OUTPATIENT
Start: 2023-04-29 | End: 2023-04-29

## 2023-04-29 RX ORDER — AMLODIPINE BESYLATE 5 MG/1
5 TABLET ORAL DAILY
Status: DISCONTINUED | OUTPATIENT
Start: 2023-04-29 | End: 2023-04-29 | Stop reason: HOSPADM

## 2023-04-29 RX ORDER — AMLODIPINE BESYLATE 5 MG/1
5 TABLET ORAL DAILY
Status: CANCELLED | OUTPATIENT
Start: 2023-04-29

## 2023-04-29 RX ADMIN — HYDRALAZINE HYDROCHLORIDE 5 MG: 20 INJECTION INTRAMUSCULAR; INTRAVENOUS at 05:04

## 2023-04-29 RX ADMIN — HYDRALAZINE HYDROCHLORIDE 5 MG: 20 INJECTION INTRAMUSCULAR; INTRAVENOUS at 04:04

## 2023-04-29 RX ADMIN — SODIUM CHLORIDE, POTASSIUM CHLORIDE, SODIUM LACTATE AND CALCIUM CHLORIDE: 600; 310; 30; 20 INJECTION, SOLUTION INTRAVENOUS at 06:04

## 2023-04-29 RX ADMIN — AMLODIPINE BESYLATE 5 MG: 5 TABLET ORAL at 10:04

## 2023-04-29 RX ADMIN — ATORVASTATIN CALCIUM 80 MG: 40 TABLET, FILM COATED ORAL at 09:04

## 2023-04-29 RX ADMIN — LOSARTAN POTASSIUM 50 MG: 25 TABLET, FILM COATED ORAL at 07:04

## 2023-04-29 NOTE — ED PROVIDER NOTES
"Encounter Date: 2023       History     Chief Complaint   Patient presents with    Chest Pain     Pt c/o acute onset of midline chest pressure while watching tv prior to arrival, also c/o sob and nausea, denies vomiting/palpitations     72-year-old female presents to ED for chest pain.  Denies any cardiac history, reports COPD and has known asymptomatic gallstones.  States she was sitting down in her recliner 30 minutes after eating ice cream when she developed chest pressure.  States it felt like "a dog was sitting on my chest".  Endorses some mild radiation going to her back. no diaphoresis nausea vomiting.  Denies any extremity involvement, no neuro deficits, no recent fever chills or other complaints at this time.   bedside and agrees.  States initially the pain was a 10 however is now down to a 5 w/o intervention.    Review of patient's allergies indicates:  No Known Allergies  Past Medical History:   Diagnosis Date    Anxiety disorder, unspecified     CKD (chronic kidney disease)     COPD (chronic obstructive pulmonary disease)     Dyslipidemia     History of radical nephrectomy     HTN (hypertension)     DARRON (obstructive sleep apnea)      Past Surgical History:   Procedure Laterality Date    bilateral oophrectomy      CATARACT EXTRACTION      CATARACT EXTRACTION W/ INTRAOCULAR LENS IMPLANT Right 2021    CATARACT EXTRACTION W/ INTRAOCULAR LENS IMPLANT Left 2021    EYE SURGERY  2021    cataract    HYSTERECTOMY       Family History   Problem Relation Age of Onset    Cancer Mother     Heart failure Father     Heart attack Father      Social History     Tobacco Use    Smoking status: Former     Types: Cigarettes     Quit date: 2017     Years since quittin.3     Passive exposure: Current    Smokeless tobacco: Never   Substance Use Topics    Alcohol use: Not Currently    Drug use: Never     Review of Systems   Constitutional:  Negative for chills, diaphoresis and fever.   HENT:  " Negative for congestion, rhinorrhea, sinus pain and sore throat.    Eyes:  Negative for pain, discharge and itching.   Respiratory:  Negative for cough, chest tightness and shortness of breath.    Cardiovascular:  Positive for chest pain. Negative for palpitations.   Gastrointestinal:  Negative for abdominal pain, nausea and vomiting.   Genitourinary:  Negative for dysuria, flank pain and hematuria.   Musculoskeletal:  Negative for back pain and myalgias.   Skin:  Negative for color change and rash.   Neurological:  Negative for dizziness, weakness and headaches.   Psychiatric/Behavioral:  Negative for confusion. The patient is not hyperactive.      Physical Exam     Initial Vitals [04/28/23 1914]   BP Pulse Resp Temp SpO2   (!) 203/89 91 (!) 23 98.5 °F (36.9 °C) 97 %      MAP       --         Physical Exam    Vitals reviewed.  Constitutional: She appears well-developed and well-nourished. She is not diaphoretic. No distress.   HENT:   Head: Normocephalic and atraumatic.   Eyes: Conjunctivae and EOM are normal. Pupils are equal, round, and reactive to light.   Neck: Neck supple. No tracheal deviation present.   Normal range of motion.  Cardiovascular:  Normal rate, regular rhythm, normal heart sounds and intact distal pulses.           Equal +2 radial pulses bilaterally.   Pulmonary/Chest: Breath sounds normal. No respiratory distress.   Abdominal: Abdomen is soft. There is no abdominal tenderness. There is no rebound and no guarding.   Musculoskeletal:         General: Normal range of motion.      Cervical back: Normal range of motion and neck supple.     Neurological: She is alert and oriented to person, place, and time. She has normal strength. GCS score is 15. GCS eye subscore is 4. GCS verbal subscore is 5. GCS motor subscore is 6.   Skin: Skin is warm and dry. Capillary refill takes less than 2 seconds. No rash noted.   Psychiatric: She has a normal mood and affect. Her behavior is normal. Judgment and thought  content normal.       ED Course   Procedures  Labs Reviewed   COMPREHENSIVE METABOLIC PANEL - Abnormal; Notable for the following components:       Result Value    Chloride 109 (*)     Blood Urea Nitrogen 25.8 (*)     Creatinine 1.40 (*)     Aspartate Aminotransferase 49 (*)     All other components within normal limits   CBC WITH DIFFERENTIAL - Abnormal; Notable for the following components:    MCV 98.4 (*)     MCH 32.3 (*)     MCHC 32.8 (*)     All other components within normal limits   CK - Abnormal; Notable for the following components:    Creatine Kinase 246 (*)     All other components within normal limits   TROPONIN I - Normal   B-TYPE NATRIURETIC PEPTIDE - Normal   CK-MB - Normal   CBC W/ AUTO DIFFERENTIAL    Narrative:     The following orders were created for panel order CBC auto differential.  Procedure                               Abnormality         Status                     ---------                               -----------         ------                     CBC with Differential[[id number]]        Abnormal            Final result                 Please view results for these tests on the individual orders.   EXTRA TUBES    Narrative:     The following orders were created for panel order EXTRA TUBES.  Procedure                               Abnormality         Status                     ---------                               -----------         ------                     Light Blue Top Hold[[id number]]                              In process                 Red Top Hold[[id number]]                                     In process                   Please view results for these tests on the individual orders.   LIGHT BLUE TOP HOLD   RED TOP HOLD     EKG Readings: (Independently Interpreted)   Initial Reading: No STEMI. Rhythm: Normal Sinus Rhythm. Ectopy: No Ectopy. Conduction: Normal. Axis: Normal. Clinical Impression: Normal Sinus Rhythm   ECG Results              EKG 12-lead (Final result)  Result  time 04/29/23 18:06:58      Final result by Interface, Lab In [facility] (04/29/23 18:06:58)                   Narrative:    Test Reason : R07.9,    Vent. Rate : 082 BPM     Atrial Rate : 082 BPM     P-R Int : 202 ms          QRS Dur : 078 ms      QT Int : 356 ms       P-R-T Axes : 072 057 031 degrees     QTc Int : 415 ms    Normal sinus rhythm  Normal ECG  No previous ECGs available  Confirmed by Lane Ramirez MD ([id number]) on 4/29/2023 6:06:51 PM    Referred By: JOHN   SELF           Confirmed By:Lane Ramirez MD                                  Imaging Results              X-Ray Chest AP Portable (Final result)  Result time 04/28/23 20:08:18      Final result by Clem Martin MD (04/28/23 20:08:18)                   Impression:      NO ACUTE CARDIOPULMONARY PROCESS IDENTIFIED.      Electronically signed by: Clem Martin  Date:    04/28/2023  Time:    20:08               Narrative:    EXAMINATION:  XR CHEST AP PORTABLE    CLINICAL HISTORY:  Chest Pain;    TECHNIQUE:  One view    COMPARISON:  December 30, 2019..    FINDINGS:  Cardiopericardial silhouette is within normal limits.  Left lower lung lobe retrocardiac area is obscured on this portable radiograph.  Bibasilar hypoventilatory changes without definite dense focal or segmental consolidation, congestive process, pleural effusions or pneumothorax.                                       Medications   aspirin tablet 325 mg (325 mg Oral Given 4/28/23 1948)   pantoprazole injection 40 mg (40 mg Intravenous Given 4/28/23 2153)   hydrALAZINE injection 5 mg (5 mg Intravenous Given 4/29/23 0424)   hydrALAZINE injection 5 mg (5 mg Intravenous Given 4/29/23 0504)     Medical Decision Making:   History:   Old Medical Records: I decided to obtain old medical records.  Initial Assessment:   72 F presents for CP while watching TV  Differential Diagnosis:   Myocardial ischemia  Costochondritis   Viral syndrome  PE  GERD  Gastritis  Cholecystitis  Clinical Tests:   Lab Tests: Reviewed  and Ordered  Radiological Study: Ordered and Reviewed  Medical Tests: Reviewed and Ordered  ED Management:  Initial vitals demonstrated significant HTN otherwise stable. NAD. Pain spontaneously decreasing. EKG nonischemic.  bedside. Pt's labs were grossly non acute. Negative initial troponin and electrolytes / infectious markers stable. Hx of gallstones and given recent pain following eating considered cholecystitis however no reproducible abd pain including RUQ. LFT normal. Provided full strength asa and given pt's persistent HTN provided hydralazine. On home BP meds and reports compliance. Ultimately med consulted and care transitioned. (Jessica)            ED Course as of 05/03/23 1439   Fri Apr 28, 2023 1959 Current vitals heart rate 83, satting 94% on room air, blood pressure 169/107, respirations 17 [RZ]      ED Course User Index  [RZ] Chvey Lopez MD                 Clinical Impression:   Final diagnoses:  [R07.9] Chest pain (Primary)        ED Disposition Condition    Observation Stable                Chevy Lopez MD  05/03/23 2900

## 2023-04-29 NOTE — DISCHARGE SUMMARY
[facility] Discharge Summary    Admitting Physician: Yno Mccauley MD  Attending Physician: No att. providers found  Date of Admit: 4/28/2023  Date of Discharge: 4/30/2023    Condition: Good  Outcome: Patient tolerated treatment/procedure well without complication and is now ready for discharge.  DISPOSITION: Home or Self Care          Discharge Diagnoses     Patient Active Problem List   Diagnosis    Asthma    Chronic obstructive pulmonary disease    Hyperlipidemia    Hypertension    Impaired glucose tolerance    Obesity    Obstructive sleep apnea syndrome    Osteoarthritis of left knee    Psoriasis    Stage 3 chronic kidney disease    Vitamin D deficiency    Wellness examination    Gastroesophageal reflux disease without esophagitis    Dyspnea on exertion    Hypertensive urgency       Principal Problem:  Hypertensive urgency    Consultants and Procedures     Consultants:  None    Procedures:   * No surgery found *     Brief Admission History      Ms. Marisel Resendez is a 72 y.o.  woman with a PMHx of COPD, CKD Stage G3b/A1, solitary kidney (s/p donation), HTN and HLD who presents to [facility]'s ED on 4/28/23 for atypical chest pain. She endorses 2 episodes of substernal pressure-like chest pain; yesterday's episode occurred for 5 minutes before resolving. Today she had another episode with associated symptoms of SOB and radiation of pain to her back. She denies any radiation to her neck, shoulder or arm, light headedness, headaches, blurry vision, palpitations or vomiting. She endorses a short episode of nausea and diaphoresis while on way to ER. She denies exertional chest pain. She endorses intermittent leg swelling, 2-pillow orthopnea and VELEZ, which are chronic issues.     Hospital Course with Pertinent Findings     Patient admitted on 4/28/23 for atypical chest pain and hypertensive urgency. Serial trop were negative. EKG and CXR unremarkable for any ischemic changes/acute cardiopulmonary  "processes. Due to persistently elevated hypertension with resumption of home med Losartan 50 mg daily, added Amlodipine 10 mg daily to regime. Patient discharged on 4/29/23 with [facility] to [facility], outpatient exercise JN, and Cardiology outpatient.     Discharge physical exam:  Vitals  BP: (!) 168/103  Temp: 98.4 °F (36.9 °C)  Temp Source: Oral  Pulse: 75  Resp: 17  SpO2: (!) 93 %  Height: 5' 4.96" (165 cm)  Weight: 99.8 kg (220 lb)    Physical Exam  Vitals and nursing note reviewed.   Constitutional:       Appearance: Normal appearance.   HENT:      Head: Normocephalic.      Mouth/Throat:      Mouth: Mucous membranes are moist.   Eyes:      General: No scleral icterus.     Extraocular Movements: Extraocular movements intact.      Pupils: Pupils are equal, round, and reactive to light.   Cardiovascular:      Rate and Rhythm: Normal rate and regular rhythm.      Pulses: Normal pulses.      Heart sounds: Normal heart sounds. No murmur heard.  Pulmonary:      Effort: Pulmonary effort is normal. No respiratory distress.      Breath sounds: Normal breath sounds. No wheezing or rales.   Abdominal:      General: Abdomen is flat.      Palpations: Abdomen is soft.   Musculoskeletal:         General: No swelling or tenderness. Normal range of motion.      Cervical back: Normal range of motion.      Right lower leg: No edema.      Left lower leg: No edema.   Skin:     General: Skin is warm.      Capillary Refill: Capillary refill takes less than 2 seconds.      Coloration: Skin is not jaundiced or pale.      Findings: No lesion or rash.   Neurological:      General: No focal deficit present.      Mental Status: She is alert and oriented to person, place, and time. Mental status is at baseline.         TIME SPENT ON DISCHARGE: 60 minutes    Discharge Medications        Medication List        START taking these medications      amLODIPine 5 MG tablet  Commonly known as: NORVASC  Take 2 tablets (10 mg total) by mouth " once daily.            CONTINUE taking these medications      albuterol 90 mcg/actuation inhaler  Commonly known as: PROVENTIL/VENTOLIN HFA     albuterol-ipratropium 2.5 mg-0.5 mg/3 mL nebulizer solution  Commonly known as: DUO-NEB  Take 3 mLs by nebulization 4 (four) times daily as needed for Shortness of Breath or Wheezing.     azithromycin 250 MG tablet  Commonly known as: Z-GRISELDA  Take 2 tablets by mouth on day 1; Take 1 tablet by mouth on days 2-5     benzonatate 100 MG capsule  Commonly known as: TESSALON  Take 1 capsule (100 mg total) by mouth 3 (three) times daily as needed for Cough.     calcium carbonate 500 mg calcium (1,250 mg) tablet  Commonly known as: OS-GINGER     cetirizine 10 MG tablet  Commonly known as: ZYRTEC     diclofenac sodium 1 % Gel  Commonly known as: VOLTAREN     fluticasone-umeclidin-vilanter 100-62.5-25 mcg Dsdv  Commonly known as: TRELEGY ELLIPTA  Inhale 1 puff into the lungs once daily.     losartan 50 MG tablet  Commonly known as: COZAAR  Take 1 tablet (50 mg total) by mouth once daily.     omeprazole 20 MG capsule  Commonly known as: PRILOSEC  Take 1 capsule (20 mg total) by mouth once daily.     predniSONE 10 MG tablet  Commonly known as: DELTASONE  Take 1 tablet (10 mg total) by mouth once daily. for 5 days     rosuvastatin 40 MG Tab  Commonly known as: CRESTOR  Take 1 tablet (40 mg total) by mouth every evening.               Where to Get Your Medications        These medications were sent to [facility]/pharmacy #[id number] [place], [place] - [address]  [address] [address]      Phone: [phone]   amLODIPine 5 MG tablet  losartan 50 MG tablet         Discharge Information:   Marisel Resendez is being discharged Home or Self Care.    Discharge Procedure Orders   Ambulatory referral/consult to Cardiology   Standing Status: Future   Referral Priority: Routine Referral Type: Consultation   Referral Reason: Specialty Services Required   Requested Specialty: Cardiology    Number of Visits Requested: 1     Ambulatory referral/consult to Internal Medicine   Standing Status: Future   Referral Priority: Routine Referral Type: Consultation   Referral Reason: Specialty Services Required   Requested Specialty: Internal Medicine   Number of Visits Requested: 1     Nuclear Stress - Cardiology Interpreted   Standing Status: Future Standing Exp. Date: 04/29/24     Order Specific Question Answer Comments   Which stress agent will be used? Exercise    Release to patient Immediate         Follow-Up Appointments:      To address at follow-up:  -The following labs are to be drawn at the [facility] visit: Vitals, optimize bp  -The following imaging studies are to be ordered at the [facility] visit: None    The above information was discussed with the patient in clear terms. Patient was able to repeat the instructions to me in their own words. All questions answered. ED precautions provided.    Mati Bingham MD  [facility], [facility]

## 2023-04-29 NOTE — H&P
[facility] History and Physical     Patient Name: Marisel Resendez  MRN: [id number]  Admission Date: 4/28/2023  Hospital Length of Stay: 0 days  Code Status: Full Code  Attending Provider: Yon Mccauley MD  Primary Care Provider: BIRGIT Stafford     Chief Complaint:   Chest Pain (Pt c/o acute onset of midline chest pressure while watching tv prior to arrival, also c/o sob and nausea, denies vomiting/palpitations)      Subjective:      Brief HPI:  Ms. Marisel Resendez is a 72 y.o.  woman with a PMHx of COPD, CKD Stage G3b/A1, solitary kidney (s/p donation), HTN and HLD who presents to [facility]'s ED on 4/28/23 for atypical chest pain. She endorses 2 episodes of substernal pressure-like chest pain; yesterday's episode occurred for 5 minutes before resolving. Today she had another episode with associated symptoms of SOB and radiation of pain to her back. She denies any radiation to her neck, shoulder or arm, light headedness, headaches, blurry vision, palpitations or vomiting. She endorses a short episode of nausea and diaphoresis while on way to ER. She denies exertional chest pain. She endorses intermittent leg swelling, 2-pillow orthopnea and VELEZ, which are chronic issues.     Social Hx: Quit in 2016, 50 ppd. Denies alcohol or illicit drug use. Endorses daily ambulation without experience of anginal symptoms.    Current Outpatient Medications   Medication Instructions    albuterol (PROVENTIL/VENTOLIN HFA) 90 mcg/actuation inhaler 1 puff, Inhalation, Every 6 hours PRN    albuterol-ipratropium (DUO-NEB) 2.5 mg-0.5 mg/3 mL nebulizer solution 3 mLs, Nebulization, 4 times daily PRN    azithromycin (Z-GRISELDA) 250 MG tablet Take 2 tablets by mouth on day 1; Take 1 tablet by mouth on days 2-5<BR>    benzonatate (TESSALON) 100 mg, Oral, 3 times daily PRN    calcium carbonate (OS-GINGER) 500 mg calcium (1,250 mg) tablet 1 tablet, Oral, 2 times daily    cetirizine (ZYRTEC) 10 mg, Oral, Daily     diclofenac sodium (VOLTAREN) 1 % Gel Topical, 4 times daily PRN    fluticasone-umeclidin-vilanter (TRELEGY ELLIPTA) 100-62.5-25 mcg DsDv 1 puff, Inhalation, Daily    losartan (COZAAR) 50 mg, Oral, Daily    omeprazole (PRILOSEC) 20 mg, Oral, Daily    predniSONE (DELTASONE) 10 mg, Oral, Daily    rosuvastatin (CRESTOR) 40 mg, Oral, Nightly          Review of Systems:  Review of Systems   Constitutional:  Positive for diaphoresis. Negative for chills and fever.   Eyes:  Negative for blurred vision.   Respiratory:  Positive for cough and shortness of breath. Negative for sputum production and wheezing.    Cardiovascular:  Positive for chest pain, orthopnea and leg swelling. Negative for palpitations and PND.   Gastrointestinal:  Positive for nausea. Negative for abdominal pain, constipation, diarrhea and vomiting.   Genitourinary:  Negative for dysuria, frequency and urgency.   Musculoskeletal:  Negative for falls.   Neurological:  Negative for dizziness, seizures, loss of consciousness and headaches.        Objective:     Vital Signs:  Vital Signs (Most Recent):  Temp: 98.5 °F (36.9 °C) (04/28/23 1914)  Pulse: 79 (04/28/23 2030)  Resp: 16 (04/28/23 2030)  BP: (!) 125/104 (04/28/23 2030)  SpO2: 96 % (04/28/23 2030)  Body mass index is 36.65 kg/m².  Weight: 99.8 kg (220 lb) Vital Signs (24h Range):  Temp:  [98.5 °F (36.9 °C)] 98.5 °F (36.9 °C)  Pulse:  [79-91] 79  Resp:  [16-23] 16  SpO2:  [94 %-97 %] 96 %  BP: (125-203)/() 125/104       Input/output:   No intake or output data in the 24 hours ending 04/28/23 2121    Physical Examination:  Physical Exam  Vitals and nursing note reviewed.   Constitutional:       Appearance: Normal appearance.   HENT:      Head: Normocephalic.      Mouth/Throat:      Mouth: Mucous membranes are moist.   Eyes:      General: No scleral icterus.     Extraocular Movements: Extraocular movements intact.      Pupils: Pupils are equal, round, and reactive to light.   Cardiovascular:       Rate and Rhythm: Normal rate and regular rhythm.      Pulses: Normal pulses.      Heart sounds: Murmur (S1 murmur noted, most appreciated along LT sternal border.) heard.   Pulmonary:      Effort: Pulmonary effort is normal. No respiratory distress.      Breath sounds: Wheezing present. No rales.   Abdominal:      General: Abdomen is flat. There is no distension.      Palpations: Abdomen is soft.      Tenderness: There is no abdominal tenderness. There is no guarding.   Musculoskeletal:         General: Tenderness present. No swelling. Normal range of motion.      Cervical back: Normal range of motion.      Right lower leg: No edema.      Left lower leg: No edema.   Skin:     General: Skin is warm.      Capillary Refill: Capillary refill takes less than 2 seconds.      Coloration: Skin is not jaundiced or pale.      Findings: No lesion or rash.   Neurological:      General: No focal deficit present.      Mental Status: She is alert and oriented to person, place, and time. Mental status is at baseline.         Lines/Drains/Airways       None                    Laboratory:    Recent Labs   Lab 04/28/23 1931   WBC 10.0   HGB 14.4   HCT 43.9      MCV 98.4*   RDW 12.5     Recent Labs   Lab 04/28/23 1931   TROPONINI <0.010   BNP 44.3     Recent Labs   Lab 04/28/23 1931   TROPONINI <0.010   BNP 44.3     Recent Labs   Lab 04/24/23  0836   CHOL 142   HDL 52   TRIG 89    Recent Labs   Lab 04/28/23 1931      K 4.4   CHLORIDE 109*   CO2 25   BUN 25.8*   CREATININE 1.40*   CALCIUM 9.8     Recent Labs   Lab 04/28/23 1931   ALBUMIN 4.5   BILITOT 0.7   AST 49*   ALKPHOS 139   ALT 29     No results for input(s): IRON, TIBC, FERRITIN, SATURATEDIRO, [id number], FOLATE in the last 168 hours.  No results for input(s): TSH, Q4MUMZC, HGBA1C, INR, PROTIME, PTT in the last 168 hours.           Other Results:    Current Medications:     Infusions:        Scheduled:   [START ON 4/29/2023] atorvastatin  80 mg Oral Daily     enoxaparin  40 mg Subcutaneous Daily    [START ON 4/29/2023] losartan  50 mg Oral Daily         PRN:   benzonatate    melatonin    nitroGLYCERIN    sodium chloride 0.9%        Microbiology Data:  Microbiology Results (last 7 days)       ** No results found for the last 168 hours. **             Antibiotics and Day Number of Therapy:  Antibiotics (From admission, onward)      None             Imaging:  X-Ray Chest AP Portable  Narrative: EXAMINATION:  XR CHEST AP PORTABLE    CLINICAL HISTORY:  Chest Pain;    TECHNIQUE:  One view    COMPARISON:  December 30, 2019..    FINDINGS:  Cardiopericardial silhouette is within normal limits.  Left lower lung lobe retrocardiac area is obscured on this portable radiograph.  Bibasilar hypoventilatory changes without definite dense focal or segmental consolidation, congestive process, pleural effusions or pneumothorax.  Impression: NO ACUTE CARDIOPULMONARY PROCESS IDENTIFIED.    Electronically signed by: Clem Martin  Date:    04/28/2023  Time:    20:08        2D ECHO Results    Results for orders placed during the hospital encounter of 02/28/23    Echo    Interpretation Summary  · The left ventricle is normal in size with normal systolic function.  · The estimated ejection fraction is 65%.  · Normal left ventricular diastolic function.  · Normal right ventricular size with normal right ventricular systolic function.  · Normal central venous pressure (3 mmHg).  · The estimated PA systolic pressure is 13 mmHg.  · IVC is normal.  · There is no pulmonary hypertension.  · Mild left atrial enlargement.        Pulmonary Functions Testing Results:    No results found for: FEV1, FVC, EVM3TVA, TLC, DLCO    Assessment & Plan:     Atypical Chest Pain  Hypertensive Urgency  HTN  - Endorses substernal pressure-like chest pain, at rest. Relieved with nitroglycerin in ED  - HEART Score 5; Loaded with  mg in ED  - /89 in ED; reduction to 161/86 and 125/104 following nitroglycerin each  time  - Trop negative, EKG unremarkable for ischemic changes   - Ordered CK and CK-MB   - Trop and EKGs q6hr for 3 occurences  - Continue Losartan 50 mg daily  - Nitroglycerin 0.4 mg SL PRN for chest pain  - TTE 2/28/23 revealed EF 65%, mild LA enlargement  - Overall low suspicion for ACS at this time, but given risk factors (age, HTN, smoking hx) and overall presentation, will keep patient overnight for observation  - No signs of end organ damage at this time    COPD  DARRON  Hx of Tobacco Use  - Follows with BIRGIT Stafford for COPD management  - Currently using 2 L NC at night; unable to tolerate CPAP mask, scheduled for nasal fitting for DARRON needs  - Satting 96% on 2 L NC, no acute distress, will continue to monitor  - Benzonatate 100 mg q8hr PRN for cough  - 50 ppd, quit date 2016    CKD Stage G3b/A1  Solitary Kidney s/p Radical Nephrectomy (2003)  - s/p kidney donation to nephew  - BUN/Cr 25.8/1.4 (baseline Cr 1.4)  - Currently stable    HLD  - Ordered Lipitor 80 mg daily (home med: Crestor 40 mg daily)          CODE STATUS: Full Code   Access: PIV  Antibiotics: None  Diet: Diet heart healthy  DVT Prophylaxis: lovenox  GI Prophylaxis: none  Fluids: LR @ 125 cc/hr    Disposition: Patient admitted on 4/28/23 for unstable angina. Given risk factors and presentation, will keep patient overnight for observation.      Mati Bingham MD  [facility], [facility]

## 2023-05-01 ENCOUNTER — TELEPHONE (OUTPATIENT)
Dept: INTERNAL MEDICINE | Facility: CLINIC | Age: 73
End: 2023-05-01

## 2023-05-01 PROBLEM — Z00.00 WELLNESS EXAMINATION: Status: RESOLVED | Noted: 2022-07-22 | Resolved: 2023-05-01

## 2023-05-03 ENCOUNTER — PATIENT MESSAGE (OUTPATIENT)
Dept: INTERNAL MEDICINE | Facility: CLINIC | Age: 73
End: 2023-05-03

## 2023-05-03 ENCOUNTER — OFFICE VISIT (OUTPATIENT)
Dept: INTERNAL MEDICINE | Facility: CLINIC | Age: 73
End: 2023-05-03

## 2023-05-03 VITALS
HEIGHT: 64 IN | HEART RATE: 91 BPM | SYSTOLIC BLOOD PRESSURE: 95 MMHG | DIASTOLIC BLOOD PRESSURE: 65 MMHG | RESPIRATION RATE: 20 BRPM | BODY MASS INDEX: 36.81 KG/M2 | TEMPERATURE: 98 F | WEIGHT: 215.63 LBS

## 2023-05-03 DIAGNOSIS — R07.9 CHEST PAIN, UNSPECIFIED TYPE: ICD-10-CM

## 2023-05-03 DIAGNOSIS — R74.01 TRANSAMINITIS: Primary | ICD-10-CM

## 2023-05-03 DIAGNOSIS — R07.89 ATYPICAL CHEST PAIN: ICD-10-CM

## 2023-05-03 DIAGNOSIS — R60.0 BILATERAL LEG EDEMA: ICD-10-CM

## 2023-05-03 DIAGNOSIS — R29.898 LEG FATIGUE: ICD-10-CM

## 2023-05-03 DIAGNOSIS — I10 PRIMARY HYPERTENSION: ICD-10-CM

## 2023-05-03 PROBLEM — I16.0 HYPERTENSIVE URGENCY: Status: RESOLVED | Noted: 2023-04-29 | Resolved: 2023-05-03

## 2023-05-03 LAB
OB IA INT NEG CNTRL (OHS): NEGATIVE
OB IA INT POS CNTRL (OHS): POSITIVE
OCCULT BLD IA (OHS): NEGATIVE

## 2023-05-03 PROCEDURE — 99215 OFFICE O/P EST HI 40 MIN: CPT | Mod: PBBFAC | Performed by: NURSE PRACTITIONER

## 2023-05-03 PROCEDURE — 99214 OFFICE O/P EST MOD 30 MIN: CPT | Mod: S$PBB,,, | Performed by: NURSE PRACTITIONER

## 2023-05-03 PROCEDURE — 99214 PR OFFICE/OUTPT VISIT, EST, LEVL IV, 30-39 MIN: ICD-10-PCS | Mod: S$PBB,,, | Performed by: NURSE PRACTITIONER

## 2023-05-03 NOTE — PROGRESS NOTES
"BIRGIT Stafford   [facility]  [address] [facility] [address]      PATIENT NAME: Marisel Resendez  : 1950  DATE: 5/3/23  MRN: [id number]        Reason for Visit / Chief Complaint: Follow-up (Hospital f/u chest pain)       History of Present Illness / Problem Focused Workflow     Marisel Resendez presents to the clinic with Follow-up (Hospital f/u chest pain)     Initial Visit (18): 68 y.o.  female presenting to the clinic to re-establish primary care. Previous PCP MELLY Delatorre NP. PMHx significant for HTN, HLD, CKD Stg III, Solitary Kidney (kidney donor), COPD and DARRON. Seen in Ortho Clinic in 2018 for R knee pain. Bp at goal today. Currently taking Losartan 25 mg po daily. Denies ADR. No refills needed at this time. FLP needed. LDL 78 (2017). Currently taking Atorvastatin 40 mg po daily. Tolerating well. Following Renal Clinic for CKD Stg III. Last OV 2018. Renal indices stable. COPD managed with Advair daily and Duonebs/ProAir inhaler as needed. She reports that she was hospitalized over the past year with pneumonia. She was given Spiriva in addition to her current medications. She does not take the Spiriva every day, only "when I feel I need it." Occasional cough with clear sputum. This is pt's norm. Reports taking Mucinex as needed and using spirometer as previously instructed. Hx of DARRON. Does not use CPAP d/t inability to tolerate. Sleeps with O2 qHS and reports awakening feeling "good and energized." Pt did express that she wanted to mention some forgetfulness lately. Admits occasionally beginning a task and forgetting to complete task. Has pulled out medication before but forgot to take it. Overall, she states, "I'm not concerned about it. I understand that I'm getting older. I just thought I would mention it so that we can monitor it." She is also concerned about a dry, erythemic patch to left side of " "nasal bridge that has been present for many years. She is concerned because she reports heavy sun exposure in younger years with inadequate sun protection. She would like region "checked out." Overall, pt reports that she feels "good." No longer a tobacco user. Uses tobacco creams/ointments PRN for psoriasis flares. States flares are rare. Denies fever, chills, HA, CP, SOB, Abd pain, Dysuria, B/B dysfunction, or any other concerns today.     5/8/19: 68 y.o.  female with PMHx significant for HTN, HLD, CKD Stg III, Solitary Kidney (kidney donor), COPD and DARRON, presenting for routine f/u. Bp at goal today. Currently taking Losartan 25 mg po daily. Renal indices stable. HgA1c 6.0%. . Pt recently had top teeth removed and dentures placed. States that she ate 1 cup of ice cream for an entire week. Pt believes this to be cause of elevated LDL. Plans to resume previous low-fat diet. Taking Atorvastatin 40 mg po daily. Tolerating well. Following Renal Clinic for CKD Stg III. Renal indices have been stable. Seen by Renal this am. Scheduled to f/u in 6 months. Hx of gallstones. Denies any recent abd complaints. COPD managed with Advair daily and Duonebs/ProAir inhaler as needed. Using Spiriva infrequently. Requesting med refills. Previously prescribed Fioricet for migraines. Reports significant improvement in migraines. Very pleased with medication effectiveness. Overall, pt reports that she's been feeling "great." Denies fever, chills, HA, CP, SOB, productive cough, Abd pain, Dysuria, B/B dysfunction, or any other concerns today.     (11/8/19): Ms. Joiner is presenting for a 6-mth f/u. PMHx significant for HTN, HLD, CKD Stg III, Solitary Kidney (kidney donor), COPD and DARRON. She was seen in Renal Clinic 10/9/19. Renal indices stable. She will return to Renal clinic in 6 mths. Alk remains slightly elevated. Hx of gallstones without any acute complaints. Bp at goal. HgA1c 5.9%. FLP WNL; LDL 58. COPD managed with " Advair twice a day, Spiriva as needed, and DuoNebs/Albuterol inhaler PRN. Mammo 10/25/19 negative. Bone Density 6/2018 WNL. Denies fever, chills, HA, CP, SOB, productive cough at present, Abd pain, Dysuria, B/B dysfunction, or any other concerns today.     ED F/u 1/6/20: Ms. Joiner is presenting for an ED f/u. PMHx significant for HTN, HLD, CKD Stg III, Solitary Kidney (kidney donor), COPD and DARRON. She presented to ED 12/30/19 with c/o cough, chills, and generalized body aches x 3 days. She thought she had the flu. Flu swab was negative. She was diagnosed with PNA. CXR revealed: Small, patchy opacity in the left lower lung may relate to atelectasis or pneumonia. She was given prescriptions for codeine-guaifenesin 10mg-100mg/5mL 5mL q6h prn cough and Levaquin 750 mg po daily x 5 days. She's completed the abx. Still using the cough syrup stating that cough and SOB with exertion is about the same. She's also c/o nasal congestion, HAs, and sinus pressure. O2 sat is 94% on RA. This is baseline for pt. She is afebrile at present. No other problems stated.     (7/24/2020): 69 y.o.  female with a PMHx significant for HTN, HLD, CKD Stg III, Solitary Kidney (kidney donor), psoriasis, OA, PNA, COPD and DARRON, presenting for routine f/u. Bp at goal today. Currently taking Losartan 25 mg po daily. Denies ADR. . Currently taking Atorvastatin 40 mg po daily. Tolerating well. Following Renal Clinic for CKD Stg III. Last OV 7/16/20. Renal indices stable. COPD managed with Advair daily and Duonebs/ProAir inhaler as needed. Uses Spiriva as needed. She endorses occasional dyspnea and wheezing with moderate exertion that resolves with rest. She is using O2 via NC at night and occasionally during a nap when she feels fatigued. Today, pt is c/o left hip pain x 2-3 weeks, occurring mainly at night when she lies flat. No known injuries. Pain throbbing and aching in quality. Moderate to severe intensity. Provoked by lying flat.  "Minimal relief with changing positions. Pain interferes with restful sleep. Unable to take NSAIDs. Has been "taking a lot of Tylenol lately" with no relief. Has used Tramadol in the past with some relief. States she was given Tramadol during an ED visit for pain in the past since she can't take "anything else because of my kidneys." She's otherwise doing well. Has started a Weight Watcher's program x 1 week. Has lost 3 lbs. No other problems stated.     Health Maintenance:   Colon Ca Screening-FIT 12/1/19, negative   Breast Ca Screening-Mammo 10/25/19, negative   Lung Ca Screening-declines     (1/25/2021): 70 y.o.  female with a PMHx significant for HTN, HLD, CKD Stg III, Solitary Kidney (kidney donor), psoriasis, OA, PNA, COPD and DARRON, presenting for routine f/u. Bp at goal today. Currently taking Losartan 25 mg po daily. Renal indices stable. She f/u with Renal via telemedicine last week. COPD managed with Advair daily and Duonebs/ProAir inhaler as needed. Uses Spiriva as needed. She endorses occasional dyspnea and wheezing with moderate exertion that resolves with rest. She is using O2 via NC at night and occasionally during a nap when she feels fatigued. She does report an increase in cough and mucous production over the last week that she attributes to the changing climate. She denies any abnl color to mucous, fever, chills, or worsening dyspnea. States sx improved with spirometer and CATE. On CPAP for DARRON. FLP WNL. Taking Atorvastatin and tolerating well. Previously referred to Sx clinic for gallstones. No appt today. C/o epigastric burning. Trying to avoid triggering foods. She had an eye exam at an outside clinic and was diagnosed with cataracts. States she'd noticed a change in vision, trouble seeing print close up, and cloudiness for a while. Needs a referral to ophthalmology. She is amenable to receiving the Shingrix vaccine. She denies any B/B complaints or falls. States feeling well. No other " "concerns.  Health Maintenance:   Colon Ca Screening-FIT 12/1/19, negative   Breast Ca Screening-Mammo 10/2020, negative   Lung Ca Screening-declines   Bone Density 10/2020, normal BMD     (7/26/2021): 70 y.o.  female with a PMHx significant for HTN, HLD, CKD Stg III, Solitary Kidney (kidney donor), psoriasis, OA, PNA, COPD and DARRON, presenting for routine f/u. Bp at goal today. Currently taking Losartan 25 mg po daily. Renal indices stable. No microalb on urine test. TSH WNL. She continues to follow renal clinic. COPD managed with Advair daily and Duonebs/ProAir inhaler as needed. Uses Spiriva as needed. She endorses occasional dyspnea and wheezing with moderate exertion that resolves with rest. She is using O2 via NC at night and occasionally during a nap when she feels fatigued. No changes from baseline. She would like a refill on benzonatate for PRN use cough. She's been evaluated in Sx clinic of gallstones. Due to higher surgical candidate risk given comorbidities, it was decided that pt would opt out of any unnecessary surgical procedures. Admits epigastric discomfort improves with omeprazole PRN. Screening mammo due 10/2021. Pt scheduled for cataract sx next month. She is requesting medication refills. No acute concerns.     (1/27/2022): 71 y.o.  female with a PMHx significant for HTN, HLD, CKD Stg III, Solitary Kidney (kidney donor), psoriasis, OA, PNA, COPD and DARRON, presenting for routine f/u. Bp at goal today. Currently taking Losartan 25 mg po daily. Renal indices stable. No microalb on urine test. She did see Renal Clinic 1/24/22. Noted stable with RTC 6 mths. Other labs reviewed and stable compared to baseline. Patient presented to ED on 1/7/22 with c/o left knee pain. Patient states her dogs hit the back of her left leg about three weeks ago and it "popped." Endorses pain to lateral aspect of knee and swelling. Has tried Tylenol for pain with no relief. Certain positions makes pain " "worse, movement makes pain better by decreasing "soreness and stiffness." In ER, XR of knee negative; prescribed prednisone and Voltaren. Appreciates a referral to Ortho. Denies falls. Reports "my lungs are doing well." She uses her spirometer regularly. Maintained with Advair and Devin PRN. No other concerns at this time.      Mammogram 11/2021 negative.     7/25/22: Patient presenting for routine f/u. VSS. Pt went to lab last week but only an A1c was drawn despite having labs orders in for her Renal appt today. A1c was WNL at 5.5. She reports that she's been feeling well. No new or worsening baseline dyspnea or chest pain. [facility] has been Ortho for OA to knees. S/p CSI which helped. Admits working on weight loss to improve knee stability and pain. Requesting refills on Advair and Benzonatate (states uses PRN cough). No recent COPD exacerbations. Due for FIT this month, DXA in October, and mammogram in November. She is amenable to all screenings. No other concerns.     1/25/23: Patient presenting for routine f/u. She contacted clinic about one week ago reporting Bp was above goal (150s-160s/80s-90s). She was taking Losartan 25 mg po daily. She was instructed to increase Losartan to 50 mg po daily. [facility] Bp has improved since doing so. Bp is at goal today. She is endorsing increased dyspnea (trouble ambulating 100ft or greater) and fatigue. Admits checking o2 saturation at home and noting numbers in the low 90s. States feels better when saturation is in the mid-90s. Admits having oxygen at home but primarily uses qHS due to fear of dependency. She does not endorse chest pain or increased LE edema. She does have a h/o DARRON but is not under treatment due to inability to tolerate face mask. She recently completed labs that were significant for an abnl FLP (worsened from previous). Admits following a low-fat diet and taking Atorvastatin 40 mg as prescribed. Other labs stable compared to baseline. She had a normal " "mammogram and DXA in November. She is amenable to pneumonia vaccine per recs. She has no other concerns.      4/25/23: Patient presenting for 3-month f/u. She was started on Trelegy at last visit. States work "great." She was able to get prescription assistance for the medication via the 's website. Completed PFTs and an Echo 2/28/23;     PFTs:   Nonspecific decrease in flow rates with no measured obstruction or restriction     Echo:  ·           The left ventricle is normal in size with normal systolic function.  ·           The estimated ejection fraction is 65%.  ·           Normal left ventricular diastolic function.  ·           Normal right ventricular size with normal right ventricular systolic function.  ·           Normal central venous pressure (3 mmHg).  ·           The estimated PA systolic pressure is 13 mmHg.  ·           IVC is normal.  ·           There is no pulmonary hypertension.  ·           Mild left atrial enlargement.     At last visit, she was to continue Losartan 50 mg po daily (previously changed). Doing well with BP WNL. And, she was started on Crestor for better HLD control. FLP improved significantly. LDL 70s as compared to 150s at last visit. Tolerating well.     She is c/o exacerbation of COPD sx.     Cough  This is a new problem. The current episode started 1 to 4 weeks ago. The problem has been waxing and waning. The problem occurs every few minutes. The cough is Productive of sputum. Associated symptoms include headaches, nasal congestion and wheezing. Pertinent negatives include no chest pain, chills, ear congestion, ear pain, fever, heartburn, hemoptysis, myalgias, postnasal drip, rash, rhinorrhea, sore throat, shortness of breath, sweats or weight loss. The symptoms are aggravated by cold air and pollens. She has tried a beta-agonist inhaler and prescription cough suppressant for the symptoms. The treatment provided mild relief. Her past medical history is significant " "for COPD.     5/3/23: Patient presenting for hospital f/u. She presented to the ED 4/28/23 with recurrent, squeezing, CP unrelieved at home. Bp was noted elevated as well after being WNL at routine office visit 4/25/23. Serial trop were negative. EKG and CXR unremarkable for any ischemic changes/acute cardiopulmonary processes. Note, she had an unremarkable Echo 2/2023 (see above). Due to persistently elevated hypertension, she was admitted overnight for observation. Her Losartan 50 mg daily was continued and Amlodipine 10 mg daily was added to regime. Patient discharged on 4/29/23 with referral to [facility], outpatient exercise J[facility], and Cardiology outpatient. Her Lexiscan is scheduled 5/24/23. No appt in Cards yet. States after starting the Amlodipine initially, she felt "woozy" for a few days. Feeling better today but BP is lower end of normal. Also experiencing some leg fatigue, R>L, since hospitalization.    Of note, LFTs were noted trending upward throughout hospitalization and on day of discharge.      Review of Systems     Review of Systems   Respiratory:  Negative for apnea, cough, choking, chest tightness, shortness of breath, wheezing and stridor.    Cardiovascular:  Negative for chest pain, palpitations and leg swelling.   Musculoskeletal:  Positive for arthralgias.   All other systems reviewed and are negative.    Medical / Social / Family History     Past Medical History:   Diagnosis Date    Anxiety disorder, unspecified     CKD (chronic kidney disease)     COPD (chronic obstructive pulmonary disease)     Dyslipidemia     History of radical nephrectomy     HTN (hypertension)     DARRON (obstructive sleep apnea)        Past Surgical History:   Procedure Laterality Date    bilateral oophrectomy      CATARACT EXTRACTION      CATARACT EXTRACTION W/ INTRAOCULAR LENS IMPLANT Right 08/05/2021    CATARACT EXTRACTION W/ INTRAOCULAR LENS IMPLANT Left 07/13/2021    EYE SURGERY  08/2021    cataract    " "HYSTERECTOMY  1997       Social History  Ms.  reports that she quit smoking about 6 years ago. Her smoking use included cigarettes. She has been exposed to tobacco smoke. She has never used smokeless tobacco. She reports that she does not currently use alcohol. She reports that she does not use drugs.    Family History  Ms.'s family history includes Cancer in her mother; Heart attack in her father; Heart failure in her father.    Medications and Allergies     Medications  Current Outpatient Medications   Medication Instructions    albuterol (PROVENTIL/VENTOLIN HFA) 90 mcg/actuation inhaler 1 puff, Inhalation, Every 6 hours PRN    albuterol-ipratropium (DUO-NEB) 2.5 mg-0.5 mg/3 mL nebulizer solution 3 mLs, Nebulization, 4 times daily PRN    amLODIPine (NORVASC) 10 mg, Oral, Daily    benzonatate (TESSALON) 100 mg, Oral, 3 times daily PRN    calcium carbonate (OS-GINGER) 500 mg calcium (1,250 mg) tablet 1 tablet, Oral, 2 times daily    cetirizine (ZYRTEC) 10 mg, Oral, Daily    diclofenac sodium (VOLTAREN) 1 % Gel Topical, 4 times daily PRN    fluticasone-umeclidin-vilanter (TRELEGY ELLIPTA) 100-62.5-25 mcg DsDv 1 puff, Inhalation, Daily    losartan (COZAAR) 50 mg, Oral, Daily    omeprazole (PRILOSEC) 20 mg, Oral, Daily    rosuvastatin (CRESTOR) 40 mg, Oral, Nightly       Allergies  Review of patient's allergies indicates:  No Known Allergies    Physical Examination   Visit Vitals  BP 95/65 (BP Location: Left arm, Patient Position: Sitting, BP Method: Large (Automatic))   Pulse 91   Temp 98.1 °F (36.7 °C) (Oral)   Resp 20   Ht 5' 4" (1.626 m)   Wt 97.8 kg (215 lb 9.6 oz)   BMI 37.01 kg/m²     Physical Exam  Constitutional:       Appearance: Normal appearance.   HENT:      Head: Normocephalic and atraumatic.      Right Ear: External ear normal.      Left Ear: External ear normal.   Eyes:      Extraocular Movements: Extraocular movements intact.   Cardiovascular:      Rate and Rhythm: Normal rate and regular rhythm.      " Pulses: Normal pulses.      Heart sounds: Normal heart sounds.   Pulmonary:      Effort: Pulmonary effort is normal.      Breath sounds: Normal breath sounds. No wheezing or rales.   Abdominal:      General: Bowel sounds are normal.      Palpations: Abdomen is soft.   Musculoskeletal:         General: Normal range of motion.      Right lower leg: Edema present.      Left lower leg: Edema present.   Skin:     General: Skin is warm and dry.   Neurological:      General: No focal deficit present.      Mental Status: She is alert and oriented to person, place, and time.   Psychiatric:         Mood and Affect: Mood normal.         Behavior: Behavior normal.         Thought Content: Thought content normal.         Judgment: Judgment normal.         Results     Chemistry:  Lab Results   Component Value Date     04/29/2023    K 4.1 04/29/2023    CHLORIDE 113 (H) 04/29/2023    BUN 25.9 (H) 04/29/2023    CREATININE 1.28 (H) 04/29/2023    EGFRNORACEVR 45 04/29/2023    GLUCOSE 90 04/29/2023    CALCIUM 9.0 04/29/2023    ALKPHOS 134 04/29/2023    LABPROT 6.8 04/29/2023    ALBUMIN 4.0 04/29/2023    BILIDIR 0.2 01/11/2019    IBILI 0.3 01/11/2019     (H) 04/29/2023    ALT 77 (H) 04/29/2023    MG 2.1 11/08/2018    PHOS 3.0 11/08/2018    TMMPYCQT45HK 40.08 11/08/2018        Lab Results   Component Value Date    HGBA1C 5.5 07/21/2022        Hematology:  Lab Results   Component Value Date    WBC 9.8 04/29/2023    HGB 13.6 04/29/2023    HCT 41.8 04/29/2023     04/29/2023       Lipid Panel:  Lab Results   Component Value Date    CHOL 142 04/24/2023    HDL 52 04/24/2023    LDL 72.00 04/24/2023    TRIG 89 04/24/2023    TOTALCHOLEST 3 04/24/2023        Urine:  Lab Results   Component Value Date    COLORUA Light-Yellow 01/24/2023    APPEARANCEUA Clear 01/24/2023    SGUA 1.025 01/24/2023    PHUA 6.5 01/24/2023    PROTEINUA Trace (A) 01/24/2023    GLUCOSEUA Normal 01/24/2023    KETONESUA Negative 01/24/2023    BLOODUA  "Negative 01/24/2023    NITRITESUA Negative 01/24/2023    LEUKOCYTESUR Negative 01/24/2023    RBCUA 0-5 01/24/2023    WBCUA 0-5 01/24/2023    BACTERIA None Seen 01/24/2023    SQEPUA Few (A) 01/24/2023    HYALINECASTS None Seen 01/24/2023    CREATRANDUR 243.0 11/08/2018    PROTEINURINE 43.9 11/08/2018          Assessment        ICD-10-CM ICD-9-CM   1. Transaminitis  R74.01 790.4   2. Chest pain  R07.9 786.50   3. Leg fatigue  R29.898 729.89   4. Bilateral leg edema  R60.0 782.3   5. Primary hypertension  I10 401.9   6. Atypical chest pain  R07.89 786.59        Plan (including Health Maintenance)     Problem List Items Addressed This Visit          Cardiac/Vascular    Hypertension    Overview     Losartan 50 mg po daily           Current Assessment & Plan     Bp lower end of normal  Continue Losartan. Will monitor Amlodipine 10 mg po daily. If "woozy" feeling, edema, and leg fatigue continues in light of lower BP, will consider dec Amlodipine to 5 mg po daily--pt agreeable and will f/u via portal  DASH              Orthopedic    Leg fatigue    Current Assessment & Plan     See "bilateral leg edema"              Other    Bilateral leg edema    Current Assessment & Plan     Very mild but present  Could be 2/2 amlodipine and some deconditioning due to recent hospitalization  Pt defers vascular studies for now. Will monitor and notify me with updates via portal           Atypical chest pain    Current Assessment & Plan     Eryn scan 5/24/23  Awaiting Cards  ED precautions            Other Visit Diagnoses       Transaminitis    -  Primary    Relevant Orders    US Abdomen Limited    Comprehensive Metabolic Panel    Chest pain        Relevant Orders    Ambulatory referral/consult to Cardiology              Health Maintenance Due   Topic Date Due    COVID-19 Vaccine (3 - Booster for Pfizer series) 05/07/2021       Future Appointments   Date Time Provider Department Center   5/24/2023  7:10 AM CV UL EXERCISE STRESS 01 [facility] " CARDIA [facility]   5/24/2023  7:15 AM [facility] NM1  LB LIMIT [facility] NUCMED [facility]   5/24/2023  7:45 AM [facility] NM1  LB LIMIT [facility] NUCMED [facility]   7/25/2023  7:30 AM BIRGIT Stafford INTMED [facility]   7/31/2023  9:15 AM BIRGIT Licea NEPHR [facility]   11/2/2023  7:45 AM PROVIDERS, [facility]        Follow up if symptoms worsen or fail to improve.    Signature:  BIRGIT Stafford  [facility]  [address] [facility] [address]    Date of encounter: 5/3/23

## 2023-05-03 NOTE — ASSESSMENT & PLAN NOTE
"Bp lower end of normal  Continue Losartan. Will monitor Amlodipine 10 mg po daily. If "woozy" feeling, edema, and leg fatigue continues in light of lower BP, will consider dec Amlodipine to 5 mg po daily--pt agreeable and will f/u via portal  DASH  "

## 2023-05-03 NOTE — ASSESSMENT & PLAN NOTE
Very mild but present  Could be 2/2 amlodipine and some deconditioning due to recent hospitalization  Pt defers vascular studies for now. Will monitor and notify me with updates via portal

## 2023-05-22 ENCOUNTER — HOSPITAL ENCOUNTER (OUTPATIENT)
Dept: RADIOLOGY | Facility: HOSPITAL | Age: 73
Discharge: HOME | End: 2023-05-22
Attending: NURSE PRACTITIONER

## 2023-05-22 ENCOUNTER — TELEPHONE (OUTPATIENT)
Dept: INTERNAL MEDICINE | Facility: CLINIC | Age: 73
End: 2023-05-22

## 2023-05-22 DIAGNOSIS — K80.20 CALCULUS OF GALLBLADDER WITHOUT CHOLECYSTITIS WITHOUT OBSTRUCTION: Primary | ICD-10-CM

## 2023-05-22 DIAGNOSIS — R74.01 TRANSAMINITIS: ICD-10-CM

## 2023-05-22 PROCEDURE — 76705 ECHO EXAM OF ABDOMEN: CPT | Mod: TC

## 2023-05-22 NOTE — TELEPHONE ENCOUNTER
Please inform patient that abd US revealed: Cholelithiasis. She has a gallstone. I can refer to Sx if she's interested in a surgical consult. Otherwise, I would avoid irritating substances, especially foods high in fat or fried. She needs to go to the ER for any acute abd complaints.

## 2023-05-24 ENCOUNTER — HOSPITAL ENCOUNTER (OUTPATIENT)
Dept: RADIOLOGY | Facility: HOSPITAL | Age: 73
Discharge: HOME | End: 2023-05-24

## 2023-05-24 ENCOUNTER — HOSPITAL ENCOUNTER (OUTPATIENT)
Dept: CARDIOLOGY | Facility: HOSPITAL | Age: 73
Discharge: HOME | End: 2023-05-24

## 2023-05-24 VITALS — HEART RATE: 86 BPM | DIASTOLIC BLOOD PRESSURE: 84 MMHG | SYSTOLIC BLOOD PRESSURE: 140 MMHG | RESPIRATION RATE: 20 BRPM

## 2023-05-24 DIAGNOSIS — R07.9 CHEST PAIN: ICD-10-CM

## 2023-05-24 LAB
CV STRESS BASE HR: 86 BPM
DIASTOLIC BLOOD PRESSURE: 84 MMHG
NUC REST EJECTION FRACTION: 86
NUC STRESS EJECTION FRACTION: 89 %
OHS CV CPX 85 PERCENT MAX PREDICTED HEART RATE MALE: 123
OHS CV CPX ESTIMATED METS: 7
OHS CV CPX MAX PREDICTED HEART RATE: 144
OHS CV CPX PATIENT IS FEMALE: 1
OHS CV CPX PATIENT IS MALE: 0
OHS CV CPX PEAK DIASTOLIC BLOOD PRESSURE: 81 MMHG
OHS CV CPX PEAK HEAR RATE: 139 BPM
OHS CV CPX PEAK RATE PRESSURE PRODUCT: NORMAL
OHS CV CPX PEAK SYSTOLIC BLOOD PRESSURE: 176 MMHG
OHS CV CPX PERCENT MAX PREDICTED HEART RATE ACHIEVED: 96
OHS CV CPX RATE PRESSURE PRODUCT PRESENTING: NORMAL
STRESS ECHO POST EXERCISE DUR MIN: 4 MINUTES
STRESS ECHO POST EXERCISE DUR SEC: 30 SECONDS
SYSTOLIC BLOOD PRESSURE: 140 MMHG

## 2023-05-24 PROCEDURE — A9502 TC99M TETROFOSMIN: HCPCS

## 2023-05-24 PROCEDURE — 78452 HT MUSCLE IMAGE SPECT MULT: CPT

## 2023-05-24 PROCEDURE — 93017 CV STRESS TEST TRACING ONLY: CPT

## 2023-05-30 ENCOUNTER — OFFICE VISIT (OUTPATIENT)
Dept: SURGERY | Facility: CLINIC | Age: 73
End: 2023-05-30

## 2023-05-30 VITALS
SYSTOLIC BLOOD PRESSURE: 116 MMHG | HEIGHT: 63 IN | RESPIRATION RATE: 20 BRPM | BODY MASS INDEX: 38.06 KG/M2 | WEIGHT: 214.81 LBS | OXYGEN SATURATION: 97 % | HEART RATE: 77 BPM | TEMPERATURE: 98 F | DIASTOLIC BLOOD PRESSURE: 83 MMHG

## 2023-05-30 DIAGNOSIS — K80.20 CALCULUS OF GALLBLADDER WITHOUT CHOLECYSTITIS WITHOUT OBSTRUCTION: ICD-10-CM

## 2023-05-30 PROCEDURE — 99215 OFFICE O/P EST HI 40 MIN: CPT | Mod: PBBFAC

## 2023-05-30 NOTE — PROGRESS NOTES
[facility] Note    HPI: 72F who presents for FU of episodic epigastric pain that radiates to the back associated with eating and cholelithiasis on US. She has a PMH of COPD, HTN, and CKD stage 3. The episodes of epigastric pain occur roughly once every two weeks and are associated with nausea and vomiting. The pain lasts for 30mins per episode.  She denies fever, constipation, diarrhea. She was seen in the ED an evaluated for cardiac cause of her pain.  Workup including nuclear stress test has been negative.  She has a history of COPD and was recently started on trilogy.  She uses O2 at night (2L).    Past surgical history includes R kidney donation and hysterectomy with oophorectomy.   Patient previously worked as a histologist.   Former smoker.     PMH:   Past Medical History:   Diagnosis Date    Anxiety disorder, unspecified     CKD (chronic kidney disease)     COPD (chronic obstructive pulmonary disease)     Dyslipidemia     History of radical nephrectomy     HTN (hypertension)     DARRON (obstructive sleep apnea)       Meds:   Current Outpatient Medications:     albuterol (PROVENTIL/VENTOLIN HFA) 90 mcg/actuation inhaler, Inhale 1 puff into the lungs every 6 (six) hours as needed., Disp: , Rfl:     albuterol-ipratropium (DUO-NEB) 2.5 mg-0.5 mg/3 mL nebulizer solution, Take 3 mLs by nebulization 4 (four) times daily as needed for Shortness of Breath or Wheezing., Disp: 75 mL, Rfl: 6    amLODIPine (NORVASC) 5 MG tablet, Take 2 tablets (10 mg total) by mouth once daily., Disp: 90 tablet, Rfl: 3    benzonatate (TESSALON) 100 MG capsule, Take 1 capsule (100 mg total) by mouth 3 (three) times daily as needed for Cough., Disp: 30 capsule, Rfl: 1    calcium carbonate (OS-GINGER) 500 mg calcium (1,250 mg) tablet, Take 1 tablet by mouth 2 (two) times daily., Disp: , Rfl:     cetirizine (ZYRTEC) 10 MG tablet, Take 10 mg by mouth Daily., Disp: , Rfl:     diclofenac sodium (VOLTAREN) 1 % Gel, Apply topically 4  (four) times daily as needed., Disp: , Rfl:     fluticasone-umeclidin-vilanter (TRELEGY ELLIPTA) 100-62.5-25 mcg DsDv, Inhale 1 puff into the lungs once daily., Disp: 60 each, Rfl: 6    losartan (COZAAR) 50 MG tablet, Take 1 tablet (50 mg total) by mouth once daily., Disp: 90 tablet, Rfl: 1    omeprazole (PRILOSEC) 20 MG capsule, Take 1 capsule (20 mg total) by mouth once daily., Disp: 90 capsule, Rfl: 1    rosuvastatin (CRESTOR) 40 MG Tab, Take 1 tablet (40 mg total) by mouth every evening., Disp: 90 tablet, Rfl: 3  Allergies: Review of patient's allergies indicates:  No Known Allergies  Social History:   Social History     Tobacco Use    Smoking status: Former     Types: Cigarettes     Quit date: 2017     Years since quittin.4     Passive exposure: Current    Smokeless tobacco: Never   Substance Use Topics    Alcohol use: Not Currently    Drug use: Never     Family History:   Family History   Problem Relation Age of Onset    Cancer Mother     Heart failure Father     Heart attack Father      Surgical History:   Past Surgical History:   Procedure Laterality Date    bilateral oophrectomy      CATARACT EXTRACTION      CATARACT EXTRACTION W/ INTRAOCULAR LENS IMPLANT Right 2021    CATARACT EXTRACTION W/ INTRAOCULAR LENS IMPLANT Left 2021    EYE SURGERY  2021    cataract    HYSTERECTOMY         Review of Systems:  Skin: No rashes or itching.  Respiratory: Shortness of breath or chest pain  Cardiac: Denies palpitations or swelling in hands/feet.  Gastrointestinal: N and V associated with episodes of abdominal pain. Denies constipation    Objective:    Vitals:  Vitals:    23 1216   BP: 116/83   Pulse: 77   Resp: 20   Temp: 97.9 °F (36.6 °C)        Physical Exam:  Gen: NAD  Neuro: awake, alert, answering questions appropriately  CV: RRR  Resp: non-labored breathing, RADHA  Abd: soft, mild ttp in the RUQ, ND, NT  Ext: moves all 4 spontaneously and purposefully  Skin: R subcostal incision      Imaging:  Visualized portion of the pancreas is normal.  IVC patent.  Gallbladder demonstrates cholelithiasis layering dependently.  No gallbladder wall thickening or sonographic Diaz's sign.  Common bile duct 4 mm.  Liver measures 14.2 cm and demonstrates normal echogenicity.  No focal lesion.  Right kidney removed.    Assessment/Plan:  72 F with post prandial episodes of epigastric pain radiating to the back in the setting of cholelithiasis.      - FU cardiology clearance for surgery   - FU pulmonary clearance for surgery- history of COPD on Trelegy, no use of rescue inhaler in over 1 year, 2L O2 at night   - Will need to fu in clinic once medical and cardiac clearance obtained to chedule for lap kole  - ED precautions discussed.    Patient seen and evaluated with Maki Nathan [facility] 3. Note edited as needed.    HERNANDO Hernandez5  [facility]

## 2023-06-01 NOTE — PROGRESS NOTES
I have reviewed the notes, assessments, and/or procedures performed by the resident, I concur with her/his documentation of Marisel Resendez.     Deysi Telles MD

## 2023-06-02 ENCOUNTER — TELEPHONE (OUTPATIENT)
Dept: INTERNAL MEDICINE | Facility: CLINIC | Age: 73
End: 2023-06-02

## 2023-06-02 ENCOUNTER — OFFICE VISIT (OUTPATIENT)
Dept: INTERNAL MEDICINE | Facility: CLINIC | Age: 73
End: 2023-06-02

## 2023-06-02 VITALS
OXYGEN SATURATION: 96 % | TEMPERATURE: 98 F | WEIGHT: 215.81 LBS | SYSTOLIC BLOOD PRESSURE: 113 MMHG | DIASTOLIC BLOOD PRESSURE: 78 MMHG | BODY MASS INDEX: 38.24 KG/M2 | RESPIRATION RATE: 20 BRPM | HEART RATE: 79 BPM | HEIGHT: 63 IN

## 2023-06-02 DIAGNOSIS — I10 PRIMARY HYPERTENSION: Primary | ICD-10-CM

## 2023-06-02 PROCEDURE — 99215 OFFICE O/P EST HI 40 MIN: CPT | Mod: PBBFAC | Performed by: INTERNAL MEDICINE

## 2023-06-02 NOTE — PROGRESS NOTES
"[facility] INTERNAL MEDICINE  OUTPATIENT Preop OFFICE VISIT NOTE    SUBJECTIVE:      HPI: Marisel Resendez is a 72 y.o. female w/ PMH of HTN,COPD,GERD and hyperlipidemia being scheduled for lap kole.Pt had  nml recent Cxr and normal Nuclear stress test in 4/29/23.She is an Ex smoker and quit 6 yrs ago.EKG nml    ROS:  (+)  (-) Chest pain, palpitations, SOB, dizziness, syncope, edema, PND, orthopnea, claudication, cough, fever, chills, abdominal pain, vomiting, diarrhea, dysuria, bleeding    OBJECTIVE:     Vital signs:   /78 (BP Location: Left arm, Patient Position: Sitting, BP Method: Large (Automatic))   Pulse 79   Temp 97.7 °F (36.5 °C) (Oral)   Resp 20   Ht 5' 3" (1.6 m)   Wt 97.9 kg (215 lb 12.8 oz)   SpO2 96%   BMI 38.23 kg/m²      Physical Examination:  General:  Well-nourished, well-developed, no acute distress  HEENT: Normocephalic, atraumatic. EOMI.  MMM  Neck: Supple. No obvious JVD.  Normal range of motion.  Respiratory: CTAB.  No obvious crackles wheeze or rhonchi.  Cardiovascular:  RRR.  No obvious M/G/R. Warm extremities.  Peripheral pulses intact.  No edema.  Gastrointestinal:  Soft, nontender, nondistended.  Normal bowel sounds.  Neurologic: ANOx3.  Answering questions/following commands appropriately.  No FND      Current Outpatient Medications:     albuterol (PROVENTIL/VENTOLIN HFA) 90 mcg/actuation inhaler, Inhale 1 puff into the lungs every 6 (six) hours as needed., Disp: , Rfl:     albuterol-ipratropium (DUO-NEB) 2.5 mg-0.5 mg/3 mL nebulizer solution, Take 3 mLs by nebulization 4 (four) times daily as needed for Shortness of Breath or Wheezing., Disp: 75 mL, Rfl: 6    amLODIPine (NORVASC) 5 MG tablet, Take 2 tablets (10 mg total) by mouth once daily., Disp: 90 tablet, Rfl: 3    benzonatate (TESSALON) 100 MG capsule, Take 1 capsule (100 mg total) by mouth 3 (three) times daily as needed for Cough., Disp: 30 capsule, Rfl: 1    calcium carbonate (OS-GINGER) 500 mg calcium (1,250 mg) tablet, " Take 1 tablet by mouth 2 (two) times daily., Disp: , Rfl:     cetirizine (ZYRTEC) 10 MG tablet, Take 10 mg by mouth Daily., Disp: , Rfl:     diclofenac sodium (VOLTAREN) 1 % Gel, Apply topically 4 (four) times daily as needed., Disp: , Rfl:     fluticasone-umeclidin-vilanter (TRELEGY ELLIPTA) 100-62.5-25 mcg DsDv, Inhale 1 puff into the lungs once daily., Disp: 60 each, Rfl: 6    losartan (COZAAR) 50 MG tablet, Take 1 tablet (50 mg total) by mouth once daily., Disp: 90 tablet, Rfl: 1    omeprazole (PRILOSEC) 20 MG capsule, Take 1 capsule (20 mg total) by mouth once daily., Disp: 90 capsule, Rfl: 1    rosuvastatin (CRESTOR) 40 MG Tab, Take 1 tablet (40 mg total) by mouth every evening., Disp: 90 tablet, Rfl: 3     ASSESSMENT & PLAN:   1.HTN- well controlled  2.COPD-On albuterol and Trelegy. Currenty asx. Stable  3.Hyperlipidemia- on statin  4.GERD- on PPI  5.RCRI index 0.Medically stable for surgery.    Adonay Hoang MD

## 2023-07-13 ENCOUNTER — OFFICE VISIT (OUTPATIENT)
Dept: CARDIOLOGY | Facility: CLINIC | Age: 73
End: 2023-07-13

## 2023-07-13 VITALS
SYSTOLIC BLOOD PRESSURE: 124 MMHG | OXYGEN SATURATION: 98 % | WEIGHT: 212.75 LBS | HEIGHT: 63 IN | HEART RATE: 84 BPM | RESPIRATION RATE: 17 BRPM | BODY MASS INDEX: 37.7 KG/M2 | DIASTOLIC BLOOD PRESSURE: 79 MMHG

## 2023-07-13 DIAGNOSIS — R07.9 CHEST PAIN, UNSPECIFIED TYPE: ICD-10-CM

## 2023-07-13 DIAGNOSIS — I10 PRIMARY HYPERTENSION: Primary | ICD-10-CM

## 2023-07-13 DIAGNOSIS — R07.9 CHEST PAIN: ICD-10-CM

## 2023-07-13 DIAGNOSIS — Z01.810 PREOP CARDIOVASCULAR EXAM: ICD-10-CM

## 2023-07-13 PROCEDURE — 99215 OFFICE O/P EST HI 40 MIN: CPT | Mod: PBBFAC | Performed by: INTERNAL MEDICINE

## 2023-07-13 RX ORDER — AMLODIPINE BESYLATE 10 MG/1
10 TABLET ORAL DAILY
Qty: 90 TABLET | Refills: 3 | Status: SHIPPED | OUTPATIENT
Start: 2023-07-13 | End: 2023-07-31

## 2023-07-13 NOTE — PROGRESS NOTES
[facility] [place] [facility]  Cardiology Clinic      Date of Visit: 7/13/2023c  Reason for Visit/Chief Complaint:  Preoperative risk assessment    The patient was discussed with Dr. Rojas/Puma who agrees with the assessment and plan.        History of Present Illness:        Marisel Resendez is a 72 y.o. female with a PMHx HTN, HLD, CKD3, Solitary Kidney (kidney donor), COPD and DARRON who presents to cardiology clinic for evaluation of preop risk assessment.  Patient scheduled for elective laparoscopic cholecystectomy which General surgery and presents to cardiology clinic for preoperative risk assessment.  Patient has recently diagnosed hypertension on amlodipine as well controlled.  She is able to do housework and yd work without exertional dyspnea and can walk greater than 6 blocks without having to stop for fatigue.  She has a recent admission for hypertensive crisis with sudden onset chest pain and shortness of breath that was controlled with oral antihypertensives.  She denies fevers, chills, nausea, vomiting, chest pain, shortness of breath, abdominal pain, lower extremity edema, syncope, palpitations, dyspnea on exertion.      PMHx: HTN, HLD, CKD Stg III, Solitary Kidney (kidney donor), COPD and DARRON    SurgicalHx:  DEYVI, bilateral oophorectomy, cataracts   FamilyHx: family history includes Cancer in her mother; Heart attack in her father; Heart failure in her father.   SocialHx: - EtOH, former smoking, - illicit's.    Allergies: NDKA  Home Medications:  Crestor 40, amlodipine 10, Trelegy Ellipta, albuterol, Prilosec              Objective:     Wt Readings from Last 3 Encounters:   07/13/23 96.5 kg (212 lb 11.9 oz)   06/02/23 97.9 kg (215 lb 12.8 oz)   05/30/23 97.4 kg (214 lb 12.8 oz)     Temp Readings from Last 3 Encounters:   06/02/23 97.7 °F (36.5 °C) (Oral)   05/30/23 97.9 °F (36.6 °C) (Oral)   05/03/23 98.1 °F (36.7 °C) (Oral)     BP Readings from Last  "3 Encounters:   07/13/23 124/79   06/02/23 113/78   05/30/23 116/83     Pulse Readings from Last 3 Encounters:   07/13/23 84   06/02/23 79   05/30/23 77       Vitals:    07/13/23 0820   BP: 124/79   BP Method: Medium (Automatic)   Pulse: 84   Resp: 17   SpO2: 98%   Weight: 96.5 kg (212 lb 11.9 oz)   Height: 5' 3" (1.6 m)     Body mass index is 37.69 kg/m².    Physical Exam  Vitals and nursing note reviewed.   Constitutional:       Appearance: Normal appearance. She is not ill-appearing.   Cardiovascular:      Rate and Rhythm: Normal rate and regular rhythm.      Pulses: Normal pulses.      Heart sounds: Normal heart sounds. No murmur heard.  Pulmonary:      Effort: Pulmonary effort is normal. No respiratory distress.      Breath sounds: Normal breath sounds. No rales.      Comments: Mild Expiratory wheezing  Chest:      Chest wall: No tenderness.   Abdominal:      General: Bowel sounds are normal.      Palpations: Abdomen is soft.      Tenderness: There is no abdominal tenderness. There is no guarding or rebound.   Musculoskeletal:         General: Normal range of motion.      Right lower leg: No edema.      Left lower leg: No edema.   Skin:     General: Skin is warm and dry.      Capillary Refill: Capillary refill takes less than 2 seconds.   Neurological:      Mental Status: She is alert.        Labs:   I have reviewed the following labs below:      CBC:  Lab Results   Component Value Date    WBC 9.8 04/29/2023    HGB 13.6 04/29/2023    HCT 41.8 04/29/2023     04/29/2023    MCV 99.5 (H) 04/29/2023    RDW 12.5 04/29/2023     BMP:  Lab Results   Component Value Date     05/22/2023    K 5.0 05/22/2023    CO2 28 05/22/2023    BUN 29.0 (H) 05/22/2023    CALCIUM 9.8 05/22/2023    MG 2.1 11/08/2018    PHOS 3.0 11/08/2018     LFTs:  Lab Results   Component Value Date    ALBUMIN 4.1 05/22/2023    BILITOT 0.4 05/22/2023    AST 25 05/22/2023    ALKPHOS 128 05/22/2023    ALT 22 05/22/2023    GGT 28 01/11/2019 "     FLP:  Cholesterol Total   Date Value Ref Range Status   04/24/2023 142 <=200 mg/dL Final     HDL Cholesterol   Date Value Ref Range Status   04/24/2023 52 35 - 60 mg/dL Final     LDL Cholesterol   Date Value Ref Range Status   04/24/2023 72.00 50.00 - 140.00 mg/dL Final     Triglyceride   Date Value Ref Range Status   04/24/2023 89 37 - 140 mg/dL Final     DM:  Lab Results   Component Value Date    HGBA1C 5.5 07/21/2022    HGBA1C 5.7 12/10/2018    CREATININE 1.57 (H) 05/22/2023     Thyroid:  Lab Results   Component Value Date    TSH 1.922 01/24/2023     Anemia:  Lab Results   Component Value Date    IRON 84 12/01/2017    TIBC 343 12/01/2017    FERRITIN 68.3 12/01/2017     Coags:No results found for: INR, PROTIME, APTT   Cardiac:  Lab Results   Component Value Date    TROPONINI <0.010 04/29/2023    TROPONINI <0.010 04/29/2023    TROPONINI <0.010 04/28/2023    BNP 44.3 04/28/2023       Cardiovascular Studies/Imaging:   I have reviewed the following studies below:      EKG (2023):  Rhythm, normal axis, no enlargements, normal intervals, no ST-T wave changes indicative of ischemia.    Echo (2023)  Interpretation Summary  · The left ventricle is normal in size with normal systolic function.  · The estimated ejection fraction is 65%.  · Normal left ventricular diastolic function.  · Normal right ventricular size with normal right ventricular systolic function.  · Normal central venous pressure (3 mmHg).  · The estimated PA systolic pressure is 13 mmHg.  · IVC is normal.  · There is no pulmonary hypertension.  · Mild left atrial enlargement.      Stress Testing (5/23):   Interpretation Summary    Normal myocardial perfusion scan. There is no evidence of myocardial ischemia or infarction.    The gated perfusion images showed an ejection fraction of 86% at rest. The gated perfusion images showed an ejection fraction of 89% post stress.    The patient reported no chest pain during the stress test.    There were no  arrhythmias during stress.    The patient exercised for 4 minutes 30 seconds on a Chavez protocol, corresponding to a functional capacity of 7 METS, achieving a peak heart rate of 139 bpm, which is 96 % of the age predicted maximum heart rate.    On the nuclear stress test the EF is supranormal.  If the patient has an echo, the EF on the echo is considered more accurate.    The patient has a low risk nuclear stress test.         Assessment/Plan:     Marisel Resendez is a 72 y.o. female with PMHx HTN, HLD, CKD3, Solitary Kidney (kidney donor), COPD and DARRON who presents for evaluation of preoperative cardiac risk assessment for noncardiac surgery.     Perioperative Cardiovascular Risk Assessment and Management for Noncardiac Procedure/Surgery  - Planned/Proposed Procedure/Surgery:  Laparoscopic cholecystectomy  - Timing of Procedure/Surgery: Elective  - Risk of Procedure/Surgery: Intermediate  - METS: >/=4  - Active Cardiac Conditions: No current or active   - RCRI Score: 0  - RCRI Risk Factors: None  - Other CV Risk Factors: HTN and CKD  - The patient is low risk (<1% risk of MACE) for a Intermediate risk procedure  - No further cardiac workup is indicated at this time    Essential hypertension   Patient likely has renovascular component given solitary kidney, BP at goal this visit (<130/<80)  -continue amlodipine 10 mg p.o. daily (refill provided)    Follow up if symptoms worsen or fail to improve.       Dickson Ferrer M.D.  [facility] Cardiology Fellow, PGY-4  Pager: [phone]  07/13/2023 8:42 AM  Critical access hospital

## 2023-07-18 ENCOUNTER — OFFICE VISIT (OUTPATIENT)
Dept: SURGERY | Facility: CLINIC | Age: 73
End: 2023-07-18

## 2023-07-18 VITALS
HEIGHT: 63 IN | HEART RATE: 89 BPM | DIASTOLIC BLOOD PRESSURE: 76 MMHG | OXYGEN SATURATION: 97 % | RESPIRATION RATE: 18 BRPM | BODY MASS INDEX: 38.24 KG/M2 | SYSTOLIC BLOOD PRESSURE: 121 MMHG | WEIGHT: 215.81 LBS

## 2023-07-18 DIAGNOSIS — K80.50 BILIARY COLIC: Primary | ICD-10-CM

## 2023-07-18 PROCEDURE — 99215 OFFICE O/P EST HI 40 MIN: CPT | Mod: PBBFAC

## 2023-07-18 RX ORDER — CEFAZOLIN SODIUM 2 G/50ML
2 SOLUTION INTRAVENOUS ONCE
Status: CANCELLED | OUTPATIENT
Start: 2023-07-18

## 2023-07-18 RX ORDER — HEPARIN SODIUM 5000 [USP'U]/ML
5000 INJECTION, SOLUTION INTRAVENOUS; SUBCUTANEOUS ONCE
Status: CANCELLED | OUTPATIENT
Start: 2023-07-18

## 2023-07-18 NOTE — PROGRESS NOTES
[facility] Note    HPI:   Pt with a PMH of COPD, CKD, HLD, HTN and DARRON who presents today after obtaining cardiac and medical clearance for cholecystectomy in the setting of biliary colic. She continues to have epigastric and RUQ abd pain after eating fatty foods. Pain is intermittent and actually improved slightly from last visit. No fever, chills, confusion. She is eating well and has no changes to bowel or bladder habits.     PMH:   Past Medical History:   Diagnosis Date    Anxiety disorder, unspecified     CKD (chronic kidney disease)     COPD (chronic obstructive pulmonary disease)     Dyslipidemia     History of radical nephrectomy     HTN (hypertension)     DARRON (obstructive sleep apnea)       Meds:   Current Outpatient Medications:     albuterol (PROVENTIL/VENTOLIN HFA) 90 mcg/actuation inhaler, Inhale 1 puff into the lungs every 6 (six) hours as needed., Disp: , Rfl:     albuterol-ipratropium (DUO-NEB) 2.5 mg-0.5 mg/3 mL nebulizer solution, Take 3 mLs by nebulization 4 (four) times daily as needed for Shortness of Breath or Wheezing., Disp: 75 mL, Rfl: 6    amLODIPine (NORVASC) 10 MG tablet, Take 1 tablet (10 mg total) by mouth once daily., Disp: 90 tablet, Rfl: 3    benzonatate (TESSALON) 100 MG capsule, Take 1 capsule (100 mg total) by mouth 3 (three) times daily as needed for Cough., Disp: 30 capsule, Rfl: 1    calcium carbonate (OS-GINGER) 500 mg calcium (1,250 mg) tablet, Take 1 tablet by mouth 2 (two) times daily., Disp: , Rfl:     cetirizine (ZYRTEC) 10 MG tablet, Take 10 mg by mouth Daily., Disp: , Rfl:     diclofenac sodium (VOLTAREN) 1 % Gel, Apply topically 4 (four) times daily as needed., Disp: , Rfl:     fluticasone-umeclidin-vilanter (TRELEGY ELLIPTA) 100-62.5-25 mcg DsDv, Inhale 1 puff into the lungs once daily., Disp: 60 each, Rfl: 6    losartan (COZAAR) 50 MG tablet, Take 1 tablet (50 mg total) by mouth once daily., Disp: 90 tablet, Rfl: 1    omeprazole (PRILOSEC) 20 MG capsule,  Take 1 capsule (20 mg total) by mouth once daily., Disp: 90 capsule, Rfl: 1    rosuvastatin (CRESTOR) 40 MG Tab, Take 1 tablet (40 mg total) by mouth every evening., Disp: 90 tablet, Rfl: 3  Allergies: Review of patient's allergies indicates:  No Known Allergies  Social History:   Social History     Tobacco Use    Smoking status: Former     Types: Cigarettes     Quit date: 2017     Years since quittin.5     Passive exposure: Current    Smokeless tobacco: Never   Substance Use Topics    Alcohol use: Not Currently    Drug use: Never     Family History:   Family History   Problem Relation Age of Onset    Cancer Mother     Heart failure Father     Heart attack Father      Surgical History:   Past Surgical History:   Procedure Laterality Date    bilateral oophrectomy      CATARACT EXTRACTION      CATARACT EXTRACTION W/ INTRAOCULAR LENS IMPLANT Right 2021    CATARACT EXTRACTION W/ INTRAOCULAR LENS IMPLANT Left 2021    EYE SURGERY  2021    cataract    HYSTERECTOMY       Review of Systems:  Skin: No rashes or itching.  Head: Denies headache or recent trauma.  Eyes: Denies eye pain or double vision.  Neck: Denies swelling or hoarseness of voice.  Respiratory: Denies shortness of breath or chest pain  Cardiac: Denies palpitations or swelling in hands/feet.  Gastrointestinal: Denies nausea, denies vomiting  Urinary: Denies dysuria or hematuria.  Vascular: Denies claudication or leg swelling.  Neuro: Denies motor deficits. Denies weakness.  Endocrine: Denies excessive sweating or cold intolerance.  Psych: Denies memory problems. Denies anxiety.    Objective:    Vitals:  Vitals:    23 1341   BP: 121/76   Pulse: 89   Resp: 18        Physical Exam:  Gen: NAD  Neuro: awake, alert, answering questions appropriately  CV: RRR  Resp: non-labored breathing, RADHA  Abd: soft, ND, tenderness noted in the RUQ  Ext: moves all 4 spontaneously and purposefully  Skin: warm, well perfused    Pertinent Labs:  None  new    Imaging:  EXAMINATION:  US ABDOMEN LIMITED     CLINICAL HISTORY:  Elevation of levels of liver transaminase levels     TECHNIQUE:  Limited ultrasound of the right upper quadrant of the abdomen (including pancreas, liver, gallbladder, common bile duct, and spleen) was performed.     COMPARISON:  02/14/2019     FINDINGS:  Visualized portion of the pancreas is normal.  IVC patent.  Gallbladder demonstrates cholelithiasis layering dependently.  No gallbladder wall thickening or sonographic Diaz's sign.  Common bile duct 4 mm.  Liver measures 14.2 cm and demonstrates normal echogenicity.  No focal lesion.  Right kidney removed.     Impression:     Cholelithiasis.    Micro/Path/Other:  None new    Assessment/Plan:  This is a 73 y/o F presenting after obtaining clearance from cardiology and medical clearance for surgery     - she has been deemed low risk for intermediate risk surgery by cardiology and cleared medically for lap kole  - will plan for the OR on 8/4 for lap kole, consent obtained, preop orders in  - RTC 2 weeks post op    Isrrael Gomez MD   [employer] General Surgery PGY 3  07/18/2023 3:40 PM

## 2023-07-18 NOTE — H&P (VIEW-ONLY)
[facility] Note    HPI:   Pt with a PMH of COPD, CKD, HLD, HTN and DARRON who presents today after obtaining cardiac and medical clearance for cholecystectomy in the setting of biliary colic. She continues to have epigastric and RUQ abd pain after eating fatty foods. Pain is intermittent and actually improved slightly from last visit. No fever, chills, confusion. She is eating well and has no changes to bowel or bladder habits.     PMH:   Past Medical History:   Diagnosis Date    Anxiety disorder, unspecified     CKD (chronic kidney disease)     COPD (chronic obstructive pulmonary disease)     Dyslipidemia     History of radical nephrectomy     HTN (hypertension)     DARRON (obstructive sleep apnea)       Meds:   Current Outpatient Medications:     albuterol (PROVENTIL/VENTOLIN HFA) 90 mcg/actuation inhaler, Inhale 1 puff into the lungs every 6 (six) hours as needed., Disp: , Rfl:     albuterol-ipratropium (DUO-NEB) 2.5 mg-0.5 mg/3 mL nebulizer solution, Take 3 mLs by nebulization 4 (four) times daily as needed for Shortness of Breath or Wheezing., Disp: 75 mL, Rfl: 6    amLODIPine (NORVASC) 10 MG tablet, Take 1 tablet (10 mg total) by mouth once daily., Disp: 90 tablet, Rfl: 3    benzonatate (TESSALON) 100 MG capsule, Take 1 capsule (100 mg total) by mouth 3 (three) times daily as needed for Cough., Disp: 30 capsule, Rfl: 1    calcium carbonate (OS-GINGER) 500 mg calcium (1,250 mg) tablet, Take 1 tablet by mouth 2 (two) times daily., Disp: , Rfl:     cetirizine (ZYRTEC) 10 MG tablet, Take 10 mg by mouth Daily., Disp: , Rfl:     diclofenac sodium (VOLTAREN) 1 % Gel, Apply topically 4 (four) times daily as needed., Disp: , Rfl:     fluticasone-umeclidin-vilanter (TRELEGY ELLIPTA) 100-62.5-25 mcg DsDv, Inhale 1 puff into the lungs once daily., Disp: 60 each, Rfl: 6    losartan (COZAAR) 50 MG tablet, Take 1 tablet (50 mg total) by mouth once daily., Disp: 90 tablet, Rfl: 1    omeprazole (PRILOSEC) 20 MG capsule,  Take 1 capsule (20 mg total) by mouth once daily., Disp: 90 capsule, Rfl: 1    rosuvastatin (CRESTOR) 40 MG Tab, Take 1 tablet (40 mg total) by mouth every evening., Disp: 90 tablet, Rfl: 3  Allergies: Review of patient's allergies indicates:  No Known Allergies  Social History:   Social History     Tobacco Use    Smoking status: Former     Types: Cigarettes     Quit date: 2017     Years since quittin.5     Passive exposure: Current    Smokeless tobacco: Never   Substance Use Topics    Alcohol use: Not Currently    Drug use: Never     Family History:   Family History   Problem Relation Age of Onset    Cancer Mother     Heart failure Father     Heart attack Father      Surgical History:   Past Surgical History:   Procedure Laterality Date    bilateral oophrectomy      CATARACT EXTRACTION      CATARACT EXTRACTION W/ INTRAOCULAR LENS IMPLANT Right 2021    CATARACT EXTRACTION W/ INTRAOCULAR LENS IMPLANT Left 2021    EYE SURGERY  2021    cataract    HYSTERECTOMY       Review of Systems:  Skin: No rashes or itching.  Head: Denies headache or recent trauma.  Eyes: Denies eye pain or double vision.  Neck: Denies swelling or hoarseness of voice.  Respiratory: Denies shortness of breath or chest pain  Cardiac: Denies palpitations or swelling in hands/feet.  Gastrointestinal: Denies nausea, denies vomiting  Urinary: Denies dysuria or hematuria.  Vascular: Denies claudication or leg swelling.  Neuro: Denies motor deficits. Denies weakness.  Endocrine: Denies excessive sweating or cold intolerance.  Psych: Denies memory problems. Denies anxiety.    Objective:    Vitals:  Vitals:    23 1341   BP: 121/76   Pulse: 89   Resp: 18        Physical Exam:  Gen: NAD  Neuro: awake, alert, answering questions appropriately  CV: RRR  Resp: non-labored breathing, RADHA  Abd: soft, ND, tenderness noted in the RUQ  Ext: moves all 4 spontaneously and purposefully  Skin: warm, well perfused    Pertinent Labs:  None  new    Imaging:  EXAMINATION:  US ABDOMEN LIMITED     CLINICAL HISTORY:  Elevation of levels of liver transaminase levels     TECHNIQUE:  Limited ultrasound of the right upper quadrant of the abdomen (including pancreas, liver, gallbladder, common bile duct, and spleen) was performed.     COMPARISON:  02/14/2019     FINDINGS:  Visualized portion of the pancreas is normal.  IVC patent.  Gallbladder demonstrates cholelithiasis layering dependently.  No gallbladder wall thickening or sonographic Diaz's sign.  Common bile duct 4 mm.  Liver measures 14.2 cm and demonstrates normal echogenicity.  No focal lesion.  Right kidney removed.     Impression:     Cholelithiasis.    Micro/Path/Other:  None new    Assessment/Plan:  This is a 71 y/o F presenting after obtaining clearance from cardiology and medical clearance for surgery     - she has been deemed low risk for intermediate risk surgery by cardiology and cleared medically for lap kole  - will plan for the OR on 8/4 for lap kole, consent obtained, preop orders in  - RTC 2 weeks post op    Isrrael Gomez MD   [employer] General Surgery PGY 3  07/18/2023 3:40 PM

## 2023-07-20 NOTE — PROGRESS NOTES
I have reviewed the notes, assessments, and/or procedures performed by Jason, I concur with her/his documentation of Marisel Resendez.

## 2023-07-24 ENCOUNTER — LAB VISIT (OUTPATIENT)
Dept: LAB | Facility: HOSPITAL | Age: 73
End: 2023-07-24
Attending: NURSE PRACTITIONER

## 2023-07-24 DIAGNOSIS — N18.32 CKD STAGE G3B/A1, GFR 30-44 AND ALBUMIN CREATININE RATIO <30 MG/G: ICD-10-CM

## 2023-07-24 LAB
ALBUMIN SERPL-MCNC: 4 G/DL (ref 3.4–4.8)
ALBUMIN/GLOB SERPL: 1.3 RATIO (ref 1.1–2)
ALP SERPL-CCNC: 125 UNIT/L (ref 40–150)
ALT SERPL-CCNC: 28 UNIT/L (ref 0–55)
AST SERPL-CCNC: 31 UNIT/L (ref 5–34)
BILIRUBIN DIRECT+TOT PNL SERPL-MCNC: 0.5 MG/DL
BUN SERPL-MCNC: 23.4 MG/DL (ref 9.8–20.1)
CALCIUM SERPL-MCNC: 9.4 MG/DL (ref 8.4–10.2)
CHLORIDE SERPL-SCNC: 109 MMOL/L (ref 98–107)
CO2 SERPL-SCNC: 25 MMOL/L (ref 23–31)
CREAT SERPL-MCNC: 1.33 MG/DL (ref 0.55–1.02)
GFR SERPLBLD CREATININE-BSD FMLA CKD-EPI: 43 MLS/MIN/1.73/M2
GLOBULIN SER-MCNC: 3.1 GM/DL (ref 2.4–3.5)
GLUCOSE SERPL-MCNC: 100 MG/DL (ref 82–115)
POTASSIUM SERPL-SCNC: 4.5 MMOL/L (ref 3.5–5.1)
PROT SERPL-MCNC: 7.1 GM/DL (ref 5.8–7.6)
SODIUM SERPL-SCNC: 141 MMOL/L (ref 136–145)

## 2023-07-24 PROCEDURE — 36415 COLL VENOUS BLD VENIPUNCTURE: CPT

## 2023-07-24 PROCEDURE — 80053 COMPREHEN METABOLIC PANEL: CPT

## 2023-07-25 ENCOUNTER — OFFICE VISIT (OUTPATIENT)
Dept: INTERNAL MEDICINE | Facility: CLINIC | Age: 73
End: 2023-07-25

## 2023-07-25 VITALS
DIASTOLIC BLOOD PRESSURE: 67 MMHG | WEIGHT: 219.19 LBS | TEMPERATURE: 98 F | BODY MASS INDEX: 40.33 KG/M2 | HEART RATE: 86 BPM | SYSTOLIC BLOOD PRESSURE: 101 MMHG | RESPIRATION RATE: 20 BRPM | HEIGHT: 62 IN

## 2023-07-25 DIAGNOSIS — G47.33 OBSTRUCTIVE SLEEP APNEA SYNDROME: ICD-10-CM

## 2023-07-25 DIAGNOSIS — J44.9 CHRONIC OBSTRUCTIVE PULMONARY DISEASE, UNSPECIFIED COPD TYPE: ICD-10-CM

## 2023-07-25 DIAGNOSIS — Z12.31 ENCOUNTER FOR SCREENING MAMMOGRAM FOR MALIGNANT NEOPLASM OF BREAST: ICD-10-CM

## 2023-07-25 DIAGNOSIS — R60.0 BILATERAL LEG EDEMA: ICD-10-CM

## 2023-07-25 DIAGNOSIS — N18.32 STAGE 3B CHRONIC KIDNEY DISEASE: ICD-10-CM

## 2023-07-25 DIAGNOSIS — Z00.00 WELLNESS EXAMINATION: Primary | ICD-10-CM

## 2023-07-25 DIAGNOSIS — J45.909 ASTHMA, UNSPECIFIED ASTHMA SEVERITY, UNSPECIFIED WHETHER COMPLICATED, UNSPECIFIED WHETHER PERSISTENT: ICD-10-CM

## 2023-07-25 DIAGNOSIS — I10 PRIMARY HYPERTENSION: ICD-10-CM

## 2023-07-25 DIAGNOSIS — Z13.1 SCREENING FOR DIABETES MELLITUS: ICD-10-CM

## 2023-07-25 DIAGNOSIS — E78.2 MIXED HYPERLIPIDEMIA: ICD-10-CM

## 2023-07-25 PROCEDURE — 99214 OFFICE O/P EST MOD 30 MIN: CPT | Mod: S$PBB,,, | Performed by: NURSE PRACTITIONER

## 2023-07-25 PROCEDURE — 99215 OFFICE O/P EST HI 40 MIN: CPT | Mod: PBBFAC | Performed by: NURSE PRACTITIONER

## 2023-07-25 PROCEDURE — 99214 PR OFFICE/OUTPT VISIT, EST, LEVL IV, 30-39 MIN: ICD-10-PCS | Mod: S$PBB,,, | Performed by: NURSE PRACTITIONER

## 2023-07-25 NOTE — ASSESSMENT & PLAN NOTE
Chronic. No new or worsening. Monitor CCB therapy  Leg elevated, low sodium diet  Echo: Echo (2023)  Interpretation Summary  · The left ventricle is normal in size with normal systolic function.  · The estimated ejection fraction is 65%.  · Normal left ventricular diastolic function.  · Normal right ventricular size with normal right ventricular systolic function.  · Normal central venous pressure (3 mmHg).  · The estimated PA systolic pressure is 13 mmHg.  · IVC is normal.  · There is no pulmonary hypertension.  · Mild left atrial enlargement.

## 2023-07-25 NOTE — ASSESSMENT & PLAN NOTE
Doing much better on Trelegy. Continue.  Continue Devin PRN  Will tx acute exacerbation w/ Z-pack and short course of Prednisone  O2 sat 97% on RA at this time  ED precautions for worsening sx or decreased saturation < 90%

## 2023-07-25 NOTE — PROGRESS NOTES
"BIRGIT Stafford   [facility]  [address] [id number]      PATIENT NAME: Marisel Resendez  : 1950  DATE: 23  MRN: [id number]        Reason for Visit / Chief Complaint: Follow-up (Result review)       History of Present Illness / Problem Focused Workflow     Marisel Resendez presents to the clinic with Follow-up (Result review)     Initial Visit (18): 68 y.o.  female presenting to the clinic to re-establish primary care. Previous PCP MELLY Delatorre NP. PMHx significant for HTN, HLD, CKD Stg III, Solitary Kidney (kidney donor), COPD and DARRON. Seen in Ortho Clinic in 2018 for R knee pain. Bp at goal today. Currently taking Losartan 25 mg po daily. Denies ADR. No refills needed at this time. FLP needed. LDL 78 (2017). Currently taking Atorvastatin 40 mg po daily. Tolerating well. Following Renal Clinic for CKD Stg III. Last OV 2018. Renal indices stable. COPD managed with Advair daily and Duonebs/ProAir inhaler as needed. She reports that she was hospitalized over the past year with pneumonia. She was given Spiriva in addition to her current medications. She does not take the Spiriva every day, only "when I feel I need it." Occasional cough with clear sputum. This is pt's norm. Reports taking Mucinex as needed and using spirometer as previously instructed. Hx of DARRON. Does not use CPAP d/t inability to tolerate. Sleeps with O2 qHS and reports awakening feeling "good and energized." Pt did express that she wanted to mention some forgetfulness lately. Admits occasionally beginning a task and forgetting to complete task. Has pulled out medication before but forgot to take it. Overall, she states, "I'm not concerned about it. I understand that I'm getting older. I just thought I would mention it so that we can monitor it." She is also concerned about a dry, erythemic patch to left side of nasal bridge that has " "been present for many years. She is concerned because she reports heavy sun exposure in younger years with inadequate sun protection. She would like region "checked out." Overall, pt reports that she feels "good." No longer a tobacco user. Uses tobacco creams/ointments PRN for psoriasis flares. States flares are rare. Denies fever, chills, HA, CP, SOB, Abd pain, Dysuria, B/B dysfunction, or any other concerns today.     5/8/19: 68 y.o.  female with PMHx significant for HTN, HLD, CKD Stg III, Solitary Kidney (kidney donor), COPD and DARRON, presenting for routine f/u. Bp at goal today. Currently taking Losartan 25 mg po daily. Renal indices stable. HgA1c 6.0%. . Pt recently had top teeth removed and dentures placed. States that she ate 1 cup of ice cream for an entire week. Pt believes this to be cause of elevated LDL. Plans to resume previous low-fat diet. Taking Atorvastatin 40 mg po daily. Tolerating well. Following Renal Clinic for CKD Stg III. Renal indices have been stable. Seen by Renal this am. Scheduled to f/u in 6 months. Hx of gallstones. Denies any recent abd complaints. COPD managed with Advair daily and Duonebs/ProAir inhaler as needed. Using Spiriva infrequently. Requesting med refills. Previously prescribed Fioricet for migraines. Reports significant improvement in migraines. Very pleased with medication effectiveness. Overall, pt reports that she's been feeling "great." Denies fever, chills, HA, CP, SOB, productive cough, Abd pain, Dysuria, B/B dysfunction, or any other concerns today.     (11/8/19): Ms. Joiner is presenting for a 6-mth f/u. PMHx significant for HTN, HLD, CKD Stg III, Solitary Kidney (kidney donor), COPD and DARRON. She was seen in Renal Clinic 10/9/19. Renal indices stable. She will return to Renal clinic in 6 mths. Alk remains slightly elevated. Hx of gallstones without any acute complaints. Bp at goal. HgA1c 5.9%. FLP WNL; LDL 58. COPD managed with Advair twice a day, " Spiriva as needed, and DuoNebs/Albuterol inhaler PRN. Mammo 10/25/19 negative. Bone Density 6/2018 WNL. Denies fever, chills, HA, CP, SOB, productive cough at present, Abd pain, Dysuria, B/B dysfunction, or any other concerns today.     ED F/u 1/6/20: Ms. Joiner is presenting for an ED f/u. PMHx significant for HTN, HLD, CKD Stg III, Solitary Kidney (kidney donor), COPD and DARRON. She presented to ED 12/30/19 with c/o cough, chills, and generalized body aches x 3 days. She thought she had the flu. Flu swab was negative. She was diagnosed with PNA. CXR revealed: Small, patchy opacity in the left lower lung may relate to atelectasis or pneumonia. She was given prescriptions for codeine-guaifenesin 10mg-100mg/5mL 5mL q6h prn cough and Levaquin 750 mg po daily x 5 days. She's completed the abx. Still using the cough syrup stating that cough and SOB with exertion is about the same. She's also c/o nasal congestion, HAs, and sinus pressure. O2 sat is 94% on RA. This is baseline for pt. She is afebrile at present. No other problems stated.     (7/24/2020): 69 y.o.  female with a PMHx significant for HTN, HLD, CKD Stg III, Solitary Kidney (kidney donor), psoriasis, OA, PNA, COPD and DARRON, presenting for routine f/u. Bp at goal today. Currently taking Losartan 25 mg po daily. Denies ADR. . Currently taking Atorvastatin 40 mg po daily. Tolerating well. Following Renal Clinic for CKD Stg III. Last OV 7/16/20. Renal indices stable. COPD managed with Advair daily and Duonebs/ProAir inhaler as needed. Uses Spiriva as needed. She endorses occasional dyspnea and wheezing with moderate exertion that resolves with rest. She is using O2 via NC at night and occasionally during a nap when she feels fatigued. Today, pt is c/o left hip pain x 2-3 weeks, occurring mainly at night when she lies flat. No known injuries. Pain throbbing and aching in quality. Moderate to severe intensity. Provoked by lying flat. Minimal relief with  "changing positions. Pain interferes with restful sleep. Unable to take NSAIDs. Has been "taking a lot of Tylenol lately" with no relief. Has used Tramadol in the past with some relief. States she was given Tramadol during an ED visit for pain in the past since she can't take "anything else because of my kidneys." She's otherwise doing well. Has started a Weight Watcher's program x 1 week. Has lost 3 lbs. No other problems stated.     Health Maintenance:   Colon Ca Screening-FIT 12/1/19, negative   Breast Ca Screening-Mammo 10/25/19, negative   Lung Ca Screening-declines     (1/25/2021): 70 y.o.  female with a PMHx significant for HTN, HLD, CKD Stg III, Solitary Kidney (kidney donor), psoriasis, OA, PNA, COPD and DARRON, presenting for routine f/u. Bp at goal today. Currently taking Losartan 25 mg po daily. Renal indices stable. She f/u with Renal via telemedicine last week. COPD managed with Advair daily and Duonebs/ProAir inhaler as needed. Uses Spiriva as needed. She endorses occasional dyspnea and wheezing with moderate exertion that resolves with rest. She is using O2 via NC at night and occasionally during a nap when she feels fatigued. She does report an increase in cough and mucous production over the last week that she attributes to the changing climate. She denies any abnl color to mucous, fever, chills, or worsening dyspnea. States sx improved with spirometer and CATE. On CPAP for DARRON. FLP WNL. Taking Atorvastatin and tolerating well. Previously referred to Sx clinic for gallstones. No appt today. C/o epigastric burning. Trying to avoid triggering foods. She had an eye exam at an outside clinic and was diagnosed with cataracts. States she'd noticed a change in vision, trouble seeing print close up, and cloudiness for a while. Needs a referral to ophthalmology. She is amenable to receiving the Shingrix vaccine. She denies any B/B complaints or falls. States feeling well. No other concerns.  Health " "Maintenance:   Colon Ca Screening-FIT 12/1/19, negative   Breast Ca Screening-Mammo 10/2020, negative   Lung Ca Screening-declines   Bone Density 10/2020, normal BMD     (7/26/2021): 70 y.o.  female with a PMHx significant for HTN, HLD, CKD Stg III, Solitary Kidney (kidney donor), psoriasis, OA, PNA, COPD and DARRON, presenting for routine f/u. Bp at goal today. Currently taking Losartan 25 mg po daily. Renal indices stable. No microalb on urine test. TSH WNL. She continues to follow renal clinic. COPD managed with Advair daily and Duonebs/ProAir inhaler as needed. Uses Spiriva as needed. She endorses occasional dyspnea and wheezing with moderate exertion that resolves with rest. She is using O2 via NC at night and occasionally during a nap when she feels fatigued. No changes from baseline. She would like a refill on benzonatate for PRN use cough. She's been evaluated in Sx clinic of gallstones. Due to higher surgical candidate risk given comorbidities, it was decided that pt would opt out of any unnecessary surgical procedures. Admits epigastric discomfort improves with omeprazole PRN. Screening mammo due 10/2021. Pt scheduled for cataract sx next month. She is requesting medication refills. No acute concerns.     (1/27/2022): 71 y.o.  female with a PMHx significant for HTN, HLD, CKD Stg III, Solitary Kidney (kidney donor), psoriasis, OA, PNA, COPD and DARRON, presenting for routine f/u. Bp at goal today. Currently taking Losartan 25 mg po daily. Renal indices stable. No microalb on urine test. She did see Renal Clinic 1/24/22. Noted stable with RTC 6 mths. Other labs reviewed and stable compared to baseline. Patient presented to ED on 1/7/22 with c/o left knee pain. Patient states her dogs hit the back of her left leg about three weeks ago and it "popped." Endorses pain to lateral aspect of knee and swelling. Has tried Tylenol for pain with no relief. Certain positions makes pain worse, movement makes " "pain better by decreasing "soreness and stiffness." In ER, XR of knee negative; prescribed prednisone and Voltaren. Appreciates a referral to Ortho. Denies falls. Reports "my lungs are doing well." She uses her spirometer regularly. Maintained with Advair and Devin PRN. No other concerns at this time.      Mammogram 11/2021 negative.     7/25/22: Patient presenting for routine f/u. VSS. Pt went to lab last week but only an A1c was drawn despite having labs orders in for her Renal appt today. A1c was WNL at 5.5. She reports that she's been feeling well. No new or worsening baseline dyspnea or chest pain. [facility] has been Ortho for OA to knees. S/p CSI which helped. Admits working on weight loss to improve knee stability and pain. Requesting refills on Advair and Benzonatate (states uses PRN cough). No recent COPD exacerbations. Due for FIT this month, DXA in October, and mammogram in November. She is amenable to all screenings. No other concerns.     1/25/23: Patient presenting for routine f/u. She contacted clinic about one week ago reporting Bp was above goal (150s-160s/80s-90s). She was taking Losartan 25 mg po daily. She was instructed to increase Losartan to 50 mg po daily. [facility] Bp has improved since doing so. Bp is at goal today. She is endorsing increased dyspnea (trouble ambulating 100ft or greater) and fatigue. Admits checking o2 saturation at home and noting numbers in the low 90s. States feels better when saturation is in the mid-90s. Admits having oxygen at home but primarily uses qHS due to fear of dependency. She does not endorse chest pain or increased LE edema. She does have a h/o DARRON but is not under treatment due to inability to tolerate face mask. She recently completed labs that were significant for an abnl FLP (worsened from previous). Admits following a low-fat diet and taking Atorvastatin 40 mg as prescribed. Other labs stable compared to baseline. She had a normal mammogram and DXA in " "November. She is amenable to pneumonia vaccine per recs. She has no other concerns.      4/25/23: Patient presenting for 3-month f/u. She was started on Trelegy at last visit. States work "great." She was able to get prescription assistance for the medication via the 's website. Completed PFTs and an Echo 2/28/23;     PFTs:   Nonspecific decrease in flow rates with no measured obstruction or restriction     Echo:  ·           The left ventricle is normal in size with normal systolic function.  ·           The estimated ejection fraction is 65%.  ·           Normal left ventricular diastolic function.  ·           Normal right ventricular size with normal right ventricular systolic function.  ·           Normal central venous pressure (3 mmHg).  ·           The estimated PA systolic pressure is 13 mmHg.  ·           IVC is normal.  ·           There is no pulmonary hypertension.  ·           Mild left atrial enlargement.     At last visit, she was to continue Losartan 50 mg po daily (previously changed). Doing well with BP WNL. And, she was started on Crestor for better HLD control. FLP improved significantly. LDL 70s as compared to 150s at last visit. Tolerating well.     She is c/o exacerbation of COPD sx.     Cough  This is a new problem. The current episode started 1 to 4 weeks ago. The problem has been waxing and waning. The problem occurs every few minutes. The cough is Productive of sputum. Associated symptoms include headaches, nasal congestion and wheezing. Pertinent negatives include no chest pain, chills, ear congestion, ear pain, fever, heartburn, hemoptysis, myalgias, postnasal drip, rash, rhinorrhea, sore throat, shortness of breath, sweats or weight loss. The symptoms are aggravated by cold air and pollens. She has tried a beta-agonist inhaler and prescription cough suppressant for the symptoms. The treatment provided mild relief. Her past medical history is significant for COPD. " "    5/3/23: Patient presenting for hospital f/u. She presented to the ED 4/28/23 with recurrent, squeezing, CP unrelieved at home. Bp was noted elevated as well after being WNL at routine office visit 4/25/23. Serial trop were negative. EKG and CXR unremarkable for any ischemic changes/acute cardiopulmonary processes. Note, she had an unremarkable Echo 2/2023 (see above). Due to persistently elevated hypertension, she was admitted overnight for observation. Her Losartan 50 mg daily was continued and Amlodipine 10 mg daily was added to regime. Patient discharged on 4/29/23 with referral to [facility], outpatient exercise [facility], and Cardiology outpatient. Her Lexiscan is scheduled 5/24/23. No appt in Cards yet. States after starting the Amlodipine initially, she felt "woozy" for a few days. Feeling better today but BP is lower end of normal. Also experiencing some leg fatigue, R>L, since hospitalization.    Of note, LFTs were noted trending upward throughout hospitalization and on day of discharge.    7/25/23: Patient presenting for routine f/u. At last visit, she was sent for an abd US due to elevated LFTs in hospital. She underwent US 5/22/23 which revealed a gallstone. She was referred to Sx clinic for eval. She was subsequently cleared and is now scheduled for sx 8/4/23 to have gallbladder removed. Recent CMP and UA stable compared to baseline.    She completed a FIT early May that was negative.    She is requesting a paper script of Trelegy to mail to  for prescription assistance.    H/o DARRON. Last study > 5 years ago. Patient has not used a CPAP due to unable to tolerate face mask. She continues with s/s of EDS (drowsiness, fatigue, nighttime awakenings). States interested in the nasal apparatus and would like to undergo another test to update her recommendations.       Review of Systems     Review of Systems   Constitutional:  Positive for fatigue. Negative for fever and unexpected weight " change.   HENT: Negative.  Negative for congestion, tinnitus, trouble swallowing and voice change.    Eyes: Negative.    Respiratory:  Positive for wheezing. Negative for apnea, cough, choking, chest tightness, shortness of breath and stridor.    Cardiovascular:  Positive for leg swelling. Negative for chest pain and palpitations.   Gastrointestinal:  Positive for abdominal pain (intermittent, colicky).   Endocrine: Negative.    Genitourinary: Negative.    Musculoskeletal: Negative.    Skin: Negative.    Allergic/Immunologic: Negative.    Neurological: Negative.  Negative for dizziness, tremors, seizures, syncope, facial asymmetry, speech difficulty, weakness, light-headedness, numbness and headaches.   Hematological: Negative.    Psychiatric/Behavioral: Negative.  Negative for sleep disturbance and suicidal ideas.    All other systems reviewed and are negative.    Medical / Social / Family History     Past Medical History:   Diagnosis Date    Anxiety disorder, unspecified     CKD (chronic kidney disease)     COPD (chronic obstructive pulmonary disease)     Dyslipidemia     History of radical nephrectomy     HTN (hypertension)     DARRON (obstructive sleep apnea)        Past Surgical History:   Procedure Laterality Date    bilateral oophrectomy      CATARACT EXTRACTION      CATARACT EXTRACTION W/ INTRAOCULAR LENS IMPLANT Right 08/05/2021    CATARACT EXTRACTION W/ INTRAOCULAR LENS IMPLANT Left 07/13/2021    EYE SURGERY  08/2021    cataract    HYSTERECTOMY  1997       Social History  Ms.  reports that she quit smoking about 6 years ago. Her smoking use included cigarettes. She has been exposed to tobacco smoke. She has never used smokeless tobacco. She reports that she does not currently use alcohol. She reports that she does not use drugs.    Family History  Ms.'s family history includes Cancer in her mother; Heart attack in her father; Heart failure in her father.    Medications and Allergies     Medications  Current  "Outpatient Medications   Medication Instructions    albuterol (PROVENTIL/VENTOLIN HFA) 90 mcg/actuation inhaler 1 puff, Inhalation, Every 6 hours PRN    albuterol-ipratropium (DUO-NEB) 2.5 mg-0.5 mg/3 mL nebulizer solution 3 mLs, Nebulization, 4 times daily PRN    amLODIPine (NORVASC) 10 mg, Oral, Daily    benzonatate (TESSALON) 100 mg, Oral, 3 times daily PRN    calcium carbonate (OS-GINGER) 500 mg calcium (1,250 mg) tablet 1 tablet, Oral, 2 times daily    cetirizine (ZYRTEC) 10 mg, Oral, Daily    diclofenac sodium (VOLTAREN) 1 % Gel Topical, 4 times daily PRN    fluticasone-umeclidin-vilanter (TRELEGY ELLIPTA) 100-62.5-25 mcg DsDv 1 puff, Inhalation, Daily    losartan (COZAAR) 50 mg, Oral, Daily    omeprazole (PRILOSEC) 20 mg, Oral, Daily    rosuvastatin (CRESTOR) 40 mg, Oral, Nightly       Allergies  Review of patient's allergies indicates:  No Known Allergies    Physical Examination   Visit Vitals  /67 (BP Location: Left arm, Patient Position: Sitting, BP Method: Large (Automatic))   Pulse 86   Temp 97.9 °F (36.6 °C) (Oral)   Resp 20   Ht 5' 2" (1.575 m)   Wt 99.4 kg (219 lb 3.2 oz)   BMI 40.09 kg/m²       Physical Exam  Constitutional:       Appearance: Normal appearance.   HENT:      Head: Normocephalic and atraumatic.      Right Ear: External ear normal.      Left Ear: External ear normal.   Eyes:      Extraocular Movements: Extraocular movements intact.   Cardiovascular:      Rate and Rhythm: Normal rate and regular rhythm.      Pulses: Normal pulses.      Heart sounds: Normal heart sounds.   Pulmonary:      Effort: Pulmonary effort is normal.      Breath sounds: Wheezing (mild, expiratory to posterior lobes) present. No rales.   Abdominal:      General: Bowel sounds are normal. There is no distension.      Palpations: Abdomen is soft. There is no mass.   Musculoskeletal:         General: Normal range of motion.      Right lower leg: Edema present.      Left lower leg: Edema present.   Skin:     General: " Skin is warm and dry.   Neurological:      General: No focal deficit present.      Mental Status: She is alert and oriented to person, place, and time.   Psychiatric:         Mood and Affect: Mood normal.         Behavior: Behavior normal.         Thought Content: Thought content normal.         Judgment: Judgment normal.         Results     Chemistry:  Lab Results   Component Value Date     07/24/2023    K 4.5 07/24/2023    CHLORIDE 109 (H) 07/24/2023    BUN 23.4 (H) 07/24/2023    CREATININE 1.33 (H) 07/24/2023    EGFRNORACEVR 43 07/24/2023    GLUCOSE 100 07/24/2023    CALCIUM 9.4 07/24/2023    ALKPHOS 125 07/24/2023    LABPROT 7.1 07/24/2023    ALBUMIN 4.0 07/24/2023    BILIDIR 0.2 01/11/2019    IBILI 0.3 01/11/2019    AST 31 07/24/2023    ALT 28 07/24/2023    MG 2.1 11/08/2018    PHOS 3.0 11/08/2018    HWKKALEJ40JY 40.08 11/08/2018        Lab Results   Component Value Date    HGBA1C 5.5 07/21/2022        Hematology:  Lab Results   Component Value Date    WBC 9.8 04/29/2023    HGB 13.6 04/29/2023    HCT 41.8 04/29/2023     04/29/2023       Lipid Panel:  Lab Results   Component Value Date    CHOL 142 04/24/2023    HDL 52 04/24/2023    LDL 72.00 04/24/2023    TRIG 89 04/24/2023    TOTALCHOLEST 3 04/24/2023        Urine:  Lab Results   Component Value Date    COLORUA Light-Yellow 07/24/2023    APPEARANCEUA Clear 07/24/2023    SGUA 1.018 07/24/2023    PHUA 6.0 07/24/2023    PROTEINUA Negative 07/24/2023    GLUCOSEUA Normal 07/24/2023    KETONESUA Negative 07/24/2023    BLOODUA Negative 07/24/2023    NITRITESUA Negative 07/24/2023    LEUKOCYTESUR Negative 07/24/2023    RBCUA 0-5 07/24/2023    WBCUA 0-5 07/24/2023    BACTERIA None Seen 07/24/2023    SQEPUA Occ (A) 07/24/2023    HYALINECASTS None Seen 07/24/2023    CREATRANDUR 126.4 (H) 07/24/2023    PROTEINURINE 12.3 07/24/2023    UPROTCREA 0.1 07/24/2023          Assessment        ICD-10-CM ICD-9-CM   1. Wellness examination  Z00.00 V70.0   2. Obstructive  sleep apnea syndrome  G47.33 327.23   3. Chronic obstructive pulmonary disease, unspecified COPD type  J44.9 496   4. Encounter for screening mammogram for malignant neoplasm of breast  Z12.31 V76.12   5. Mixed hyperlipidemia  E78.2 272.2   6. Screening for diabetes mellitus  Z13.1 V77.1   7. Asthma, unspecified asthma severity, unspecified whether complicated, unspecified whether persistent  J45.909 493.90   8. Primary hypertension  I10 401.9   9. Stage 3b chronic kidney disease  N18.32 585.3   10. Bilateral leg edema  R60.0 782.3          Plan (including Health Maintenance)     Problem List Items Addressed This Visit          Pulmonary    Asthma    Current Assessment & Plan     Continue Trelegy  Use your rescue inhaler and nebulizer for acute asthma symptoms when they occur. These are your rescue medications and help to relax tight muscles around your airways. If you are having to use these medications more than 2x per week, please notify the clinic as this could indicate poor asthma control.  Avoid asthma triggers (i.e., pet dander, molds, dust mites, cockroaches, pollen, cigarette smoke, respiratory illnesses, etc)  Stay up-to-date with immunizations, especially the influenza and pneumonia vaccinations           Chronic obstructive pulmonary disease    Overview     PFTs 2/28/23: Nonspecific decrease in flow rates with no measured obstruction or restriction         Current Assessment & Plan     Doing much better on Trelegy. Continue.  Continue Devin PRN  Will tx acute exacerbation w/ Z-pack and short course of Prednisone  O2 sat 97% on RA at this time  ED precautions for worsening sx or decreased saturation < 90%         Relevant Medications    fluticasone-umeclidin-vilanter (TRELEGY ELLIPTA) 100-62.5-25 mcg DsDv       Cardiac/Vascular    Hyperlipidemia    Relevant Orders    Lipid Panel    Comprehensive Metabolic Panel    CBC Auto Differential    Hypertension    Overview     Losartan 50 mg po daily, Amlodipine 10  mg po daily         Current Assessment & Plan     At goal  Continue meds  DASH            Renal/    Stage 3 chronic kidney disease    Current Assessment & Plan     Stable renal indices. F/u Renal clinic            Other    Obstructive sleep apnea syndrome    Relevant Orders    Ambulatory referral/consult to Sleep Disorders    Wellness examination - Primary    Overview     Health Maintenance:   Colon Ca Screening-FIT 7/28/21, negative; 5/3/23, negative   Breast Ca Screening-Mammo 11/7/2022, negative   Lung Ca Screening-declines   Bone Density 11/7/2022, normal BMD           Relevant Orders    TSH    Bilateral leg edema    Current Assessment & Plan     Chronic. No new or worsening. Monitor CCB therapy  Leg elevated, low sodium diet  Echo: Echo (2023)  Interpretation Summary  · The left ventricle is normal in size with normal systolic function.  · The estimated ejection fraction is 65%.  · Normal left ventricular diastolic function.  · Normal right ventricular size with normal right ventricular systolic function.  · Normal central venous pressure (3 mmHg).  · The estimated PA systolic pressure is 13 mmHg.  · IVC is normal.  · There is no pulmonary hypertension.  · Mild left atrial enlargement.          Other Visit Diagnoses       Encounter for screening mammogram for malignant neoplasm of breast        Relevant Orders    Mammo Digital Screening Bilat w/ Uriah    Screening for diabetes mellitus        Relevant Orders    Hemoglobin A1C                Health Maintenance Due   Topic Date Due    COVID-19 Vaccine (3 - Pfizer series) 05/07/2021    Hemoglobin A1c (Prediabetes)  07/21/2023       Future Appointments   Date Time Provider Department Center   7/31/2023  9:15 AM BIRGIT Licea [facility] NEPHROXANNA Castro   8/17/2023 11:00 AM SURGERY CLINIC, [facility] GENERAL SURGERY [facility] DEREK Castro   11/2/2023  7:45 AM PROVIDERS, NATASHA Dhaliwal   11/28/2023  7:15 AM BIRGIT Stafford [facility] INTMED  Spencer Castro      For any new or worsening symptoms that are urgent, or for any s/s of MI, CVA, or any other emergent concerns, please visit the ER for further eval. Otherwise, call clinic with questions or concerns.      Follow up in about 4 months (around 11/25/2023).    Signature:  BIRGIT Stafford  [facility]  [address] [facility] [id number]    Date of encounter: 7/25/23

## 2023-07-25 NOTE — ASSESSMENT & PLAN NOTE
Continue Trelegy  Use your rescue inhaler and nebulizer for acute asthma symptoms when they occur. These are your rescue medications and help to relax tight muscles around your airways. If you are having to use these medications more than 2x per week, please notify the clinic as this could indicate poor asthma control.  Avoid asthma triggers (i.e., pet dander, molds, dust mites, cockroaches, pollen, cigarette smoke, respiratory illnesses, etc)  Stay up-to-date with immunizations, especially the influenza and pneumonia vaccinations

## 2023-07-27 ENCOUNTER — ANESTHESIA EVENT (OUTPATIENT)
Dept: SURGERY | Facility: HOSPITAL | Age: 73
End: 2023-07-27

## 2023-07-27 NOTE — ANESTHESIA PREPROCEDURE EVALUATION
"                                                                                                             07/27/2023  Marisel Resendez is a 72 y.o., female.    COVID STATUS: PFIZER x 2 (LASTLY 3/12/21)  BETA-BLOCKER: NONE    Vitals:    08/04/23 0533 08/04/23 0539 08/04/23 0612 08/04/23 0616   BP: 116/75  116/75 135/87   Pulse: 87   88   Resp:    20   Temp: 36.9 °C (98.4 °F)   36.1 °C (97 °F)   TempSrc: Oral   Temporal   SpO2: 95%   100%   Weight:  98.4 kg (216 lb 14.9 oz)           PAT NURSE PHONE INTERVIEW 7/31/23    PROBLEM LIST:  -  CHOLELITHIASIS, BILIARY COLIC  -  MORBID OBESITY  -  HEART MURMUR  -  HTN      - HOSP. 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  -  GERD  -  ANXIETY (NO MEDs)  -  PSORIASIS   -  OA  -  BLE EDEMA  -  DARRON - NO Tx  -  4/8/19 CT HEAD = Bilateral cerebral patchy diminished attenuation statistically most  consistent with microvascular chronic ischemic change  -  PAST SMOKER - QUIT 2016, 50 PPY; COPD - QUICK RELIEF USE (LASTLY "SEVERAL MONTHS AGO", NEB Tx 1-3 x/DAY; O2@ 2L/NC @ HS   -  ETOH "OCASSIONALLY"    AM Rx DOS: ALBUTEROL INHAL or NEB Tx PRN, AMLODIPINE, TESSALON PRN, TRELEGY, LOSARTAN, OMEPRAZOLE    ORDERS -   SURGEON: 4/28/23 CXR; 4/29/23 CBC; 5/24/23 EKG; 7/24/23 CMP;  ANESTHESIA: NONE  CARDs ([facility]) 7/13/23 OV/CLEARANCE "LOW RISK" (SEE BELOW)  Pre-op Assessment    I have reviewed the Patient Summary Reports.     I have reviewed the Nursing Notes. I have reviewed the NPO Status.   I have reviewed the Medications.     Review of Systems  Anesthesia Hx:  No problems with previous Anesthesia  History of prior surgery of interest to airway management or planning: Denies Family Hx of Anesthesia complications.   Denies Personal Hx of Anesthesia complications.   Hematology/Oncology:  Hematology Normal   Oncology Normal     EENT/Dental:EENT/Dental Normal   Cardiovascular:  Cardiovascular " Normal     Pulmonary:  Pulmonary Normal    Renal/:  Renal/ Normal     Hepatic/GI:  Hepatic/GI Normal    Musculoskeletal:  Musculoskeletal Normal    Neurological:  Neurology Normal    Endocrine:  Endocrine Normal    Dermatological:  Skin Normal    Psych:  Psychiatric Normal           Physical Exam  General: Well nourished, Cooperative, Alert and Oriented    Airway:  Mallampati: I / I  Mouth Opening: Normal  TM Distance: Normal  Tongue: Normal  Neck ROM: Normal ROM    Dental:  Intact        Anesthesia Plan  Type of Anesthesia, risks & benefits discussed:    Anesthesia Type: Gen ETT  Intra-op Monitoring Plan: Standard ASA Monitors  Post Op Pain Control Plan: multimodal analgesia and IV/PO Opioids PRN  Induction:  IV  Airway Plan: Direct  Informed Consent: Informed consent signed with the Patient and all parties understand the risks and agree with anesthesia plan.  All questions answered. Patient consented to blood products? No  ASA Score: 3  Day of Surgery Review of History & Physical: H&P Update referred to the surgeon/provider.    Ready For Surgery From Anesthesia Perspective.     .    Lab Results   Component Value Date    WBC 9.8 04/29/2023    HGB 13.6 04/29/2023    HCT 41.8 04/29/2023    MCV 99.5 (H) 04/29/2023     04/29/2023     CMP  Sodium Level   Date Value Ref Range Status   07/24/2023 141 136 - 145 mmol/L Final     Potassium Level   Date Value Ref Range Status   07/24/2023 4.5 3.5 - 5.1 mmol/L Final     Carbon Dioxide   Date Value Ref Range Status   07/24/2023 25 23 - 31 mmol/L Final     Blood Urea Nitrogen   Date Value Ref Range Status   07/24/2023 23.4 (H) 9.8 - 20.1 mg/dL Final     Creatinine   Date Value Ref Range Status   07/24/2023 1.33 (H) 0.55 - 1.02 mg/dL Final     Calcium Level Total   Date Value Ref Range Status   07/24/2023 9.4 8.4 - 10.2 mg/dL Final     Albumin Level   Date Value Ref Range Status   07/24/2023 4.0 3.4 - 4.8 g/dL Final     Bilirubin Total   Date Value Ref Range Status    07/24/2023 0.5 <=1.5 mg/dL Final     Alkaline Phosphatase   Date Value Ref Range Status   07/24/2023 125 40 - 150 unit/L Final     Aspartate Aminotransferase   Date Value Ref Range Status   07/24/2023 31 5 - 34 unit/L Final     Alanine Aminotransferase   Date Value Ref Range Status   07/24/2023 28 0 - 55 unit/L Final     eGFR   Date Value Ref Range Status   07/24/2023 43 mls/min/1.73/m2 Final         2/28/23 ECHO   The left ventricle is normal in size with normal systolic function.   The estimated ejection fraction is 65%.   Normal left ventricular diastolic function.   Normal right ventricular size with normal right ventricular systolic function.   Normal central venous pressure (3 mmHg).   The estimated PA systolic pressure is 13 mmHg.   IVC is normal.   There is no pulmonary hypertension.   Mild left atrial enlargement.    5/24/23 JN    Normal myocardial perfusion scan. There is no evidence of myocardial ischemia or infarction.    The gated perfusion images showed an ejection fraction of 86% at rest. The gated perfusion images showed an ejection fraction of 89% post stress.    The patient reported no chest pain during the stress test.    There were no arrhythmias during stress.    The patient exercised for 4 minutes 30 seconds on a Chavez protocol, corresponding to a functional capacity of 7 METS, achieving a peak heart rate of 139 bpm, which is 96 % of the age predicted maximum heart rate.     On the nuclear stress test the EF is supranormal.  If the patient has an echo, the EF on the echo is considered more accurate.         The patient has a low risk nuclear stress test.      4/28/23 CXR  FINDINGS:  Cardiopericardial silhouette is within normal limits.  Left lower lung lobe retrocardiac area is obscured on this portable radiograph.  Bibasilar hypoventilatory changes without definite dense focal or segmental consolidation, congestive process, pleural effusions or  pneumothorax.     Impression:     NO ACUTE CARDIOPULMONARY PROCESS IDENTIFIED.    2/28/23 PFT      7/13/23 CARDs OV/CLEARANCE  Assessment/Plan:      Marisel Resendez is a 72 y.o. female with PMHx HTN, HLD, CKD3, Solitary Kidney (kidney donor), COPD and DARRON who presents for evaluation of preoperative cardiac risk assessment for noncardiac surgery.      Perioperative Cardiovascular Risk Assessment and Management for Noncardiac Procedure/Surgery  - Planned/Proposed Procedure/Surgery:  Laparoscopic cholecystectomy  - Timing of Procedure/Surgery: Elective  - Risk of Procedure/Surgery: Intermediate  - METS: >/=4  - Active Cardiac Conditions: No current or active   - RCRI Score: 0  - RCRI Risk Factors: None  - Other CV Risk Factors: HTN and CKD  - The patient is low risk (<1% risk of MACE) for a Intermediate risk procedure  - No further cardiac workup is indicated at this time     Essential hypertension   Patient likely has renovascular component given solitary kidney, BP at goal this visit (<130/<80)  -continue amlodipine 10 mg p.o. daily (refill provided)     Follow up if symptoms worsen or fail to improve.         Dickson Ferrer M.D.  [facility] Cardiology Fellow, PGY-4

## 2023-07-31 ENCOUNTER — OFFICE VISIT (OUTPATIENT)
Dept: NEPHROLOGY | Facility: CLINIC | Age: 73
End: 2023-07-31

## 2023-07-31 VITALS
RESPIRATION RATE: 18 BRPM | SYSTOLIC BLOOD PRESSURE: 99 MMHG | TEMPERATURE: 98 F | HEIGHT: 62 IN | OXYGEN SATURATION: 95 % | DIASTOLIC BLOOD PRESSURE: 67 MMHG | WEIGHT: 217 LBS | HEART RATE: 80 BPM | BODY MASS INDEX: 39.93 KG/M2

## 2023-07-31 DIAGNOSIS — N18.32 CKD STAGE G3B/A1, GFR 30-44 AND ALBUMIN CREATININE RATIO <30 MG/G: Primary | ICD-10-CM

## 2023-07-31 DIAGNOSIS — I10 PRIMARY HYPERTENSION: ICD-10-CM

## 2023-07-31 PROCEDURE — 99214 OFFICE O/P EST MOD 30 MIN: CPT | Mod: PBBFAC | Performed by: NURSE PRACTITIONER

## 2023-07-31 PROCEDURE — 99214 OFFICE O/P EST MOD 30 MIN: CPT | Mod: S$PBB,,, | Performed by: NURSE PRACTITIONER

## 2023-07-31 PROCEDURE — 99214 PR OFFICE/OUTPT VISIT, EST, LEVL IV, 30-39 MIN: ICD-10-PCS | Mod: S$PBB,,, | Performed by: NURSE PRACTITIONER

## 2023-07-31 NOTE — PATIENT INSTRUCTIONS
Please stop amlodipine  Monitor blood pressure readings at home (check 2 hours after taking the medication)  Call the clinic if BP is above 140/90

## 2023-07-31 NOTE — PROGRESS NOTES
[facility]  Nephrology Clinic Note    Chief complaint: Chronic Kidney Disease (RTC,took meds, edema lle, dyspnea)    History of present illness:   Marisel Resendez is a 72 y.o. White female with past medical history of  chronic kidney disease stage III, solitary kidney (she is a kidney donor, status post radical nephrectomy in 2003), hypertension, dyslipidemia, anxiety, COPD, and DARRON (not on NIPPV). She presents for follow-up appointment in nephrology clinic.  Was hospitalized in April of 2023, diagnosed with cholelithiasis.  At that time amlodipine was added for better blood pressure control.  Patient is scheduled for laparoscopic cholecystectomy on 08/04/2023.  She is complaining of bilateral lower extremity edema today.     Review of Systems  12 point review of systems conducted, negative except as stated in the history of present illness.    Allergies: Patient has No Known Allergies.     Past Medical History:  has a past medical history of Anxiety disorder, unspecified, CKD (chronic kidney disease), COPD (chronic obstructive pulmonary disease), Dyslipidemia, History of radical nephrectomy, HTN (hypertension), and DARRON (obstructive sleep apnea).    Procedure History:  has a past surgical history that includes Cataract extraction w/ intraocular lens implant (Right, 08/05/2021); Cataract extraction w/ intraocular lens implant (Left, 07/13/2021); Hysterectomy (1997); bilateral oophrectomy (1990); and Robot-assisted laparoscopic donor nephrectomy (Right, 2002).    Family History: family history includes Cancer in her mother; Heart attack in her father; Heart failure in her father.    Social History:  reports that she quit smoking about 6 years ago. Her smoking use included cigarettes. She has been exposed to tobacco smoke. She has never used smokeless tobacco. She reports that she does not currently use alcohol. She reports that she does not use drugs.    Physical exam  There were no  vitals taken for this visit.  General appearance: Patient is in no acute distress.  Skin: No rashes or wounds.  HEENT: PERRLA, EOMI, no scleral icterus, no JVD. Neck is supple.  Chest: Respirations are unlabored. Lungs sounds are clear.   Heart: S1, S2.   Abdomen: Benign.  : Deferred.  Extremities: No edema, peripheral pulses are palpable.   Neuro: No focal deficits.     Home Medications:    Current Outpatient Medications:     albuterol (PROVENTIL/VENTOLIN HFA) 90 mcg/actuation inhaler, Inhale 1 puff into the lungs every 6 (six) hours as needed., Disp: , Rfl:     albuterol-ipratropium (DUO-NEB) 2.5 mg-0.5 mg/3 mL nebulizer solution, Take 3 mLs by nebulization 4 (four) times daily as needed for Shortness of Breath or Wheezing., Disp: 75 mL, Rfl: 6    amLODIPine (NORVASC) 10 MG tablet, Take 1 tablet (10 mg total) by mouth once daily., Disp: 90 tablet, Rfl: 3    benzonatate (TESSALON) 100 MG capsule, Take 1 capsule (100 mg total) by mouth 3 (three) times daily as needed for Cough., Disp: 30 capsule, Rfl: 1    calcium carbonate (OS-GINGER) 500 mg calcium (1,250 mg) tablet, Take 1 tablet by mouth 2 (two) times daily., Disp: , Rfl:     cetirizine (ZYRTEC) 10 MG tablet, Take 10 mg by mouth Daily., Disp: , Rfl:     diclofenac sodium (VOLTAREN) 1 % Gel, Apply topically 4 (four) times daily as needed., Disp: , Rfl:     fluticasone-umeclidin-vilanter (TRELEGY ELLIPTA) 100-62.5-25 mcg DsDv, Inhale 1 puff into the lungs once daily., Disp: 60 each, Rfl: 6    losartan (COZAAR) 50 MG tablet, Take 1 tablet (50 mg total) by mouth once daily., Disp: 90 tablet, Rfl: 1    omeprazole (PRILOSEC) 20 MG capsule, Take 1 capsule (20 mg total) by mouth once daily., Disp: 90 capsule, Rfl: 1    rosuvastatin (CRESTOR) 40 MG Tab, Take 1 tablet (40 mg total) by mouth every evening., Disp: 90 tablet, Rfl: 3    Laboratory data    Serum  Lab Results   Component Value Date    WBC 9.8 04/29/2023    HGB 13.6 04/29/2023    HCT 41.8 04/29/2023      04/29/2023    IRON 84 12/01/2017    TIBC 343 12/01/2017    TRANS 274.0 12/01/2017    LABIRON 24.5 12/01/2017    FERRITIN 68.3 12/01/2017     07/24/2023    K 4.5 07/24/2023    CHLORIDE 109 (H) 07/24/2023    CO2 25 07/24/2023    BUN 23.4 (H) 07/24/2023    CREATININE 1.33 (H) 07/24/2023    EGFRNORACEVR 43 07/24/2023    GLUCOSE 100 07/24/2023    CALCIUM 9.4 07/24/2023    ALKPHOS 125 07/24/2023    LABPROT 7.1 07/24/2023    ALBUMIN 4.0 07/24/2023    BILIDIR 0.2 01/11/2019    IBILI 0.3 01/11/2019    AST 31 07/24/2023    ALT 28 07/24/2023    MG 2.1 11/08/2018    PHOS 3.0 11/08/2018      Lab Results   Component Value Date    HGBA1C 5.5 07/21/2022    PTH 54.7 11/08/2018    FCTHGEUZ55TL 40.08 11/08/2018     Urine:  Lab Results   Component Value Date    COLORUA Light-Yellow 07/24/2023    APPEARANCEUA Clear 07/24/2023    SGUA 1.018 07/24/2023    PHUA 6.0 07/24/2023    PROTEINUA Negative 07/24/2023    GLUCOSEUA Normal 07/24/2023    KETONESUA Negative 07/24/2023    BLOODUA Negative 07/24/2023    NITRITESUA Negative 07/24/2023    LEUKOCYTESUR Negative 07/24/2023    RBCUA 0-5 07/24/2023    WBCUA 0-5 07/24/2023    BACTERIA None Seen 07/24/2023    SQEPUA Occ (A) 07/24/2023    HYALINECASTS None Seen 07/24/2023    CREATRANDUR 126.4 (H) 07/24/2023    PROTEINURINE 12.3 07/24/2023    UPROTCREA 0.1 07/24/2023         Imaging  (06/10/2015 08:58 CDT US Retroperitoneum Limited)  Clinical indication: Patient donated right kidney, solitary kidney.  Imaging of the retroperitoneal areas were obtained, with the right  kidney noted be surgically absent with no abnormality seen in the  right renal fossa. The left kidney is normal in appearance measuring  11.5 x 6.0 x 5.2 cm. There is no hydronephrosis, calculi, or evidence  of a solid mass. No perirenal fluid collections are demonstrated.  Renal cortex demonstrates normal echogenicity. The bladder was not  imaged.  Impression: Status post right nephrectomy with normal appearance the  left  kidney.    Impression and plan     CKD stage G3b/A1, GFR 30-44 and albumin creatinine ratio <30 mg/g  -     Comprehensive Metabolic Panel; Future; Expected date: 01/15/2024  -     Protein/Creatinine Ratio, Urine; Future; Expected date: 01/15/2024  -     Urinalysis, Reflex to Urine Culture; Future; Expected date: 01/15/2024  Solitary kidney.  Renal function has been is stable, there is no significant proteinuria.  Continue risk factor management and periodic monitoring.  Continue:  -follow 2 g a day dietary sodium restriction  -control high blood pressure (goal blood pressure is less than 130/80, please check blood pressure twice a week and bring blood pressure logs to office visit)  -exercise at least 30 minutes a day, 5 days a week  -maintain healthy weight  -stay well hydrated (drink water only, avoid juices, sweet tea, and sodas)  -ask about staying up-to-date on vaccinations (flu vaccine, pneumonia vaccine, hepatitis B vaccine)  -avoid excessive use of NSAIDs (ibuprofen, naproxen, Aleve, Advil, Toradol, Mobic), take Tylenol as needed for headache or mild pain  -take cholesterol lowering medications if prescribed (LDL goal less than 100)    Follow-up with the primary care provider as scheduled.  Return to subspecialty nephrology (kidney) clinic with routine labs in 6 months    Primary hypertension  Discontinue amlodipine due to lower extremity edema.  Continue home blood pressure monitoring.  Patient was advised to call the clinic if blood pressure readings remain consistently above 140/90, consider increasing losartan dose to 100 mg a day if needed.      BMI 40.0-44.9, adult  Lifestyle and dietary interventions discussed, patient encouraged to maintain non-sedentary lifestyle and well-balanced diet.        7/31/2023  Isabel Riojas NP  [facility] Nephrology

## 2023-08-03 ENCOUNTER — PATIENT MESSAGE (OUTPATIENT)
Dept: ADMINISTRATIVE | Facility: OTHER | Age: 73
End: 2023-08-03

## 2023-08-04 ENCOUNTER — ANESTHESIA (OUTPATIENT)
Dept: SURGERY | Facility: HOSPITAL | Age: 73
End: 2023-08-04

## 2023-08-04 ENCOUNTER — HOSPITAL ENCOUNTER (OUTPATIENT)
Facility: HOSPITAL | Age: 73
Discharge: HOME | End: 2023-08-04
Attending: SURGERY | Admitting: SURGERY

## 2023-08-04 VITALS
WEIGHT: 216.94 LBS | SYSTOLIC BLOOD PRESSURE: 143 MMHG | DIASTOLIC BLOOD PRESSURE: 79 MMHG | OXYGEN SATURATION: 96 % | BODY MASS INDEX: 39.92 KG/M2 | TEMPERATURE: 98 F | RESPIRATION RATE: 18 BRPM | HEART RATE: 82 BPM

## 2023-08-04 DIAGNOSIS — K80.50 BILIARY COLIC: ICD-10-CM

## 2023-08-04 PROCEDURE — 25000003 PHARM REV CODE 250: Performed by: NURSE ANESTHETIST, CERTIFIED REGISTERED

## 2023-08-04 PROCEDURE — 36000708 HC OR TIME LEV III 1ST 15 MIN: Performed by: SURGERY

## 2023-08-04 PROCEDURE — 71000015 HC POSTOP RECOV 1ST HR: Performed by: SURGERY

## 2023-08-04 PROCEDURE — D9220A PRA ANESTHESIA: ICD-10-PCS | Mod: ,,, | Performed by: SPECIALIST

## 2023-08-04 PROCEDURE — 63600175 PHARM REV CODE 636 W HCPCS: Performed by: NURSE ANESTHETIST, CERTIFIED REGISTERED

## 2023-08-04 PROCEDURE — 63600175 PHARM REV CODE 636 W HCPCS

## 2023-08-04 PROCEDURE — 37000008 HC ANESTHESIA 1ST 15 MINUTES: Performed by: SURGERY

## 2023-08-04 PROCEDURE — 71000016 HC POSTOP RECOV ADDL HR: Performed by: SURGERY

## 2023-08-04 PROCEDURE — 37000009 HC ANESTHESIA EA ADD 15 MINS: Performed by: SURGERY

## 2023-08-04 PROCEDURE — 27201423 OPTIME MED/SURG SUP & DEVICES STERILE SUPPLY: Performed by: SURGERY

## 2023-08-04 PROCEDURE — 71000033 HC RECOVERY, INTIAL HOUR: Performed by: SURGERY

## 2023-08-04 PROCEDURE — 25000003 PHARM REV CODE 250: Performed by: SPECIALIST

## 2023-08-04 PROCEDURE — D9220A PRA ANESTHESIA: Mod: ,,, | Performed by: SPECIALIST

## 2023-08-04 PROCEDURE — 36000709 HC OR TIME LEV III EA ADD 15 MIN: Performed by: SURGERY

## 2023-08-04 PROCEDURE — 88304 TISSUE EXAM BY PATHOLOGIST: CPT | Mod: TC | Performed by: SURGERY

## 2023-08-04 PROCEDURE — 63600175 PHARM REV CODE 636 W HCPCS: Performed by: STUDENT IN AN ORGANIZED HEALTH CARE EDUCATION/TRAINING PROGRAM

## 2023-08-04 PROCEDURE — 63600175 PHARM REV CODE 636 W HCPCS: Performed by: SURGERY

## 2023-08-04 PROCEDURE — 63600175 PHARM REV CODE 636 W HCPCS: Performed by: SPECIALIST

## 2023-08-04 RX ORDER — ONDANSETRON 4 MG/1
4 TABLET, ORALLY DISINTEGRATING ORAL EVERY 8 HOURS PRN
Qty: 20 TABLET | Refills: 0 | Status: SHIPPED | OUTPATIENT
Start: 2023-08-04 | End: 2023-11-28

## 2023-08-04 RX ORDER — CEFAZOLIN SODIUM 1 G/3ML
2 INJECTION, POWDER, FOR SOLUTION INTRAMUSCULAR; INTRAVENOUS ONCE
Status: COMPLETED | OUTPATIENT
Start: 2023-08-04 | End: 2023-08-04

## 2023-08-04 RX ORDER — LIDOCAINE HYDROCHLORIDE 10 MG/ML
1 INJECTION, SOLUTION EPIDURAL; INFILTRATION; INTRACAUDAL; PERINEURAL ONCE
Status: ACTIVE | OUTPATIENT
Start: 2023-08-04

## 2023-08-04 RX ORDER — SODIUM CHLORIDE, SODIUM LACTATE, POTASSIUM CHLORIDE, CALCIUM CHLORIDE 600; 310; 30; 20 MG/100ML; MG/100ML; MG/100ML; MG/100ML
INJECTION, SOLUTION INTRAVENOUS CONTINUOUS
Status: ACTIVE | OUTPATIENT
Start: 2023-08-04

## 2023-08-04 RX ORDER — HYDROCODONE BITARTRATE AND ACETAMINOPHEN 5; 325 MG/1; MG/1
1 TABLET ORAL EVERY 6 HOURS PRN
Qty: 15 TABLET | Refills: 0 | Status: SHIPPED | OUTPATIENT
Start: 2023-08-04 | End: 2023-11-28

## 2023-08-04 RX ORDER — ONDANSETRON 2 MG/ML
4 INJECTION INTRAMUSCULAR; INTRAVENOUS ONCE
Status: ACTIVE | OUTPATIENT
Start: 2023-08-04

## 2023-08-04 RX ORDER — LIDOCAINE HYDROCHLORIDE 20 MG/ML
INJECTION, SOLUTION EPIDURAL; INFILTRATION; INTRACAUDAL; PERINEURAL
Status: DISCONTINUED | OUTPATIENT
Start: 2023-08-04 | End: 2023-08-04

## 2023-08-04 RX ORDER — HYDROMORPHONE HYDROCHLORIDE 1 MG/ML
INJECTION, SOLUTION INTRAMUSCULAR; INTRAVENOUS; SUBCUTANEOUS
Status: DISCONTINUED
Start: 2023-08-04 | End: 2023-08-04 | Stop reason: HOSPADM

## 2023-08-04 RX ORDER — HYDROMORPHONE HYDROCHLORIDE 1 MG/ML
0.2 INJECTION, SOLUTION INTRAMUSCULAR; INTRAVENOUS; SUBCUTANEOUS EVERY 5 MIN PRN
Status: ACTIVE | OUTPATIENT
Start: 2023-08-04

## 2023-08-04 RX ORDER — PROCHLORPERAZINE EDISYLATE 5 MG/ML
5 INJECTION INTRAMUSCULAR; INTRAVENOUS ONCE AS NEEDED
Status: ACTIVE | OUTPATIENT
Start: 2023-08-04 | End: 2034-12-30

## 2023-08-04 RX ORDER — HEPARIN SODIUM 5000 [USP'U]/ML
5000 INJECTION, SOLUTION INTRAVENOUS; SUBCUTANEOUS ONCE
Status: COMPLETED | OUTPATIENT
Start: 2023-08-04 | End: 2023-08-04

## 2023-08-04 RX ORDER — MEPERIDINE HYDROCHLORIDE 25 MG/ML
12.5 INJECTION INTRAMUSCULAR; INTRAVENOUS; SUBCUTANEOUS ONCE
Status: ACTIVE | OUTPATIENT
Start: 2023-08-04 | End: 2023-08-05

## 2023-08-04 RX ORDER — BUPIVACAINE HYDROCHLORIDE 2.5 MG/ML
INJECTION, SOLUTION EPIDURAL; INFILTRATION; INTRACAUDAL
Status: DISCONTINUED | OUTPATIENT
Start: 2023-08-04 | End: 2023-08-04 | Stop reason: HOSPADM

## 2023-08-04 RX ORDER — EPHEDRINE SULFATE 50 MG/ML
INJECTION, SOLUTION INTRAVENOUS
Status: DISCONTINUED | OUTPATIENT
Start: 2023-08-04 | End: 2023-08-04

## 2023-08-04 RX ORDER — LEVALBUTEROL INHALATION SOLUTION 1.25 MG/3ML
SOLUTION RESPIRATORY (INHALATION)
Status: DISCONTINUED
Start: 2023-08-04 | End: 2023-08-04 | Stop reason: HOSPADM

## 2023-08-04 RX ORDER — DIPHENHYDRAMINE HYDROCHLORIDE 50 MG/ML
25 INJECTION INTRAMUSCULAR; INTRAVENOUS ONCE AS NEEDED
Status: ACTIVE | OUTPATIENT
Start: 2023-08-04 | End: 2034-12-30

## 2023-08-04 RX ORDER — ONDANSETRON HYDROCHLORIDE 2 MG/ML
INJECTION, SOLUTION INTRAMUSCULAR; INTRAVENOUS
Status: DISCONTINUED | OUTPATIENT
Start: 2023-08-04 | End: 2023-08-04

## 2023-08-04 RX ORDER — SUCCINYLCHOLINE CHLORIDE 20 MG/ML
INJECTION INTRAMUSCULAR; INTRAVENOUS
Status: DISCONTINUED | OUTPATIENT
Start: 2023-08-04 | End: 2023-08-04

## 2023-08-04 RX ORDER — MIDAZOLAM HYDROCHLORIDE 1 MG/ML
INJECTION INTRAMUSCULAR; INTRAVENOUS
Status: COMPLETED
Start: 2023-08-04 | End: 2023-08-04

## 2023-08-04 RX ORDER — BUPIVACAINE HYDROCHLORIDE 2.5 MG/ML
INJECTION, SOLUTION EPIDURAL; INFILTRATION; INTRACAUDAL
Status: DISCONTINUED
Start: 2023-08-04 | End: 2023-08-04 | Stop reason: HOSPADM

## 2023-08-04 RX ORDER — MIDAZOLAM HYDROCHLORIDE 1 MG/ML
2 INJECTION INTRAMUSCULAR; INTRAVENOUS ONCE
Status: COMPLETED | OUTPATIENT
Start: 2023-08-04 | End: 2023-08-04

## 2023-08-04 RX ORDER — DEXAMETHASONE SODIUM PHOSPHATE 4 MG/ML
INJECTION, SOLUTION INTRA-ARTICULAR; INTRALESIONAL; INTRAMUSCULAR; INTRAVENOUS; SOFT TISSUE
Status: DISCONTINUED | OUTPATIENT
Start: 2023-08-04 | End: 2023-08-04

## 2023-08-04 RX ORDER — FENTANYL CITRATE 50 UG/ML
INJECTION, SOLUTION INTRAMUSCULAR; INTRAVENOUS
Status: DISCONTINUED | OUTPATIENT
Start: 2023-08-04 | End: 2023-08-04

## 2023-08-04 RX ORDER — OXYCODONE AND ACETAMINOPHEN 5; 325 MG/1; MG/1
2 TABLET ORAL ONCE
Status: COMPLETED | OUTPATIENT
Start: 2023-08-04 | End: 2023-08-04

## 2023-08-04 RX ORDER — HYDROMORPHONE HYDROCHLORIDE 1 MG/ML
0.5 INJECTION, SOLUTION INTRAMUSCULAR; INTRAVENOUS; SUBCUTANEOUS EVERY 5 MIN PRN
Status: ACTIVE | OUTPATIENT
Start: 2023-08-04

## 2023-08-04 RX ORDER — PROPOFOL 10 MG/ML
VIAL (ML) INTRAVENOUS
Status: DISCONTINUED | OUTPATIENT
Start: 2023-08-04 | End: 2023-08-04

## 2023-08-04 RX ORDER — IPRATROPIUM BROMIDE AND ALBUTEROL SULFATE 2.5; .5 MG/3ML; MG/3ML
3 SOLUTION RESPIRATORY (INHALATION) ONCE AS NEEDED
Status: DISCONTINUED | OUTPATIENT
Start: 2023-08-04 | End: 2024-01-04

## 2023-08-04 RX ADMIN — PROPOFOL 200 MG: 10 INJECTION, EMULSION INTRAVENOUS at 07:08

## 2023-08-04 RX ADMIN — LIDOCAINE HYDROCHLORIDE 50 MG: 20 INJECTION, SOLUTION EPIDURAL; INFILTRATION; INTRACAUDAL; PERINEURAL at 07:08

## 2023-08-04 RX ADMIN — SODIUM CHLORIDE, POTASSIUM CHLORIDE, SODIUM LACTATE AND CALCIUM CHLORIDE: 600; 310; 30; 20 INJECTION, SOLUTION INTRAVENOUS at 06:08

## 2023-08-04 RX ADMIN — HYDROMORPHONE HYDROCHLORIDE 0.5 MG: 1 INJECTION, SOLUTION INTRAMUSCULAR; INTRAVENOUS; SUBCUTANEOUS at 09:08

## 2023-08-04 RX ADMIN — CEFAZOLIN 2 G: 330 INJECTION, POWDER, FOR SOLUTION INTRAMUSCULAR; INTRAVENOUS at 07:08

## 2023-08-04 RX ADMIN — DEXAMETHASONE SODIUM PHOSPHATE 8 MG: 4 INJECTION, SOLUTION INTRA-ARTICULAR; INTRALESIONAL; INTRAMUSCULAR; INTRAVENOUS; SOFT TISSUE at 07:08

## 2023-08-04 RX ADMIN — EPHEDRINE SULFATE 50 MG: 50 INJECTION INTRAVENOUS at 07:08

## 2023-08-04 RX ADMIN — SUCCINYLCHOLINE CHLORIDE 120 MG: 20 INJECTION, SOLUTION INTRAMUSCULAR; INTRAVENOUS at 07:08

## 2023-08-04 RX ADMIN — SUGAMMADEX 200 MG: 100 INJECTION, SOLUTION INTRAVENOUS at 08:08

## 2023-08-04 RX ADMIN — MIDAZOLAM HYDROCHLORIDE 2 MG: 1 INJECTION INTRAMUSCULAR; INTRAVENOUS at 06:08

## 2023-08-04 RX ADMIN — OXYCODONE HYDROCHLORIDE AND ACETAMINOPHEN 2 TABLET: 5; 325 TABLET ORAL at 09:08

## 2023-08-04 RX ADMIN — FENTANYL CITRATE 100 MCG: 50 INJECTION, SOLUTION INTRAMUSCULAR; INTRAVENOUS at 07:08

## 2023-08-04 RX ADMIN — HEPARIN SODIUM 5000 UNITS: 5000 INJECTION, SOLUTION INTRAVENOUS; SUBCUTANEOUS at 05:08

## 2023-08-04 RX ADMIN — MIDAZOLAM HYDROCHLORIDE 2 MG: 1 INJECTION, SOLUTION INTRAMUSCULAR; INTRAVENOUS at 06:08

## 2023-08-04 RX ADMIN — ONDANSETRON 4 MG: 2 INJECTION INTRAMUSCULAR; INTRAVENOUS at 07:08

## 2023-08-04 NOTE — OP NOTE
[facility]  Operative Note    SUMMARY     Surgery Date: 8/4/2023     Surgeon(s) and Role:     * Anton Marie Jr., MD - Primary     * Cindi Pierre MD - Resident - Assisting     * Isrrael Gomez MD - Resident - Assisting    Pre-op Diagnosis:  * No pre-op diagnosis entered *    Post-op Diagnosis:  Post-Op Diagnosis Codes:     * Biliary colic [K80.50]    Procedure(s) (LRB):  CHOLECYSTECTOMY, LAPAROSCOPIC (N/A)    Anesthesia: General    Operative Findings: Findings of chronic cholecystitis. Critical view achieved, clips in place at the end of the case. Hemostatic at end of case. Abnormal liver architecture at the dome of the liver, nodule noted along the lesser curve of the stomach.    Procedure Details: The patient was brought to the operating room, placed supine on the operating table, and general anesthesia was induced. Once adequately anesthetized, the abdomen was prepped and draped in the usual sterile fashion. A time out was performed, confirming the correct patient, procedure, and location and all team members were in agreement.     We started with a varess entry in the LUQ at palmars point. Pneumoperitoneum was established. A supraumbilical incision was made and a 12 mm optical view trocar was placed. Camera was introduced into the abdomen and surveyed in all four quadrants. There was no sign of injury to intraabdominal structures or viscus from Veress placement. We did note blood on the omentum upon visual inspection. A 5 mm port was placed in the RUQ. The blood was suction clean and there was no omental, mesenteric or bowel injury appreciated from our initial trocar placement. Additional trocars were introduced under direct vision. A 12 mm in epigastric and 2 additional 5mm ports in the right upper quadrant and one in the LUQ to retract the left lobe of the liver. The patient was positioned in the supine position in reverse Trendelenburg and right side up. The  gallbladder was identified, the fundus grasped and retracted cephalad and laterally. The infundibulum was grasped and retracted laterally, exposing the peritoneum overlying the triangle of Calot. This was then divided and exposed in a blunt fashion. The cystic duct was clearly identified and bluntly dissected circumferentially. The junctions of the gallbladder, cystic duct and common bile duct were clearly identified prior to the division of any linear structure. The cystic duct was then doubly ligated with surgical clips on the patient side and singly clipped on the gallbladder side and divided. The cystic artery was identified, dissected free, ligated doubly with clips on the patient side and with a single clip on the gallbladder side and divided as well. The gallbladder was dissected from the liver bed in retrograde fashion with the monopolar hook electrocautery. The gallbladder was removed. The liver bed was irrigated and inspected. Hemostasis was achieved with the electrocautery. Copious irrigation was utilized and was repeatedly aspirated until clear all particulate matter. There was abnormal liver architecture at the dome of the liver, a picture was taken of this. We also noted a nodule along the lesser curve of the stomach which was also captured with a picture. Pneumoperitoneum was completely reduced after viewing removal of the trocars under direct vision. The wound was thoroughly irrigated and the fascia was then closed with a figure of eight suture; the skin was then closed with 4-0 and dermabond was applied. Instrument, sponge, and needle counts were correct at closure and at the conclusion of the case. The patient was extubated and transported to pacu having suffered no untoward event.     Dr. Marie was present for the entirety of the procedure    Estimated Blood Loss: 10cc         Specimens:   Specimen (24h ago, onward)       Start     Ordered    08/04/23 0833  Specimen to Pathology  RELEASE UPON  ORDERING        References:    Click here for ordering Quick Tip   Question:  Release to patient  Answer:  Immediate    08/04/23 0833                    [id number]    Isrrael Gomez MD  08/04/2023 8:52 AM

## 2023-08-04 NOTE — INTERVAL H&P NOTE
The patient has been examined and the H&P has been reviewed:    I concur with the findings and no changes have occurred since H&P was written.    Surgery risks, benefits and alternative options discussed and understood by patient/family.      There are no hospital problems to display for this patient.    Mati Zavala MD  [facility] General Surgery HO-1  5:47 AM

## 2023-08-04 NOTE — DISCHARGE SUMMARY
[facility]  Discharge Note  Short Stay    Procedure(s) (LRB):  CHOLECYSTECTOMY, LAPAROSCOPIC (N/A)      OUTCOME: Patient tolerated treatment/procedure well without complication and is now ready for discharge.    DISPOSITION: Home or Self Care    FINAL DIAGNOSIS:  <principal problem not specified>    FOLLOWUP: In clinic    DISCHARGE INSTRUCTIONS:  No discharge procedures on file.     TIME SPENT ON DISCHARGE: 20 minutes

## 2023-08-04 NOTE — TRANSFER OF CARE
Anesthesia Transfer of Care Note    Patient: Marisel Resendez    Procedure(s) Performed: Procedure(s) (LRB):  CHOLECYSTECTOMY, LAPAROSCOPIC (N/A)    Patient location: PACU    Anesthesia Type: general    Transport from OR: Transported from OR on room air with adequate spontaneous ventilation    Post pain: adequate analgesia    Post assessment: no apparent anesthetic complications    Post vital signs: stable    Level of consciousness: awake    Complications: none    Transfer of care protocol was followed      Last vitals:   Visit Vitals  /87   Pulse 88   Temp 36.1 °C (97 °F) (Temporal)   Resp 20   Wt 98.4 kg (216 lb 14.9 oz)   SpO2 100%   Breastfeeding No   BMI 39.92 kg/m²

## 2023-08-04 NOTE — DISCHARGE INSTRUCTIONS
FOLLOW-UP APPOINTMENT     · FOLLOW UP: Appointment in __________________________ at ______________.    · PAIN: Apply ice pack to abdomen as needed for pain. Take Your prescription pain medication AS PRESCRIBED ONLY for severe pain unrelieved by Tylenol (acetaminophen). Do not take Tylenol (acetaminophen) with your PERCOCET/NORCO, since PERCOCET/NORCO contains Tylenol as well.    **CONTACT THE SURGEONS OFFICE IF YOUR PAIN MEDICATIONS ARE NOT PROVIDING ADEQUATE PAIN RELIEF.**    ·Dont drive or drink alcohol with pain medication. Dont take pain medication more often than it is prescribed to be taken.    INFECTION: Call your doctor at [phone] or report to the emergency room:    o if redness around your incision begins to spread, or you have swelling.    o If your incision has opened up    o If you have green, yellow, or cottage cheese-like drainage coming from your incision    o If you begin to run fever over 100.4 F    o if you are feeling weaker    - INCISION (WOUND) CARE: Leave adhesive glue on your skin until the glue peels away on its own. You may take a shower _____________. Wash gently over incision site - do not scrub or peel skin glue. Do not soak your wounds in water. Do not take baths, swim, or use a hot tub until your doctor says it is okay.    · NAUSEA: You can eat and drink whatever you like as tolerated. You may experience post anesthesia nausea. Apply cold cloths to your face and neck. Take prescription for nausea as needed. If you have any uncontrolled vomiting that is not resolving within a few hours, report to your emergency room.    · ACTIVITY: NO heavy lifting, twisting, bending, pulling, stretching for 4 weeks or until cleared by your doctor.     · Notify MD of any moderate to severe pain unrelieved by pain medicine or for any signs of infection including fever above 100.4, excessive redness or swelling, yellow/green foul- smelling drainage, nausea or vomiting. Clinics number is  [phone] or [phone]. If it is after business hours or emergency call [phone] and state Im having post op complications and need to speak to the surgeon on call.    · Thanks for choosing [facility]! Have a smooth recovery!

## 2023-08-04 NOTE — ANESTHESIA POSTPROCEDURE EVALUATION
Anesthesia Post Evaluation    Patient: Marisel Resendez    Procedure(s) Performed: Procedure(s) (LRB):  CHOLECYSTECTOMY, LAPAROSCOPIC (N/A)    Final Anesthesia Type: general      Patient location during evaluation: PACU  Patient participation: Yes- Able to Participate  Level of consciousness: awake and responds to stimulation  Post-procedure vital signs: reviewed and stable  Pain management: adequate  Airway patency: patent    PONV status at discharge: No PONV  Anesthetic complications: no      Cardiovascular status: blood pressure returned to baseline  Respiratory status: unassisted  Hydration status: euvolemic  Follow-up not needed.          Vitals Value Taken Time   /82 08/04/23 1016   Temp 36.4 °C (97.5 °F) 08/04/23 0930   Pulse 82 08/04/23 1029   Resp 18 08/04/23 1015   SpO2 95 % 08/04/23 1029   Vitals shown include unvalidated device data.      Event Time   Out of Recovery 09:15:00         Pain/Billie Score: Pain Rating Prior to Med Admin: 7 (8/4/2023  9:36 AM)  Billie Score: 10 (8/4/2023  9:25 AM)

## 2023-08-08 LAB
ESTROGEN SERPL-MCNC: NORMAL PG/ML
INSULIN SERPL-ACNC: NORMAL U[IU]/ML
LAB AP CLINICAL INFORMATION: NORMAL
LAB AP GROSS DESCRIPTION: NORMAL
LAB AP REPORT FOOTNOTES: NORMAL
T3RU NFR SERPL: NORMAL %

## 2023-08-17 ENCOUNTER — OFFICE VISIT (OUTPATIENT)
Dept: SURGERY | Facility: CLINIC | Age: 73
End: 2023-08-17

## 2023-08-17 VITALS
RESPIRATION RATE: 20 BRPM | SYSTOLIC BLOOD PRESSURE: 115 MMHG | DIASTOLIC BLOOD PRESSURE: 81 MMHG | TEMPERATURE: 98 F | OXYGEN SATURATION: 95 % | BODY MASS INDEX: 37.88 KG/M2 | WEIGHT: 213.81 LBS | HEART RATE: 82 BPM | HEIGHT: 63 IN

## 2023-08-17 DIAGNOSIS — Z90.49 S/P CHOLECYSTECTOMY: Primary | ICD-10-CM

## 2023-08-17 PROCEDURE — 99215 OFFICE O/P EST HI 40 MIN: CPT | Mod: PBBFAC

## 2023-08-17 NOTE — PROGRESS NOTES
[facility] Note    HPI: 72F with COPD, CKD, HLD, HTN and DARRON s/p lap kole for bilary colic 8/4. Has been recovering well, reports no issues eating voiding or stooling for the first two weeks since surgery. Has had some nausea and abdominal bloating intermittently for the last 2 days after taco night at home but states she feels well today. Minimal pain, very well controlled. Denies CP, SOB, fevers.     PMH:   Past Medical History:   Diagnosis Date    Anxiety disorder, unspecified     CKD (chronic kidney disease)     COPD (chronic obstructive pulmonary disease)     Dyslipidemia     History of radical nephrectomy     HTN (hypertension)     DARRON (obstructive sleep apnea)       Meds:   Current Outpatient Medications:     albuterol (PROVENTIL/VENTOLIN HFA) 90 mcg/actuation inhaler, Inhale 1 puff into the lungs every 6 (six) hours as needed., Disp: , Rfl:     albuterol-ipratropium (DUO-NEB) 2.5 mg-0.5 mg/3 mL nebulizer solution, Take 3 mLs by nebulization 4 (four) times daily as needed for Shortness of Breath or Wheezing., Disp: 75 mL, Rfl: 6    benzonatate (TESSALON) 100 MG capsule, Take 1 capsule (100 mg total) by mouth 3 (three) times daily as needed for Cough., Disp: 30 capsule, Rfl: 1    calcium carbonate (OS-GINGER) 500 mg calcium (1,250 mg) tablet, Take 1 tablet by mouth 2 (two) times daily., Disp: , Rfl:     cetirizine (ZYRTEC) 10 MG tablet, Take 10 mg by mouth Daily., Disp: , Rfl:     diclofenac sodium (VOLTAREN) 1 % Gel, Apply topically 4 (four) times daily as needed., Disp: , Rfl:     fluticasone-umeclidin-vilanter (TRELEGY ELLIPTA) 100-62.5-25 mcg DsDv, Inhale 1 puff into the lungs once daily., Disp: 60 each, Rfl: 6    losartan (COZAAR) 50 MG tablet, Take 1 tablet (50 mg total) by mouth once daily., Disp: 90 tablet, Rfl: 1    omeprazole (PRILOSEC) 20 MG capsule, Take 1 capsule (20 mg total) by mouth once daily., Disp: 90 capsule, Rfl: 1    ondansetron (ZOFRAN-ODT) 4 MG TbDL, Take 1 tablet (4 mg  total) by mouth every 8 (eight) hours as needed., Disp: 20 tablet, Rfl: 0    rosuvastatin (CRESTOR) 40 MG Tab, Take 1 tablet (40 mg total) by mouth every evening., Disp: 90 tablet, Rfl: 3    HYDROcodone-acetaminophen (NORCO) 5-325 mg per tablet, Take 1 tablet by mouth every 6 (six) hours as needed for Pain. (Patient not taking: Reported on 2023), Disp: 15 tablet, Rfl: 0  No current facility-administered medications for this visit.    Facility-Administered Medications Ordered in Other Visits:     albuterol-ipratropium 2.5 mg-0.5 mg/3 mL nebulizer solution 3 mL, 3 mL, Nebulization, Once PRN, Malathi Live MD    diphenhydrAMINE injection 25 mg, 25 mg, Intravenous, Once PRN, Malathi Live MD    HYDROmorphone injection 0.2 mg, 0.2 mg, Intravenous, Q5 Min PRN, Malathi Live MD    HYDROmorphone injection 0.5 mg, 0.5 mg, Intravenous, Q5 Min PRN, Malathi Live MD, 0.5 mg at 23 0914    lactated ringers infusion, , Intravenous, Continuous, Malathi Live MD, Last Rate: 10 mL/hr at 23 0617, New Bag at 23 0617    LIDOcaine (PF) 10 mg/ml (1%) injection 10 mg, 1 mL, Intradermal, Once, Emma Hansen FNP    ondansetron injection 4 mg, 4 mg, Intravenous, Once, Malathi Live MD    prochlorperazine injection Soln 5 mg, 5 mg, Intravenous, Once PRN, Malathi Live MD  Allergies: Review of patient's allergies indicates:  No Known Allergies  Social History:   Social History     Tobacco Use    Smoking status: Former     Current packs/day: 0.00     Types: Cigarettes     Quit date: 2017     Years since quittin.6     Passive exposure: Current    Smokeless tobacco: Never   Substance Use Topics    Alcohol use: Never    Drug use: Never     Family History:   Family History   Problem Relation Age of Onset    Cancer Mother     Heart failure Father     Heart attack Father      Surgical History:   Past Surgical History:   Procedure Laterality Date    bilateral oophrectomy   1990    CATARACT EXTRACTION W/ INTRAOCULAR LENS IMPLANT Right 08/05/2021    CATARACT EXTRACTION W/ INTRAOCULAR LENS IMPLANT Left 07/13/2021    HYSTERECTOMY  1997    LAPAROSCOPIC CHOLECYSTECTOMY N/A 8/4/2023    Procedure: CHOLECYSTECTOMY, LAPAROSCOPIC;  Surgeon: Anton Marie Jr., MD;  Location: [facility];  Service: General;  Laterality: N/A;    ROBOT-ASSISTED LAPAROSCOPIC DONOR NEPHRECTOMY Right 2002     Review of Systems:  Skin: No rashes or itching.  Head: Denies headache or recent trauma.  Eyes: Denies eye pain or double vision.  Neck: Denies swelling or hoarseness of voice.  Respiratory: Denies shortness of breath or chest pain  Cardiac: Denies palpitations or swelling in hands/feet.  Gastrointestinal: Denies nausea, denies vomiting.   Urinary: Denies dysuria or hematuria.  Vascular: Denies claudication or leg swelling.  Neuro: Denies motor deficits. Denies weakness.  Endocrine: Denies excessive sweating or cold intolerance.  Psych: Denies memory problems. Denies anxiety.    Objective:    Vitals:  Vitals:    08/17/23 1058   BP: 115/81   Pulse: 82   Resp: 20   Temp: 98.2 °F (36.8 °C)        Physical Exam:  Gen: NAD  Neuro: awake, alert, answering questions appropriately  CV: RRR  Resp: non-labored breathing, RADHA  Abd: soft, ND, incisions well approximated, mild soreness epigastric region  Ext: moves all 4 spontaneously and purposefully  Skin: warm, well perfused      Micro/Path/Other:  Gallbladder, cholecystectomy:   - Chronic cholecystitis and cholelithiasis.        Assessment/Plan:  72F with COPD, CKD, HLD, HTN and DARRON s/p lap kole for bilary colic 8/4. Has been recovering well. For the last 48 hours has had some nausea and bloating, describes gastroenteritis. Otherwise feels well, reports regular stools.     - RTC PRN  - return precautions discussed    Libra Roman MD   [facility]  08/17/2023 11:33 AM

## 2023-09-19 ENCOUNTER — OFFICE VISIT (OUTPATIENT)
Dept: URGENT CARE | Facility: CLINIC | Age: 73
End: 2023-09-19

## 2023-09-19 VITALS
BODY MASS INDEX: 38.62 KG/M2 | HEART RATE: 82 BPM | DIASTOLIC BLOOD PRESSURE: 82 MMHG | RESPIRATION RATE: 18 BRPM | TEMPERATURE: 99 F | HEIGHT: 63 IN | SYSTOLIC BLOOD PRESSURE: 131 MMHG | OXYGEN SATURATION: 94 % | WEIGHT: 218 LBS

## 2023-09-19 DIAGNOSIS — R05.9 COUGH, UNSPECIFIED TYPE: ICD-10-CM

## 2023-09-19 DIAGNOSIS — J44.1 COPD EXACERBATION: Primary | ICD-10-CM

## 2023-09-19 LAB
CTP QC/QA: YES
SARS-COV-2 RDRP RESP QL NAA+PROBE: NEGATIVE

## 2023-09-19 PROCEDURE — 99214 OFFICE O/P EST MOD 30 MIN: CPT | Mod: PBBFAC | Performed by: FAMILY MEDICINE

## 2023-09-19 PROCEDURE — 87635 SARS-COV-2 COVID-19 AMP PRB: CPT | Mod: PBBFAC | Performed by: FAMILY MEDICINE

## 2023-09-19 PROCEDURE — 99203 OFFICE O/P NEW LOW 30 MIN: CPT | Mod: S$PBB,,, | Performed by: FAMILY MEDICINE

## 2023-09-19 PROCEDURE — 99203 PR OFFICE/OUTPT VISIT, NEW, LEVL III, 30-44 MIN: ICD-10-PCS | Mod: S$PBB,,, | Performed by: FAMILY MEDICINE

## 2023-09-19 RX ORDER — PREDNISONE 20 MG/1
20 TABLET ORAL 2 TIMES DAILY
Qty: 10 TABLET | Refills: 0 | Status: SHIPPED | OUTPATIENT
Start: 2023-09-19 | End: 2023-09-24

## 2023-09-19 RX ORDER — AZITHROMYCIN 250 MG/1
TABLET, FILM COATED ORAL
Qty: 6 TABLET | Refills: 0 | Status: SHIPPED | OUTPATIENT
Start: 2023-09-19 | End: 2023-09-23

## 2023-09-19 RX ORDER — PROMETHAZINE HYDROCHLORIDE AND DEXTROMETHORPHAN HYDROBROMIDE 6.25; 15 MG/5ML; MG/5ML
5 SYRUP ORAL
Qty: 120 ML | Refills: 0 | Status: SHIPPED | OUTPATIENT
Start: 2023-09-19 | End: 2023-11-28

## 2023-09-19 NOTE — PROGRESS NOTES
"Subjective:       Patient ID: Marisel Resendez is a 73 y.o. female.    Vitals:  height is 5' 3" (1.6 m) and weight is 98.9 kg (218 lb). Her oral temperature is 98.5 °F (36.9 °C). Her blood pressure is 131/82 and her pulse is 82. Her respiration is 18 and oxygen saturation is 94% (abnormal).     Chief Complaint: URI (Productive cough, chills . Hx of COPD)    Patient with a history of COPD, thinks she is having an exacerbation.  3 days of cough, occasional wheezing, no chest discomfort.  Small amount of sputum production.  No fever.  Has tolerated Zithromax and prednisone in the past for previous exacerbations.  Also requesting something for nocturnal cough      Constitutional: negative except as stated in HPI  Eye: negative except as stated in HPI  ENT: negative except as stated in HPI  Respiratory: negative except as stated in HPI  Cardiovascular: negative except as stated in HPI  Gastrointestinal: negative except as stated in HPI  Genitourinary: negative except as stated in HPI        Objective:   Physical Exam   Constitutional: She appears well-developed.  Non-toxic appearance. She does not appear ill. No distress.   HENT:   Head: Atraumatic.   Nose: No purulent discharge. Right sinus exhibits no maxillary sinus tenderness and no frontal sinus tenderness. Left sinus exhibits no maxillary sinus tenderness and no frontal sinus tenderness.   Mouth/Throat: Uvula is midline and mucous membranes are normal. No oropharyngeal exudate, posterior oropharyngeal edema or tonsillar abscesses.   Eyes: Right eye exhibits no discharge. Left eye exhibits no discharge. Extraocular movement intact   Neck: Neck supple. No neck rigidity present.   Cardiovascular: Regular rhythm.   Pulmonary/Chest: Effort normal. No respiratory distress. She has wheezes (Faint, scattered, good flow). She has no rhonchi. She has no rales.   Lymphadenopathy:     She has no cervical adenopathy.   Neurological: She is alert.   Skin: Skin is warm, dry and " not diaphoretic.   Psychiatric: Her behavior is normal.   Nursing note and vitals reviewed.    Results for orders placed or performed in visit on 09/19/23   POCT COVID-19 Rapid Screening   Result Value Ref Range    POC Rapid COVID Negative Negative     Acceptable Yes          Assessment:     1. COPD exacerbation    2. Cough, unspecified type            Plan:   Discussed COPD exacerbation.  Prescription for Zithromax, prednisone, Phenergan DM with side effect precautions.          Please follow instructions on patient education material.  Return to urgent care in 2 to 3 days if symptoms are not improving. Seek care immediately if new or worsening symptoms develop.  Consider repeat COVID testing if symptoms persist/worsen.      COPD exacerbation    Cough, unspecified type  -     POCT COVID-19 Rapid Screening    Other orders  -     promethazine-dextromethorphan (PROMETHAZINE-DM) 6.25-15 mg/5 mL Syrp; Take 5 mLs by mouth every 6 to 8 hours as needed (cough). May cause sedation.  Do not drive or operate heavy machinery.  Dispense: 120 mL; Refill: 0  -     predniSONE (DELTASONE) 20 MG tablet; Take 1 tablet (20 mg total) by mouth 2 (two) times daily. for 5 days  Dispense: 10 tablet; Refill: 0  -     azithromycin (Z-GRISELDA) 250 MG tablet; Take 2 tablets (500 mg total) by mouth once daily for 1 day, THEN 1 tablet (250 mg total) once daily for 4 days.  Dispense: 6 tablet; Refill: 0        Please note: This chart was completed via voice to text dictation. It may contain typographical/word recognition errors. If there are any questions, please contact the provider for final clarification.

## 2023-10-30 PROBLEM — Z00.00 WELLNESS EXAMINATION: Status: RESOLVED | Noted: 2022-07-22 | Resolved: 2023-10-30

## 2023-11-07 DIAGNOSIS — G47.33 OSA (OBSTRUCTIVE SLEEP APNEA): Primary | ICD-10-CM

## 2023-11-27 ENCOUNTER — HOSPITAL ENCOUNTER (OUTPATIENT)
Dept: RADIOLOGY | Facility: HOSPITAL | Age: 73
Discharge: HOME | End: 2023-11-27
Attending: NURSE PRACTITIONER

## 2023-11-27 DIAGNOSIS — Z12.31 ENCOUNTER FOR SCREENING MAMMOGRAM FOR MALIGNANT NEOPLASM OF BREAST: ICD-10-CM

## 2023-11-27 PROCEDURE — 77063 BREAST TOMOSYNTHESIS BI: CPT | Mod: 26,,, | Performed by: RADIOLOGY

## 2023-11-27 PROCEDURE — 77063 MAMMO DIGITAL SCREENING BILAT WITH TOMO: ICD-10-PCS | Mod: 26,,, | Performed by: RADIOLOGY

## 2023-11-27 PROCEDURE — 77067 SCR MAMMO BI INCL CAD: CPT | Mod: 26,,, | Performed by: RADIOLOGY

## 2023-11-27 PROCEDURE — 77067 MAMMO DIGITAL SCREENING BILAT WITH TOMO: ICD-10-PCS | Mod: 26,,, | Performed by: RADIOLOGY

## 2023-11-27 PROCEDURE — 77067 SCR MAMMO BI INCL CAD: CPT | Mod: TC

## 2023-11-28 ENCOUNTER — OFFICE VISIT (OUTPATIENT)
Dept: INTERNAL MEDICINE | Facility: CLINIC | Age: 73
End: 2023-11-28

## 2023-11-28 VITALS
HEIGHT: 63 IN | TEMPERATURE: 98 F | HEART RATE: 90 BPM | DIASTOLIC BLOOD PRESSURE: 83 MMHG | WEIGHT: 223.63 LBS | BODY MASS INDEX: 39.62 KG/M2 | RESPIRATION RATE: 20 BRPM | SYSTOLIC BLOOD PRESSURE: 133 MMHG

## 2023-11-28 DIAGNOSIS — I10 PRIMARY HYPERTENSION: ICD-10-CM

## 2023-11-28 DIAGNOSIS — E78.2 MIXED HYPERLIPIDEMIA: ICD-10-CM

## 2023-11-28 DIAGNOSIS — R73.03 PREDIABETES: ICD-10-CM

## 2023-11-28 DIAGNOSIS — Z23 NEED FOR INFLUENZA VACCINATION: ICD-10-CM

## 2023-11-28 DIAGNOSIS — N18.32 STAGE 3B CHRONIC KIDNEY DISEASE: ICD-10-CM

## 2023-11-28 DIAGNOSIS — Z00.00 WELLNESS EXAMINATION: ICD-10-CM

## 2023-11-28 DIAGNOSIS — J44.9 CHRONIC OBSTRUCTIVE PULMONARY DISEASE, UNSPECIFIED COPD TYPE: Primary | ICD-10-CM

## 2023-11-28 DIAGNOSIS — G47.33 OBSTRUCTIVE SLEEP APNEA SYNDROME: ICD-10-CM

## 2023-11-28 DIAGNOSIS — R60.0 BILATERAL LEG EDEMA: ICD-10-CM

## 2023-11-28 PROCEDURE — 90694 VACC AIIV4 NO PRSRV 0.5ML IM: CPT | Mod: PBBFAC

## 2023-11-28 PROCEDURE — 99214 OFFICE O/P EST MOD 30 MIN: CPT | Mod: S$PBB,,, | Performed by: NURSE PRACTITIONER

## 2023-11-28 PROCEDURE — 99214 OFFICE O/P EST MOD 30 MIN: CPT | Mod: PBBFAC | Performed by: NURSE PRACTITIONER

## 2023-11-28 PROCEDURE — 90471 IMMUNIZATION ADMIN: CPT | Mod: PBBFAC

## 2023-11-28 PROCEDURE — 99214 PR OFFICE/OUTPT VISIT, EST, LEVL IV, 30-39 MIN: ICD-10-PCS | Mod: S$PBB,,, | Performed by: NURSE PRACTITIONER

## 2023-11-28 RX ORDER — IPRATROPIUM BROMIDE AND ALBUTEROL SULFATE 2.5; .5 MG/3ML; MG/3ML
3 SOLUTION RESPIRATORY (INHALATION) 4 TIMES DAILY PRN
Qty: 75 ML | Refills: 6 | Status: SHIPPED | OUTPATIENT
Start: 2023-11-28

## 2023-11-28 RX ORDER — FUROSEMIDE 20 MG/1
20 TABLET ORAL DAILY PRN
Qty: 30 TABLET | Refills: 1 | Status: SHIPPED | OUTPATIENT
Start: 2023-11-28 | End: 2024-01-31

## 2023-11-28 RX ORDER — LOSARTAN POTASSIUM 50 MG/1
50 TABLET ORAL DAILY
Qty: 90 TABLET | Refills: 1 | Status: SHIPPED | OUTPATIENT
Start: 2023-11-28 | End: 2024-01-31 | Stop reason: SDUPTHER

## 2023-11-28 RX ORDER — ALBUTEROL SULFATE 90 UG/1
1 AEROSOL, METERED RESPIRATORY (INHALATION) EVERY 4 HOURS PRN
Qty: 6.7 G | Refills: 6 | Status: SHIPPED | OUTPATIENT
Start: 2023-11-28

## 2023-11-28 RX ORDER — ROSUVASTATIN CALCIUM 40 MG/1
40 TABLET, COATED ORAL NIGHTLY
Qty: 90 TABLET | Refills: 1 | Status: SHIPPED | OUTPATIENT
Start: 2023-11-28 | End: 2024-05-26

## 2023-11-28 RX ORDER — OMEPRAZOLE 20 MG/1
20 CAPSULE, DELAYED RELEASE ORAL DAILY
Qty: 90 CAPSULE | Refills: 1 | Status: SHIPPED | OUTPATIENT
Start: 2023-11-28 | End: 2024-05-26

## 2023-11-28 RX ADMIN — INFLUENZA A VIRUS A/VICTORIA/4897/2022 IVR-238 (H1N1) ANTIGEN (FORMALDEHYDE INACTIVATED), INFLUENZA A VIRUS A/DARWIN/6/2021 IVR-227 (H3N2) ANTIGEN (FORMALDEHYDE INACTIVATED), INFLUENZA B VIRUS B/AUSTRIA/1359417/2021 BVR-26 ANTIGEN (FORMALDEHYDE INACTIVATED), INFLUENZA B VIRUS B/PHUKET/3073/2013 BVR-1B ANTIGEN (FORMALDEHYDE INACTIVATED) 0.5 ML: 15; 15; 15; 15 INJECTION, SUSPENSION INTRAMUSCULAR at 08:11

## 2023-11-28 NOTE — ASSESSMENT & PLAN NOTE
HgA1c: 6  Prediabetes: 5.7%-6.4%  Diabetes: 6.5% or greater  Recommendations:  Educated on a diabetic diet- Low-fat, Low-carb, Low-cholesterol. Instructed to avoid excessive intake of sugary/dark drinks/sodas and white foods (i.e., bread, pasta, potatoes, rice).  Encouraged aerobic exercise: 20-30 min/day x 5 days/week.

## 2023-11-28 NOTE — PROGRESS NOTES
"BIRGIT Stafford   [facility]  [address] [facility] [id number]      PATIENT NAME: Marisel Resendez  : 1950  DATE: 23  MRN: [id number]        Reason for Visit / Chief Complaint: Follow-up (Lab review)       History of Present Illness / Problem Focused Workflow     Marisel Resendez presents to the clinic with Follow-up (Lab review)     Initial Visit (18): 68 y.o.  female presenting to the clinic to re-establish primary care. Previous PCP MELLY Delatorre NP. PMHx significant for HTN, HLD, CKD Stg III, Solitary Kidney (kidney donor), COPD and DARRON. Seen in Ortho Clinic in 2018 for R knee pain. Bp at goal today. Currently taking Losartan 25 mg po daily. Denies ADR. No refills needed at this time. FLP needed. LDL 78 (2017). Currently taking Atorvastatin 40 mg po daily. Tolerating well. Following Renal Clinic for CKD Stg III. Last OV 2018. Renal indices stable. COPD managed with Advair daily and Duonebs/ProAir inhaler as needed. She reports that she was hospitalized over the past year with pneumonia. She was given Spiriva in addition to her current medications. She does not take the Spiriva every day, only "when I feel I need it." Occasional cough with clear sputum. This is pt's norm. Reports taking Mucinex as needed and using spirometer as previously instructed. Hx of DARRON. Does not use CPAP d/t inability to tolerate. Sleeps with O2 qHS and reports awakening feeling "good and energized." Pt did express that she wanted to mention some forgetfulness lately. Admits occasionally beginning a task and forgetting to complete task. Has pulled out medication before but forgot to take it. Overall, she states, "I'm not concerned about it. I understand that I'm getting older. I just thought I would mention it so that we can monitor it." She is also concerned about a dry, erythemic patch to left side of nasal bridge that has been " "present for many years. She is concerned because she reports heavy sun exposure in younger years with inadequate sun protection. She would like region "checked out." Overall, pt reports that she feels "good." No longer a tobacco user. Uses tobacco creams/ointments PRN for psoriasis flares. States flares are rare. Denies fever, chills, HA, CP, SOB, Abd pain, Dysuria, B/B dysfunction, or any other concerns today.     5/8/19: 68 y.o.  female with PMHx significant for HTN, HLD, CKD Stg III, Solitary Kidney (kidney donor), COPD and DARRON, presenting for routine f/u. Bp at goal today. Currently taking Losartan 25 mg po daily. Renal indices stable. HgA1c 6.0%. . Pt recently had top teeth removed and dentures placed. States that she ate 1 cup of ice cream for an entire week. Pt believes this to be cause of elevated LDL. Plans to resume previous low-fat diet. Taking Atorvastatin 40 mg po daily. Tolerating well. Following Renal Clinic for CKD Stg III. Renal indices have been stable. Seen by Renal this am. Scheduled to f/u in 6 months. Hx of gallstones. Denies any recent abd complaints. COPD managed with Advair daily and Duonebs/ProAir inhaler as needed. Using Spiriva infrequently. Requesting med refills. Previously prescribed Fioricet for migraines. Reports significant improvement in migraines. Very pleased with medication effectiveness. Overall, pt reports that she's been feeling "great." Denies fever, chills, HA, CP, SOB, productive cough, Abd pain, Dysuria, B/B dysfunction, or any other concerns today.     (11/8/19): Ms. Joiner is presenting for a 6-mth f/u. PMHx significant for HTN, HLD, CKD Stg III, Solitary Kidney (kidney donor), COPD and DARRON. She was seen in Renal Clinic 10/9/19. Renal indices stable. She will return to Renal clinic in 6 mths. Alk remains slightly elevated. Hx of gallstones without any acute complaints. Bp at goal. HgA1c 5.9%. FLP WNL; LDL 58. COPD managed with Advair twice a day, Spiriva " as needed, and DuoNebs/Albuterol inhaler PRN. Mammo 10/25/19 negative. Bone Density 6/2018 WNL. Denies fever, chills, HA, CP, SOB, productive cough at present, Abd pain, Dysuria, B/B dysfunction, or any other concerns today.     ED F/u 1/6/20: Ms. Joiner is presenting for an ED f/u. PMHx significant for HTN, HLD, CKD Stg III, Solitary Kidney (kidney donor), COPD and DARRON. She presented to ED 12/30/19 with c/o cough, chills, and generalized body aches x 3 days. She thought she had the flu. Flu swab was negative. She was diagnosed with PNA. CXR revealed: Small, patchy opacity in the left lower lung may relate to atelectasis or pneumonia. She was given prescriptions for codeine-guaifenesin 10mg-100mg/5mL 5mL q6h prn cough and Levaquin 750 mg po daily x 5 days. She's completed the abx. Still using the cough syrup stating that cough and SOB with exertion is about the same. She's also c/o nasal congestion, HAs, and sinus pressure. O2 sat is 94% on RA. This is baseline for pt. She is afebrile at present. No other problems stated.     (7/24/2020): 69 y.o.  female with a PMHx significant for HTN, HLD, CKD Stg III, Solitary Kidney (kidney donor), psoriasis, OA, PNA, COPD and DARRON, presenting for routine f/u. Bp at goal today. Currently taking Losartan 25 mg po daily. Denies ADR. . Currently taking Atorvastatin 40 mg po daily. Tolerating well. Following Renal Clinic for CKD Stg III. Last OV 7/16/20. Renal indices stable. COPD managed with Advair daily and Duonebs/ProAir inhaler as needed. Uses Spiriva as needed. She endorses occasional dyspnea and wheezing with moderate exertion that resolves with rest. She is using O2 via NC at night and occasionally during a nap when she feels fatigued. Today, pt is c/o left hip pain x 2-3 weeks, occurring mainly at night when she lies flat. No known injuries. Pain throbbing and aching in quality. Moderate to severe intensity. Provoked by lying flat. Minimal relief with changing  "positions. Pain interferes with restful sleep. Unable to take NSAIDs. Has been "taking a lot of Tylenol lately" with no relief. Has used Tramadol in the past with some relief. States she was given Tramadol during an ED visit for pain in the past since she can't take "anything else because of my kidneys." She's otherwise doing well. Has started a Weight Watcher's program x 1 week. Has lost 3 lbs. No other problems stated.     Health Maintenance:   Colon Ca Screening-FIT 12/1/19, negative   Breast Ca Screening-Mammo 10/25/19, negative   Lung Ca Screening-declines     (1/25/2021): 70 y.o.  female with a PMHx significant for HTN, HLD, CKD Stg III, Solitary Kidney (kidney donor), psoriasis, OA, PNA, COPD and DARRON, presenting for routine f/u. Bp at goal today. Currently taking Losartan 25 mg po daily. Renal indices stable. She f/u with Renal via telemedicine last week. COPD managed with Advair daily and Duonebs/ProAir inhaler as needed. Uses Spiriva as needed. She endorses occasional dyspnea and wheezing with moderate exertion that resolves with rest. She is using O2 via NC at night and occasionally during a nap when she feels fatigued. She does report an increase in cough and mucous production over the last week that she attributes to the changing climate. She denies any abnl color to mucous, fever, chills, or worsening dyspnea. States sx improved with spirometer and CATE. On CPAP for DARRON. FLP WNL. Taking Atorvastatin and tolerating well. Previously referred to Sx clinic for gallstones. No appt today. C/o epigastric burning. Trying to avoid triggering foods. She had an eye exam at an outside clinic and was diagnosed with cataracts. States she'd noticed a change in vision, trouble seeing print close up, and cloudiness for a while. Needs a referral to ophthalmology. She is amenable to receiving the Shingrix vaccine. She denies any B/B complaints or falls. States feeling well. No other concerns.  Health " "Maintenance:   Colon Ca Screening-FIT 12/1/19, negative   Breast Ca Screening-Mammo 10/2020, negative   Lung Ca Screening-declines   Bone Density 10/2020, normal BMD     (7/26/2021): 70 y.o.  female with a PMHx significant for HTN, HLD, CKD Stg III, Solitary Kidney (kidney donor), psoriasis, OA, PNA, COPD and DARRON, presenting for routine f/u. Bp at goal today. Currently taking Losartan 25 mg po daily. Renal indices stable. No microalb on urine test. TSH WNL. She continues to follow renal clinic. COPD managed with Advair daily and Duonebs/ProAir inhaler as needed. Uses Spiriva as needed. She endorses occasional dyspnea and wheezing with moderate exertion that resolves with rest. She is using O2 via NC at night and occasionally during a nap when she feels fatigued. No changes from baseline. She would like a refill on benzonatate for PRN use cough. She's been evaluated in Sx clinic of gallstones. Due to higher surgical candidate risk given comorbidities, it was decided that pt would opt out of any unnecessary surgical procedures. Admits epigastric discomfort improves with omeprazole PRN. Screening mammo due 10/2021. Pt scheduled for cataract sx next month. She is requesting medication refills. No acute concerns.     (1/27/2022): 71 y.o.  female with a PMHx significant for HTN, HLD, CKD Stg III, Solitary Kidney (kidney donor), psoriasis, OA, PNA, COPD and DARRON, presenting for routine f/u. Bp at goal today. Currently taking Losartan 25 mg po daily. Renal indices stable. No microalb on urine test. She did see Renal Clinic 1/24/22. Noted stable with RTC 6 mths. Other labs reviewed and stable compared to baseline. Patient presented to ED on 1/7/22 with c/o left knee pain. Patient states her dogs hit the back of her left leg about three weeks ago and it "popped." Endorses pain to lateral aspect of knee and swelling. Has tried Tylenol for pain with no relief. Certain positions makes pain worse, movement makes " "pain better by decreasing "soreness and stiffness." In ER, XR of knee negative; prescribed prednisone and Voltaren. Appreciates a referral to Ortho. Denies falls. Reports "my lungs are doing well." She uses her spirometer regularly. Maintained with Advair and Devin PRN. No other concerns at this time.      Mammogram 11/2021 negative.     7/25/22: Patient presenting for routine f/u. VSS. Pt went to lab last week but only an A1c was drawn despite having labs orders in for her Renal appt today. A1c was WNL at 5.5. She reports that she's been feeling well. No new or worsening baseline dyspnea or chest pain. [facility] has been Ortho for OA to knees. S/p CSI which helped. Admits working on weight loss to improve knee stability and pain. Requesting refills on Advair and Benzonatate (states uses PRN cough). No recent COPD exacerbations. Due for FIT this month, DXA in October, and mammogram in November. She is amenable to all screenings. No other concerns.     1/25/23: Patient presenting for routine f/u. She contacted clinic about one week ago reporting Bp was above goal (150s-160s/80s-90s). She was taking Losartan 25 mg po daily. She was instructed to increase Losartan to 50 mg po daily. [facility] Bp has improved since doing so. Bp is at goal today. She is endorsing increased dyspnea (trouble ambulating 100ft or greater) and fatigue. Admits checking o2 saturation at home and noting numbers in the low 90s. States feels better when saturation is in the mid-90s. Admits having oxygen at home but primarily uses qHS due to fear of dependency. She does not endorse chest pain or increased LE edema. She does have a h/o DARRON but is not under treatment due to inability to tolerate face mask. She recently completed labs that were significant for an abnl FLP (worsened from previous). Admits following a low-fat diet and taking Atorvastatin 40 mg as prescribed. Other labs stable compared to baseline. She had a normal mammogram and DXA in " "November. She is amenable to pneumonia vaccine per recs. She has no other concerns.      4/25/23: Patient presenting for 3-month f/u. She was started on Trelegy at last visit. States work "great." She was able to get prescription assistance for the medication via the 's website. Completed PFTs and an Echo 2/28/23;     PFTs:   Nonspecific decrease in flow rates with no measured obstruction or restriction     Echo:  ·           The left ventricle is normal in size with normal systolic function.  ·           The estimated ejection fraction is 65%.  ·           Normal left ventricular diastolic function.  ·           Normal right ventricular size with normal right ventricular systolic function.  ·           Normal central venous pressure (3 mmHg).  ·           The estimated PA systolic pressure is 13 mmHg.  ·           IVC is normal.  ·           There is no pulmonary hypertension.  ·           Mild left atrial enlargement.     At last visit, she was to continue Losartan 50 mg po daily (previously changed). Doing well with BP WNL. And, she was started on Crestor for better HLD control. FLP improved significantly. LDL 70s as compared to 150s at last visit. Tolerating well.     She is c/o exacerbation of COPD sx.     Cough  This is a new problem. The current episode started 1 to 4 weeks ago. The problem has been waxing and waning. The problem occurs every few minutes. The cough is Productive of sputum. Associated symptoms include headaches, nasal congestion and wheezing. Pertinent negatives include no chest pain, chills, ear congestion, ear pain, fever, heartburn, hemoptysis, myalgias, postnasal drip, rash, rhinorrhea, sore throat, shortness of breath, sweats or weight loss. The symptoms are aggravated by cold air and pollens. She has tried a beta-agonist inhaler and prescription cough suppressant for the symptoms. The treatment provided mild relief. Her past medical history is significant for COPD. " "    5/3/23: Patient presenting for hospital f/u. She presented to the ED 4/28/23 with recurrent, squeezing, CP unrelieved at home. Bp was noted elevated as well after being WNL at routine office visit 4/25/23. Serial trop were negative. EKG and CXR unremarkable for any ischemic changes/acute cardiopulmonary processes. Note, she had an unremarkable Echo 2/2023 (see above). Due to persistently elevated hypertension, she was admitted overnight for observation. Her Losartan 50 mg daily was continued and Amlodipine 10 mg daily was added to regime. Patient discharged on 4/29/23 with referral to [facility], outpatient exercise [facility], and Cardiology outpatient. Her Lexiscan is scheduled 5/24/23. No appt in Cards yet. States after starting the Amlodipine initially, she felt "woozy" for a few days. Feeling better today but BP is lower end of normal. Also experiencing some leg fatigue, R>L, since hospitalization.    Of note, LFTs were noted trending upward throughout hospitalization and on day of discharge.    7/25/23: Patient presenting for routine f/u. At last visit, she was sent for an abd US due to elevated LFTs in hospital. She underwent US 5/22/23 which revealed a gallstone. She was referred to Sx clinic for eval. She was subsequently cleared and is now scheduled for sx 8/4/23 to have gallbladder removed. Recent CMP and UA stable compared to baseline.    She completed a FIT early May that was negative.    She is requesting a paper script of Trelegy to mail to  for prescription assistance.    H/o DARRON. Last study > 5 years ago. Patient has not used a CPAP due to unable to tolerate face mask. She continues with s/s of EDS (drowsiness, fatigue, nighttime awakenings). States interested in the nasal apparatus and would like to undergo another test to update her recommendations.     11/28/23: Patient presenting for routine f/u. She is s/p 8/4/2023 Laparoscopic cholecystectomy. Doing well. She saw Renal 7/31/23 " "and was taken off of Amlodipine 10 mg po daily due to LE edema. Continues with Losartan. Admits intermittent LE edema. States received Furosemide from renal in the past with some improvement. Asking for the same.     She had labs completed 11/27/23 which revealed slightly increased creatinine and BUN from July assessment, A1c 6.0%. Labs otherwise stable. She also completed a mammogram yesterday that was normal.     She was previously referred to sleep lab for repeat sleep study. See above. Her appt is scheduled 12/6/23.     Seen in [facility] in September for an acute COPD exacerbation. States, "they gave me the medicines I always get" and reports symptoms resolved. She is requesting medication refills.    No other concerns.                   Review of Systems     Review of Systems   Constitutional: Negative.    HENT: Negative.     Eyes: Negative.    Respiratory: Negative.  Negative for apnea, cough, choking, chest tightness, shortness of breath, wheezing and stridor.    Cardiovascular:  Positive for leg swelling.   Gastrointestinal: Negative.    Endocrine: Negative.    Genitourinary: Negative.    Musculoskeletal: Negative.    Skin: Negative.    Allergic/Immunologic: Negative.    Neurological: Negative.    Hematological: Negative.    Psychiatric/Behavioral: Negative.         Medical / Social / Family History     Past Medical History:   Diagnosis Date    Anxiety disorder, unspecified     CKD (chronic kidney disease)     COPD (chronic obstructive pulmonary disease)     Dyslipidemia     History of radical nephrectomy     HTN (hypertension)     DARRON (obstructive sleep apnea)        Past Surgical History:   Procedure Laterality Date    bilateral oophrectomy  1990    CATARACT EXTRACTION W/ INTRAOCULAR LENS IMPLANT Right 08/05/2021    CATARACT EXTRACTION W/ INTRAOCULAR LENS IMPLANT Left 07/13/2021    HYSTERECTOMY  1997    LAPAROSCOPIC CHOLECYSTECTOMY N/A 8/4/2023    Procedure: CHOLECYSTECTOMY, LAPAROSCOPIC;  Surgeon: Cynthia" 4 "Anton PORTILLO Jr., MD;  Location: [facility];  Service: General;  Laterality: N/A;    ROBOT-ASSISTED LAPAROSCOPIC DONOR NEPHRECTOMY Right 2002       Social History  Ms.  reports that she quit smoking about 6 years ago. Her smoking use included cigarettes. She has been exposed to tobacco smoke. She has never used smokeless tobacco. She reports that she does not drink alcohol and does not use drugs.    Family History  Ms.'s family history includes Cancer in her mother; Heart attack in her father; Heart failure in her father.    Medications and Allergies     Medications  Current Outpatient Medications   Medication Instructions    albuterol (PROVENTIL/VENTOLIN HFA) 90 mcg/actuation inhaler 1 puff, Inhalation, Every 4 hours PRN    albuterol-ipratropium (DUO-NEB) 2.5 mg-0.5 mg/3 mL nebulizer solution 3 mLs, Nebulization, 4 times daily PRN    benzonatate (TESSALON) 100 mg, Oral, 3 times daily PRN    calcium carbonate (OS-GINGER) 500 mg calcium (1,250 mg) tablet 1 tablet, Oral, 2 times daily    cetirizine (ZYRTEC) 10 mg, Oral, Daily    diclofenac sodium (VOLTAREN) 1 % Gel Topical, 4 times daily PRN    fluticasone-umeclidin-vilanter (TRELEGY ELLIPTA) 100-62.5-25 mcg DsDv 1 puff, Inhalation, Daily    furosemide (LASIX) 20 mg, Oral, Daily PRN    losartan (COZAAR) 50 mg, Oral, Daily    omeprazole (PRILOSEC) 20 mg, Oral, Daily    rosuvastatin (CRESTOR) 40 mg, Oral, Nightly       Allergies  Review of patient's allergies indicates:  No Known Allergies    Physical Examination   Visit Vitals  /83 (BP Location: Left arm, Patient Position: Sitting, BP Method: Large (Automatic))   Pulse 90   Temp 98.1 °F (36.7 °C) (Oral)   Resp 20   Ht 5' 3" (1.6 m)   Wt 101.4 kg (223 lb 9.6 oz)   BMI 39.61 kg/m²       Physical Exam  Constitutional:       Appearance: Normal appearance.   HENT:      Head: Normocephalic and atraumatic.      Right Ear: External ear normal.      Left Ear: External ear normal.   Eyes:      Extraocular Movements: Extraocular " movements intact.   Cardiovascular:      Rate and Rhythm: Normal rate and regular rhythm.      Pulses: Normal pulses.      Heart sounds: Normal heart sounds.   Pulmonary:      Effort: Pulmonary effort is normal.      Breath sounds: No rales.   Abdominal:      General: Bowel sounds are normal. There is no distension.      Palpations: Abdomen is soft. There is no mass.   Musculoskeletal:         General: Normal range of motion.      Left lower leg: Edema (mild, nonpitting) present.   Skin:     General: Skin is warm and dry.   Neurological:      General: No focal deficit present.      Mental Status: She is alert and oriented to person, place, and time.   Psychiatric:         Mood and Affect: Mood normal.         Behavior: Behavior normal.         Thought Content: Thought content normal.         Judgment: Judgment normal.           Results     Chemistry:  Lab Results   Component Value Date     11/27/2023    K 4.6 11/27/2023    CHLORIDE 107 11/27/2023    BUN 31.2 (H) 11/27/2023    CREATININE 1.71 (H) 11/27/2023    EGFRNORACEVR 32 11/27/2023    GLUCOSE 102 11/27/2023    CALCIUM 9.2 11/27/2023    ALKPHOS 138 11/27/2023    LABPROT 6.9 11/27/2023    ALBUMIN 3.9 11/27/2023    BILIDIR 0.2 01/11/2019    IBILI 0.3 01/11/2019    AST 25 11/27/2023    ALT 17 11/27/2023    MG 2.1 11/08/2018    PHOS 3.0 11/08/2018    COHOPJOK86NX 40.08 11/08/2018        Lab Results   Component Value Date    HGBA1C 6.0 11/27/2023        Hematology:  Lab Results   Component Value Date    WBC 7.25 11/27/2023    HGB 14.3 11/27/2023    HCT 44.0 11/27/2023     11/27/2023       Lipid Panel:  Lab Results   Component Value Date    CHOL 133 11/27/2023    HDL 51 11/27/2023    LDL 58.00 11/27/2023    TRIG 119 11/27/2023    TOTALCHOLEST 3 11/27/2023        Urine:  Lab Results   Component Value Date    COLORUA Light-Yellow 07/24/2023    APPEARANCEUA Clear 07/24/2023    SGUA 1.018 07/24/2023    PHUA 6.0 07/24/2023    PROTEINUA Negative 07/24/2023     GLUCOSEUA Normal 07/24/2023    KETONESUA Negative 07/24/2023    BLOODUA Negative 07/24/2023    NITRITESUA Negative 07/24/2023    LEUKOCYTESUR Negative 07/24/2023    RBCUA 0-5 07/24/2023    WBCUA 0-5 07/24/2023    BACTERIA None Seen 07/24/2023    SQEPUA Occ (A) 07/24/2023    HYALINECASTS None Seen 07/24/2023    CREATRANDUR 126.4 (H) 07/24/2023    PROTEINURINE 12.3 07/24/2023    UPROTCREA 0.1 07/24/2023          Assessment        ICD-10-CM ICD-9-CM   1. Chronic obstructive pulmonary disease, unspecified COPD type  J44.9 496   2. Need for influenza vaccination  Z23 V04.81   3. Stage 3b chronic kidney disease  N18.32 585.3   4. Primary hypertension  I10 401.9   5. Obstructive sleep apnea syndrome  G47.33 327.23   6. Bilateral leg edema  R60.0 782.3   7. Prediabetes  R73.03 790.29   8. Wellness examination  Z00.00 V70.0   9. Mixed hyperlipidemia  E78.2 272.2          Plan (including Health Maintenance)     Problem List Items Addressed This Visit          Pulmonary    Chronic obstructive pulmonary disease - Primary    Overview     PFTs 2/28/23: Nonspecific decrease in flow rates with no measured obstruction or restriction         Current Assessment & Plan     Doing much better on Trelegy. Continue.  Continue Devin PRN           Relevant Medications    fluticasone-umeclidin-vilanter (TRELEGY ELLIPTA) 100-62.5-25 mcg DsDv    albuterol-ipratropium (DUO-NEB) 2.5 mg-0.5 mg/3 mL nebulizer solution    albuterol (PROVENTIL/VENTOLIN HFA) 90 mcg/actuation inhaler       Cardiac/Vascular    Hyperlipidemia    Relevant Medications    rosuvastatin (CRESTOR) 40 MG Tab    Hypertension    Overview     Losartan 50 mg po daily         Current Assessment & Plan     Bp at goal   Continue Losartan 50 mg po daily for now as long as okay w/ Renal  Educated on aerobic exercise (3-5 days/week) and a low-fat, low-sodium diet  Avoid excess ETOH consumption. Smoking cessation if applicable  ED precautions (s/s of CVA, etc)             Relevant  Medications    losartan (COZAAR) 50 MG tablet       Renal/    Stage 3 chronic kidney disease    Current Assessment & Plan     Baseline BUN/Creatinine slightly increased from July assessment. Will collab with renal re: current Losartan and pt's request for furosemide for PRN edema to ext  Avoid NSAIDs (i.e., Advil, Aleve, Ibuprofen, Motrin, Naproxen, Diclofenac, Indocin, Celebrex, Mobic, Voltaren). Avoid the following GI meds: Milk of Mag, Mylanta, Fleets Enema, and Pepto-Bismol. Okay to take Tylenol (acetaminophen) (if no end-stage liver disease), low dose ASA (81 mg), Miralax, Tums, and Colace. Increase clear fluid intake. Avoid dark sodas.              Endocrine    Prediabetes    Current Assessment & Plan     HgA1c: 6  Prediabetes: 5.7%-6.4%  Diabetes: 6.5% or greater  Recommendations:  Educated on a diabetic diet- Low-fat, Low-carb, Low-cholesterol. Instructed to avoid excessive intake of sugary/dark drinks/sodas and white foods (i.e., bread, pasta, potatoes, rice).  Encouraged aerobic exercise: 20-30 min/day x 5 days/week.              Other    Obstructive sleep apnea syndrome    Current Assessment & Plan     Scheduled for sleep test 12/6/23         Wellness examination    Overview     Health Maintenance:   Colon Ca Screening-FIT 7/28/21, negative; 5/3/23, negative   Breast Ca Screening-Mammo 11/7/2022, negative; 11/27/23, negative   Lung Ca Screening-declines   Bone Density 11/7/2022, normal BMD           Bilateral leg edema    Current Assessment & Plan     Amlodipine stopped per Renal in July. States had been given furosemide for PRN use edema from renal in the past. Reached out to renal who is okay with Furosemide 20 mg po daily PRN edema  Advised compression stockings. Admits using in the past with effectiveness         Relevant Medications    furosemide (LASIX) 20 MG tablet     Other Visit Diagnoses       Need for influenza vaccination        Relevant Medications    influenza 65up-adj (QUADRIVALENT  ADJUVANTED PF) vaccine 0.5 mL (Completed)                Health Maintenance Due   Topic Date Due    RSV Vaccine (Age 60+ and Pregnant patients) (1 - 1-dose 60+ series) Never done    COVID-19 Vaccine (3 - 2023-24 season) 09/01/2023       Future Appointments   Date Time Provider Department Center   12/6/2023  7:00 PM SLEEP LAB  1, [facility] SLEEP [place]   12/22/2023  9:30 AM PROVIDERS, NATASHA KAUR [facility] [place]    1/31/2024  9:30 AM Isabel Riojas FNP [facility] [place] [facility]   4/2/2024  7:15 AM Teja Alejo FNP [facility]TM[facility] [place] [facility]      For any new or worsening symptoms that are urgent, or for any s/s of MI, CVA, or any other emergent concerns, please visit the ER for further eval. Otherwise, call clinic with questions or concerns.      Follow up in about 4 months (around 3/28/2024).    Signature:  BIRGIT Stafford  [facility]  [address] [id number]    Date of encounter: 11/28/23

## 2023-11-28 NOTE — ASSESSMENT & PLAN NOTE
Bp at goal   Continue Losartan 50 mg po daily for now as long as okay w/ Renal  Educated on aerobic exercise (3-5 days/week) and a low-fat, low-sodium diet  Avoid excess ETOH consumption. Smoking cessation if applicable  ED precautions (s/s of CVA, etc)

## 2023-11-28 NOTE — ASSESSMENT & PLAN NOTE
Amlodipine stopped per Renal in July. States had been given furosemide for PRN use edema from renal in the past. Reached out to renal who is okay with Furosemide 20 mg po daily PRN edema  Advised compression stockings. Admits using in the past with effectiveness

## 2023-11-28 NOTE — ASSESSMENT & PLAN NOTE
Baseline BUN/Creatinine slightly increased from July assessment. Will collab with renal re: current Losartan and pt's request for furosemide for PRN edema to ext  Avoid NSAIDs (i.e., Advil, Aleve, Ibuprofen, Motrin, Naproxen, Diclofenac, Indocin, Celebrex, Mobic, Voltaren). Avoid the following GI meds: Milk of Mag, Mylanta, Fleets Enema, and Pepto-Bismol. Okay to take Tylenol (acetaminophen) (if no end-stage liver disease), low dose ASA (81 mg), Miralax, Tums, and Colace. Increase clear fluid intake. Avoid dark sodas.

## 2023-12-06 ENCOUNTER — PROCEDURE VISIT (OUTPATIENT)
Dept: SLEEP MEDICINE | Facility: HOSPITAL | Age: 73
End: 2023-12-06
Attending: FAMILY MEDICINE

## 2023-12-06 DIAGNOSIS — G47.33 OSA (OBSTRUCTIVE SLEEP APNEA): ICD-10-CM

## 2023-12-06 PROCEDURE — 95810 POLYSOM 6/> YRS 4/> PARAM: CPT

## 2023-12-19 DIAGNOSIS — G47.33 OSA ON CPAP: Primary | ICD-10-CM

## 2023-12-20 ENCOUNTER — PROCEDURE VISIT (OUTPATIENT)
Dept: SLEEP MEDICINE | Facility: HOSPITAL | Age: 73
End: 2023-12-20
Attending: NURSE PRACTITIONER

## 2023-12-20 DIAGNOSIS — G47.33 OSA ON CPAP: ICD-10-CM

## 2023-12-20 PROCEDURE — 95811 POLYSOM 6/>YRS CPAP 4/> PARM: CPT

## 2023-12-22 ENCOUNTER — OFFICE VISIT (OUTPATIENT)
Dept: OPHTHALMOLOGY | Facility: CLINIC | Age: 73
End: 2023-12-22

## 2023-12-22 VITALS — WEIGHT: 223 LBS | BODY MASS INDEX: 39.51 KG/M2 | HEIGHT: 63 IN

## 2023-12-22 DIAGNOSIS — H18.513 CORNEAL GUTTATA OF BOTH EYES: ICD-10-CM

## 2023-12-22 DIAGNOSIS — H35.3131 EARLY DRY STAGE NONEXUDATIVE AGE-RELATED MACULAR DEGENERATION OF BOTH EYES: Primary | ICD-10-CM

## 2023-12-22 PROCEDURE — 92134 CPTRZ OPH DX IMG PST SGM RTA: CPT | Mod: PBBFAC,PN | Performed by: OPHTHALMOLOGY

## 2023-12-22 PROCEDURE — 99213 OFFICE O/P EST LOW 20 MIN: CPT | Mod: PBBFAC,PN

## 2023-12-22 NOTE — PROGRESS NOTES
HPI    RTC in 12 months for annual DFE, OCTmac  Patient states that she is having more blurry vision in the last two   months  Last edited by Kylee Forman MA on 12/22/2023 10:47 AM.        Assessment /Plan     For exam results, see Encounter Report.    Early dry stage nonexudative age-related macular degeneration of both eyes    Corneal guttata of both eyes               OCTmac  11/9/22  OD: 295; mild subfoveal drusen; no edema; FC intact  OS: 296; trace subfoveal drusen; no edema; FC intact    OCTmac  12/22/23  OD: 276; mild subfoveal drusen; no edema; FC intact  OS: 288; trace subfoveal drusen; no edema; FC intact    1. PCIOL OD  - Performed 8/5/21. Doing well  - MRX 9/2021  - Can follow up PRN.    2. PCIOL OS  - Performed 7/13/21.  - No visual complaints    3. ANDRES OU  - patient reports blurry vision with watching tv  - 12/22/23 vision fluctuates on refraction, BCVA 20/25 // 20/40; OCT mac flat, suspect dry eyes as the main cause of blurry vision; will start artificial tears QID, WC BID, devante qhs OU  - patient instructed to call if no improvement with above regimen    4. Guttae OU  - monitor, asymptomatic at this time  - Annual exam    5. Early Dry AMD, OU  - OCT with minimal drusen, correlates with exam; no edema  - CTM annual DFE and OCTm  - previous smoker; quit years ago.   - using Amsler grid, no changes    RTC in 12 months for annual DFE, OCTmac

## 2023-12-25 ENCOUNTER — HOSPITAL ENCOUNTER (EMERGENCY)
Facility: HOSPITAL | Age: 73
Discharge: HOME | End: 2023-12-25
Attending: FAMILY MEDICINE

## 2023-12-25 VITALS
WEIGHT: 222.25 LBS | RESPIRATION RATE: 20 BRPM | DIASTOLIC BLOOD PRESSURE: 77 MMHG | SYSTOLIC BLOOD PRESSURE: 136 MMHG | OXYGEN SATURATION: 91 % | BODY MASS INDEX: 39.38 KG/M2 | HEIGHT: 63 IN | HEART RATE: 105 BPM | TEMPERATURE: 99 F

## 2023-12-25 DIAGNOSIS — U07.1 COVID-19 VIRUS DETECTED: ICD-10-CM

## 2023-12-25 DIAGNOSIS — U07.1 COVID: Primary | ICD-10-CM

## 2023-12-25 LAB
ALBUMIN SERPL-MCNC: 3.5 G/DL (ref 3.4–4.8)
ALBUMIN/GLOB SERPL: 1.1 RATIO (ref 1.1–2)
ALP SERPL-CCNC: 126 UNIT/L (ref 40–150)
ALT SERPL-CCNC: 11 UNIT/L (ref 0–55)
AST SERPL-CCNC: 14 UNIT/L (ref 5–34)
BASOPHILS # BLD AUTO: 0.04 X10(3)/MCL
BASOPHILS NFR BLD AUTO: 0.5 %
BILIRUB SERPL-MCNC: 0.7 MG/DL
BNP BLD-MCNC: 64 PG/ML
BUN SERPL-MCNC: 19.1 MG/DL (ref 9.8–20.1)
CALCIUM SERPL-MCNC: 8.7 MG/DL (ref 8.4–10.2)
CHLORIDE SERPL-SCNC: 110 MMOL/L (ref 98–107)
CO2 SERPL-SCNC: 22 MMOL/L (ref 23–31)
CREAT SERPL-MCNC: 1.53 MG/DL (ref 0.55–1.02)
EOSINOPHIL # BLD AUTO: 0.31 X10(3)/MCL (ref 0–0.9)
EOSINOPHIL NFR BLD AUTO: 3.6 %
ERYTHROCYTE [DISTWIDTH] IN BLOOD BY AUTOMATED COUNT: 12.6 % (ref 11.5–17)
FLUAV AG UPPER RESP QL IA.RAPID: NOT DETECTED
FLUBV AG UPPER RESP QL IA.RAPID: NOT DETECTED
GFR SERPLBLD CREATININE-BSD FMLA CKD-EPI: 36 MLS/MIN/1.73/M2
GLOBULIN SER-MCNC: 3.2 GM/DL (ref 2.4–3.5)
GLUCOSE SERPL-MCNC: 88 MG/DL (ref 82–115)
HCT VFR BLD AUTO: 39.6 % (ref 37–47)
HGB BLD-MCNC: 12.9 G/DL (ref 12–16)
HOLD SPECIMEN: NORMAL
IMM GRANULOCYTES # BLD AUTO: 0.03 X10(3)/MCL (ref 0–0.04)
IMM GRANULOCYTES NFR BLD AUTO: 0.3 %
LYMPHOCYTES # BLD AUTO: 0.56 X10(3)/MCL (ref 0.6–4.6)
LYMPHOCYTES NFR BLD AUTO: 6.5 %
MCH RBC QN AUTO: 32 PG (ref 27–31)
MCHC RBC AUTO-ENTMCNC: 32.6 G/DL (ref 33–36)
MCV RBC AUTO: 98.3 FL (ref 80–94)
MONOCYTES # BLD AUTO: 0.63 X10(3)/MCL (ref 0.1–1.3)
MONOCYTES NFR BLD AUTO: 7.3 %
NEUTROPHILS # BLD AUTO: 7.05 X10(3)/MCL (ref 2.1–9.2)
NEUTROPHILS NFR BLD AUTO: 81.8 %
NRBC BLD AUTO-RTO: 0 %
PLATELET # BLD AUTO: 208 X10(3)/MCL (ref 130–400)
PMV BLD AUTO: 9.9 FL (ref 7.4–10.4)
POTASSIUM SERPL-SCNC: 4.4 MMOL/L (ref 3.5–5.1)
PROT SERPL-MCNC: 6.7 GM/DL (ref 5.8–7.6)
RBC # BLD AUTO: 4.03 X10(6)/MCL (ref 4.2–5.4)
RSV A 5' UTR RNA NPH QL NAA+PROBE: NOT DETECTED
SARS-COV-2 RNA RESP QL NAA+PROBE: DETECTED
SODIUM SERPL-SCNC: 142 MMOL/L (ref 136–145)
STREP A PCR (OHS): NOT DETECTED
TROPONIN I SERPL-MCNC: <0.01 NG/ML (ref 0–0.04)
WBC # SPEC AUTO: 8.62 X10(3)/MCL (ref 4.5–11.5)

## 2023-12-25 PROCEDURE — 25000242 PHARM REV CODE 250 ALT 637 W/ HCPCS: Performed by: PHYSICIAN ASSISTANT

## 2023-12-25 PROCEDURE — 84484 ASSAY OF TROPONIN QUANT: CPT | Performed by: PHYSICIAN ASSISTANT

## 2023-12-25 PROCEDURE — 94761 N-INVAS EAR/PLS OXIMETRY MLT: CPT

## 2023-12-25 PROCEDURE — 63600175 PHARM REV CODE 636 W HCPCS: Performed by: PHYSICIAN ASSISTANT

## 2023-12-25 PROCEDURE — 99284 EMERGENCY DEPT VISIT MOD MDM: CPT | Mod: 25

## 2023-12-25 PROCEDURE — 85025 COMPLETE CBC W/AUTO DIFF WBC: CPT | Performed by: PHYSICIAN ASSISTANT

## 2023-12-25 PROCEDURE — 80053 COMPREHEN METABOLIC PANEL: CPT | Performed by: PHYSICIAN ASSISTANT

## 2023-12-25 PROCEDURE — 96372 THER/PROPH/DIAG INJ SC/IM: CPT | Performed by: PHYSICIAN ASSISTANT

## 2023-12-25 PROCEDURE — 0241U COVID/RSV/FLU A&B PCR: CPT | Performed by: PHYSICIAN ASSISTANT

## 2023-12-25 PROCEDURE — 25000003 PHARM REV CODE 250: Performed by: PHYSICIAN ASSISTANT

## 2023-12-25 PROCEDURE — 87651 STREP A DNA AMP PROBE: CPT | Performed by: PHYSICIAN ASSISTANT

## 2023-12-25 PROCEDURE — 94640 AIRWAY INHALATION TREATMENT: CPT

## 2023-12-25 PROCEDURE — 83880 ASSAY OF NATRIURETIC PEPTIDE: CPT | Performed by: PHYSICIAN ASSISTANT

## 2023-12-25 RX ORDER — ACETAMINOPHEN 500 MG
1000 TABLET ORAL
Status: COMPLETED | OUTPATIENT
Start: 2023-12-25 | End: 2023-12-25

## 2023-12-25 RX ORDER — METHYLPREDNISOLONE SOD SUCC 125 MG
125 VIAL (EA) INJECTION
Status: COMPLETED | OUTPATIENT
Start: 2023-12-25 | End: 2023-12-25

## 2023-12-25 RX ORDER — PROMETHAZINE HYDROCHLORIDE AND DEXTROMETHORPHAN HYDROBROMIDE 6.25; 15 MG/5ML; MG/5ML
5 SYRUP ORAL EVERY 6 HOURS PRN
Qty: 100 ML | Refills: 0 | Status: SHIPPED | OUTPATIENT
Start: 2023-12-25 | End: 2023-12-30

## 2023-12-25 RX ORDER — IPRATROPIUM BROMIDE AND ALBUTEROL SULFATE 2.5; .5 MG/3ML; MG/3ML
6 SOLUTION RESPIRATORY (INHALATION)
Status: COMPLETED | OUTPATIENT
Start: 2023-12-25 | End: 2023-12-25

## 2023-12-25 RX ADMIN — ACETAMINOPHEN 1000 MG: 500 TABLET, FILM COATED ORAL at 06:12

## 2023-12-25 RX ADMIN — METHYLPREDNISOLONE SODIUM SUCCINATE 125 MG: 125 INJECTION, POWDER, FOR SOLUTION INTRAMUSCULAR; INTRAVENOUS at 06:12

## 2023-12-25 RX ADMIN — IPRATROPIUM BROMIDE AND ALBUTEROL SULFATE 6 ML: .5; 3 SOLUTION RESPIRATORY (INHALATION) at 07:12

## 2023-12-26 NOTE — ED PROVIDER NOTES
Encounter Date: 2023       History     Chief Complaint   Patient presents with    Cough     C/o productive clear mucous cough since Thursday, sob. Wheezing. States got worse today. Took 3 neb txs today but no relief     aMrisel Resendez is a 73 y.o. female with PMHx of COPD who presents to the ED with complaints of cough, fever, body aches, and wheezing that started 4 day(s) ago. She reports her symptoms started on Thursday and she began to feel better yesterday but states she woke up this morning and felt worse. Last nebulizer treatment was at 1:30pm. She states she has not taken any tylenol or ibuprofen today for fever. On 2L O2 at night.       The history is provided by the patient. No  was used.     Review of patient's allergies indicates:  No Known Allergies  Past Medical History:   Diagnosis Date    Anxiety disorder, unspecified     CKD (chronic kidney disease)     COPD (chronic obstructive pulmonary disease)     Dyslipidemia     History of radical nephrectomy     HTN (hypertension)     DARRON (obstructive sleep apnea)      Past Surgical History:   Procedure Laterality Date    bilateral oophrectomy      CATARACT EXTRACTION W/ INTRAOCULAR LENS IMPLANT Right 2021    CATARACT EXTRACTION W/ INTRAOCULAR LENS IMPLANT Left 2021    HYSTERECTOMY      LAPAROSCOPIC CHOLECYSTECTOMY N/A 2023    Procedure: CHOLECYSTECTOMY, LAPAROSCOPIC;  Surgeon: Anton Marie Jr., MD;  Location: [facility];  Service: General;  Laterality: N/A;    ROBOT-ASSISTED LAPAROSCOPIC DONOR NEPHRECTOMY Right      Family History   Problem Relation Age of Onset    Cancer Mother     Heart failure Father     Heart attack Father      Social History     Tobacco Use    Smoking status: Former     Current packs/day: 0.00     Types: Cigarettes     Quit date: 2017     Years since quittin.9     Passive exposure: Current    Smokeless tobacco: Never   Substance Use Topics    Alcohol use: Never    Drug  use: Never     Review of Systems   Constitutional:  Negative for chills, fatigue and fever.   HENT:  Negative for congestion, ear pain, sinus pain and sore throat.    Eyes:  Negative for pain.   Respiratory:  Positive for cough, shortness of breath and wheezing. Negative for chest tightness.    Cardiovascular:  Negative for chest pain.   Gastrointestinal:  Negative for abdominal pain, constipation, diarrhea, nausea and vomiting.   Genitourinary:  Negative for dysuria.   Musculoskeletal:  Negative for back pain and joint swelling.   Skin:  Negative for color change and rash.   Neurological:  Negative for dizziness and weakness.   Psychiatric/Behavioral:  Negative for behavioral problems and confusion.        Physical Exam     Initial Vitals [12/25/23 1813]   BP Pulse Resp Temp SpO2   (!) 164/78 (!) 113 (!) 24 99.5 °F (37.5 °C) (!) 91 %      MAP       --         Physical Exam    Constitutional: She appears well-developed and well-nourished.   HENT:   Head: Normocephalic and atraumatic.   Nose: Nose normal.   Eyes: EOM are normal. Pupils are equal, round, and reactive to light.   Neck: Neck supple. No thyromegaly present. No JVD present.   Normal range of motion.  Cardiovascular:  Normal rate, regular rhythm, normal heart sounds and intact distal pulses.           No murmur heard.  Pulmonary/Chest: No respiratory distress. She has wheezes (mild, in all lung fields). She has no rhonchi. She has no rales. She exhibits no tenderness.   Abdominal: Abdomen is soft. Bowel sounds are normal. She exhibits no distension. There is no abdominal tenderness. There is no rebound and no guarding.   Musculoskeletal:         General: No tenderness or edema. Normal range of motion.      Cervical back: Normal range of motion and neck supple.     Lymphadenopathy:     She has no cervical adenopathy.   Neurological: She is alert and oriented to person, place, and time.   Skin: Skin is warm and dry. Capillary refill takes less than 2  seconds.   Psychiatric: She has a normal mood and affect. Thought content normal.         ED Course   Procedures  Labs Reviewed   COMPREHENSIVE METABOLIC PANEL - Abnormal; Notable for the following components:       Result Value    Chloride 110 (*)     Carbon Dioxide 22 (*)     Creatinine 1.53 (*)     All other components within normal limits   COVID/RSV/FLU A&B PCR - Abnormal; Notable for the following components:    SARS-CoV-2 PCR Detected (*)     All other components within normal limits    Narrative:     CT=30.4  The Xpert Xpress SARS-CoV-2/FLU/RSV plus is a rapid, multiplexed real-time PCR test intended for the simultaneous qualitative detection and differentiation of SARS-CoV-2, Influenza A, Influenza B, and respiratory syncytial virus (RSV) viral RNA in either nasopharyngeal swab or nasal swab specimens.         CBC WITH DIFFERENTIAL - Abnormal; Notable for the following components:    RBC 4.03 (*)     MCV 98.3 (*)     MCH 32.0 (*)     MCHC 32.6 (*)     Lymph # 0.56 (*)     All other components within normal limits   TROPONIN I - Normal   B-TYPE NATRIURETIC PEPTIDE - Normal   STREP GROUP A BY PCR - Normal    Narrative:     The Xpert Xpress Strep A test is a rapid, qualitative in vitro diagnostic test for the detection of Streptococcus pyogenes (Group A ß-hemolytic Streptococcus, Strep A) in throat swab specimens from patients with signs and symptoms of pharyngitis.     CBC W/ AUTO DIFFERENTIAL    Narrative:     The following orders were created for panel order CBC auto differential.  Procedure                               Abnormality         Status                     ---------                               -----------         ------                     CBC with Differential[[id number]]        Abnormal            Final result                 Please view results for these tests on the individual orders.   EXTRA TUBES    Narrative:     The following orders were created for panel order EXTRA TUBES.  Procedure                                Abnormality         Status                     ---------                               -----------         ------                     Light Blue Top Hold[[id number]]                             Final result               Gold Top Hold[[id number]]                                   Final result               Pink Top Hold[[id number]]                                   Final result                 Please view results for these tests on the individual orders.   LIGHT BLUE TOP HOLD   GOLD TOP HOLD   PINK TOP HOLD          Imaging Results              X-Ray Chest 1 View (In process)                      Medications   albuterol-ipratropium 2.5 mg-0.5 mg/3 mL nebulizer solution 6 mL (6 mLs Nebulization Given 12/25/23 1905)   methylPREDNISolone sodium succinate injection 125 mg (125 mg Intramuscular Given 12/25/23 1842)   acetaminophen tablet 1,000 mg (1,000 mg Oral Given 12/25/23 1843)     Medical Decision Making  DDX: copd exacerbation, covid, flu, rsv, pneumonia, among others    Marisel Resendez is a 73 y.o. female with PMHx of COPD who presents to the ED with complaints of cough, fever, body aches, and wheezing that started 4 day(s) ago. She reports her symptoms started on Thursday and she began to feel better yesterday but states she woke up this morning and felt worse. Last nebulizer treatment was at 1:30pm. She states she has not taken any tylenol or ibuprofen today for fever. On 2L O2 at night. Covid test positive. Given 2 DuoNebs and Solumedrol 215 mg IM given in the ED. Patietn reports improvement of wheezing. Patient placed on home O2 and O2 sat 91-93%. Chest xray without pneumonia. Creatinine at baseline. Had shared decision making with patient and she wishes to go home. I have instructed her to use her home O2 if she feels short of breath. I have given her strict return precautions and she verbalized understanding. Stable for discharge.     Amount and/or Complexity of Data  Reviewed  Labs: ordered.  Radiology: ordered.    Risk  OTC drugs.  Prescription drug management.                                      Clinical Impression:  Final diagnoses:  [U07.1] COVID (Primary)          ED Disposition Condition    Discharge Stable          ED Prescriptions       Medication Sig Dispense Start Date End Date Auth. Provider    promethazine-dextromethorphan (PROMETHAZINE-DM) 6.25-15 mg/5 mL Syrp Take 5 mLs by mouth every 6 (six) hours as needed. 100 mL 12/25/2023 12/30/2023 Ledy Li PA-C          Follow-up Information       Follow up With Specialties Details Why Contact Info    Teja Alejo FNP Family Medicine   [address] [address]  [phone]      [facility] Emergency Medicine In 3 days As needed, If symptoms worsen [address] [id number] [phone]             Ledy Li PA-C  12/25/23 2036

## 2024-01-02 ENCOUNTER — HOSPITAL ENCOUNTER (INPATIENT)
Facility: HOSPITAL | Age: 74
LOS: 2 days | Discharge: HOME | DRG: 178 | End: 2024-01-04
Attending: INTERNAL MEDICINE | Admitting: INTERNAL MEDICINE
Payer: MEDICARE

## 2024-01-02 ENCOUNTER — NURSE TRIAGE (OUTPATIENT)
Dept: ADMINISTRATIVE | Facility: CLINIC | Age: 74
End: 2024-01-02

## 2024-01-02 DIAGNOSIS — R06.02 SOB (SHORTNESS OF BREATH): ICD-10-CM

## 2024-01-02 DIAGNOSIS — J18.9 PNEUMONIA OF RIGHT LUNG DUE TO INFECTIOUS ORGANISM, UNSPECIFIED PART OF LUNG: Primary | ICD-10-CM

## 2024-01-02 LAB
A-ADO2 BLOOD GAS (OHS): 54 MMHG
ALBUMIN SERPL-MCNC: 3.9 G/DL (ref 3.4–4.8)
ALBUMIN/GLOB SERPL: 1.1 RATIO (ref 1.1–2)
ALLENS TEST BLOOD GAS (OHS): YES
ALP SERPL-CCNC: 142 UNIT/L (ref 40–150)
ALT SERPL-CCNC: 26 UNIT/L (ref 0–55)
AST SERPL-CCNC: 31 UNIT/L (ref 5–34)
BASE EXCESS BLD CALC-SCNC: 3.1 MMOL/L (ref -2–2)
BASOPHILS # BLD AUTO: 0.05 X10(3)/MCL
BASOPHILS NFR BLD AUTO: 0.5 %
BILIRUB SERPL-MCNC: 0.7 MG/DL
BLOOD GAS SAMPLE TYPE (OHS): ABNORMAL
BNP BLD-MCNC: 43.9 PG/ML
BUN SERPL-MCNC: 18.9 MG/DL (ref 9.8–20.1)
CALCIUM SERPL-MCNC: 9.2 MG/DL (ref 8.4–10.2)
CHLORIDE SERPL-SCNC: 109 MMOL/L (ref 98–107)
CO2 BLDA-SCNC: 31.1 MMOL/L (ref 22–26)
CO2 SERPL-SCNC: 25 MMOL/L (ref 23–31)
COHGB MFR BLDA: 0.9 % (ref 0.5–1.5)
CREAT SERPL-MCNC: 1.31 MG/DL (ref 0.55–1.02)
DRAWN BY BLOOD GAS (OHS): ABNORMAL
EOSINOPHIL # BLD AUTO: 0.46 X10(3)/MCL (ref 0–0.9)
EOSINOPHIL NFR BLD AUTO: 4.9 %
ERYTHROCYTE [DISTWIDTH] IN BLOOD BY AUTOMATED COUNT: 12.4 % (ref 11.5–17)
FLUAV AG UPPER RESP QL IA.RAPID: NOT DETECTED
FLUBV AG UPPER RESP QL IA.RAPID: NOT DETECTED
GAS PNL BLD: 86 MMHG
GFR SERPLBLD CREATININE-BSD FMLA CKD-EPI: 44 MLS/MIN/1.73/M2
GLOBULIN SER-MCNC: 3.7 GM/DL (ref 2.4–3.5)
GLUCOSE SERPL-MCNC: 95 MG/DL (ref 82–115)
HCO3 BLDA-SCNC: 29.5 MMOL/L (ref 22–26)
HCT VFR BLD AUTO: 44.1 % (ref 37–47)
HGB BLD-MCNC: 14.2 G/DL (ref 12–16)
HOLD SPECIMEN: NORMAL
HOLD SPECIMEN: NORMAL
IMM GRANULOCYTES # BLD AUTO: 0.17 X10(3)/MCL (ref 0–0.04)
IMM GRANULOCYTES NFR BLD AUTO: 1.8 %
INHALED O2 CONCENTRATION: 21 %
LACTATE SERPL-SCNC: 1.1 MMOL/L (ref 0.5–2.2)
LYMPHOCYTES # BLD AUTO: 2 X10(3)/MCL (ref 0.6–4.6)
LYMPHOCYTES NFR BLD AUTO: 21.4 %
MCH RBC QN AUTO: 31.5 PG (ref 27–31)
MCHC RBC AUTO-ENTMCNC: 32.2 G/DL (ref 33–36)
MCV RBC AUTO: 97.8 FL (ref 80–94)
METHGB MFR BLDA: 0.6 % (ref 0–1.5)
MONOCYTES # BLD AUTO: 0.71 X10(3)/MCL (ref 0.1–1.3)
MONOCYTES NFR BLD AUTO: 7.6 %
NEUTROPHILS # BLD AUTO: 5.97 X10(3)/MCL (ref 2.1–9.2)
NEUTROPHILS NFR BLD AUTO: 63.8 %
NRBC BLD AUTO-RTO: 0 %
O2 HB BLOOD GAS (OHS): 59.4 % (ref 94–100)
OXYHGB MFR BLDA: 14.2 G/DL (ref 12–18)
PCO2 BLDA: 51 MMHG (ref 35–45)
PH BLDA: 7.37 [PH] (ref 7.35–7.45)
PLATELET # BLD AUTO: 261 X10(3)/MCL (ref 130–400)
PMV BLD AUTO: 9.9 FL (ref 7.4–10.4)
PO2 BLDA: 32 MMHG (ref 75–100)
POTASSIUM SERPL-SCNC: 4.7 MMOL/L (ref 3.5–5.1)
PROT SERPL-MCNC: 7.6 GM/DL (ref 5.8–7.6)
RBC # BLD AUTO: 4.51 X10(6)/MCL (ref 4.2–5.4)
RSV A 5' UTR RNA NPH QL NAA+PROBE: NOT DETECTED
SAMPLE SITE BLOOD GAS (OHS): ABNORMAL
SAO2 % BLDA: 60.2 %
SARS-COV-2 RNA RESP QL NAA+PROBE: NOT DETECTED
SODIUM SERPL-SCNC: 142 MMOL/L (ref 136–145)
TROPONIN I SERPL-MCNC: <0.01 NG/ML (ref 0–0.04)
WBC # SPEC AUTO: 9.36 X10(3)/MCL (ref 4.5–11.5)

## 2024-01-02 PROCEDURE — 21400001 HC TELEMETRY ROOM

## 2024-01-02 PROCEDURE — 96375 TX/PRO/DX INJ NEW DRUG ADDON: CPT

## 2024-01-02 PROCEDURE — 25000242 PHARM REV CODE 250 ALT 637 W/ HCPCS: Performed by: PHYSICIAN ASSISTANT

## 2024-01-02 PROCEDURE — 25000242 PHARM REV CODE 250 ALT 637 W/ HCPCS

## 2024-01-02 PROCEDURE — 99285 EMERGENCY DEPT VISIT HI MDM: CPT | Mod: 25

## 2024-01-02 PROCEDURE — 25000003 PHARM REV CODE 250: Performed by: PHYSICIAN ASSISTANT

## 2024-01-02 PROCEDURE — 63600175 PHARM REV CODE 636 W HCPCS: Performed by: PHYSICIAN ASSISTANT

## 2024-01-02 PROCEDURE — 99900035 HC TECH TIME PER 15 MIN (STAT)

## 2024-01-02 PROCEDURE — 63600175 PHARM REV CODE 636 W HCPCS

## 2024-01-02 PROCEDURE — 25000003 PHARM REV CODE 250: Performed by: INTERNAL MEDICINE

## 2024-01-02 PROCEDURE — 87040 BLOOD CULTURE FOR BACTERIA: CPT | Performed by: PHYSICIAN ASSISTANT

## 2024-01-02 PROCEDURE — 96365 THER/PROPH/DIAG IV INF INIT: CPT

## 2024-01-02 PROCEDURE — 83605 ASSAY OF LACTIC ACID: CPT | Performed by: PHYSICIAN ASSISTANT

## 2024-01-02 PROCEDURE — 93005 ELECTROCARDIOGRAM TRACING: CPT

## 2024-01-02 PROCEDURE — 36600 WITHDRAWAL OF ARTERIAL BLOOD: CPT

## 2024-01-02 PROCEDURE — 93005 ELECTROCARDIOGRAM TRACING: CPT | Performed by: INTERNAL MEDICINE

## 2024-01-02 PROCEDURE — 80053 COMPREHEN METABOLIC PANEL: CPT | Performed by: PHYSICIAN ASSISTANT

## 2024-01-02 PROCEDURE — 84484 ASSAY OF TROPONIN QUANT: CPT | Performed by: PHYSICIAN ASSISTANT

## 2024-01-02 PROCEDURE — 0241U COVID/RSV/FLU A&B PCR: CPT | Performed by: PHYSICIAN ASSISTANT

## 2024-01-02 PROCEDURE — 85025 COMPLETE CBC W/AUTO DIFF WBC: CPT | Performed by: PHYSICIAN ASSISTANT

## 2024-01-02 PROCEDURE — 82803 BLOOD GASES ANY COMBINATION: CPT

## 2024-01-02 PROCEDURE — 27000207 HC ISOLATION

## 2024-01-02 PROCEDURE — 94640 AIRWAY INHALATION TREATMENT: CPT

## 2024-01-02 PROCEDURE — 11000001 HC ACUTE MED/SURG PRIVATE ROOM

## 2024-01-02 PROCEDURE — 25000003 PHARM REV CODE 250

## 2024-01-02 PROCEDURE — 96367 TX/PROPH/DG ADDL SEQ IV INF: CPT

## 2024-01-02 PROCEDURE — 83880 ASSAY OF NATRIURETIC PEPTIDE: CPT | Performed by: PHYSICIAN ASSISTANT

## 2024-01-02 RX ORDER — IPRATROPIUM BROMIDE AND ALBUTEROL SULFATE 2.5; .5 MG/3ML; MG/3ML
3 SOLUTION RESPIRATORY (INHALATION) 4 TIMES DAILY PRN
Status: DISCONTINUED | OUTPATIENT
Start: 2024-01-02 | End: 2024-01-04 | Stop reason: HOSPADM

## 2024-01-02 RX ORDER — POLYETHYLENE GLYCOL 3350 17 G/17G
17 POWDER, FOR SOLUTION ORAL 2 TIMES DAILY PRN
Status: DISCONTINUED | OUTPATIENT
Start: 2024-01-02 | End: 2024-01-04 | Stop reason: HOSPADM

## 2024-01-02 RX ORDER — METHYLPREDNISOLONE SOD SUCC 125 MG
125 VIAL (EA) INJECTION
Status: COMPLETED | OUTPATIENT
Start: 2024-01-02 | End: 2024-01-02

## 2024-01-02 RX ORDER — CETIRIZINE HYDROCHLORIDE 10 MG/1
10 TABLET ORAL DAILY
Status: DISCONTINUED | OUTPATIENT
Start: 2024-01-02 | End: 2024-01-04 | Stop reason: HOSPADM

## 2024-01-02 RX ORDER — HEPARIN SODIUM 5000 [USP'U]/ML
5000 INJECTION, SOLUTION INTRAVENOUS; SUBCUTANEOUS EVERY 12 HOURS
Status: DISCONTINUED | OUTPATIENT
Start: 2024-01-02 | End: 2024-01-04 | Stop reason: HOSPADM

## 2024-01-02 RX ORDER — TALC
6 POWDER (GRAM) TOPICAL NIGHTLY PRN
Status: DISCONTINUED | OUTPATIENT
Start: 2024-01-02 | End: 2024-01-04 | Stop reason: HOSPADM

## 2024-01-02 RX ORDER — ALBUTEROL SULFATE 90 UG/1
1 AEROSOL, METERED RESPIRATORY (INHALATION) EVERY 4 HOURS PRN
Status: DISCONTINUED | OUTPATIENT
Start: 2024-01-02 | End: 2024-01-02

## 2024-01-02 RX ORDER — IPRATROPIUM BROMIDE AND ALBUTEROL SULFATE 2.5; .5 MG/3ML; MG/3ML
9 SOLUTION RESPIRATORY (INHALATION) ONCE
Status: COMPLETED | OUTPATIENT
Start: 2024-01-02 | End: 2024-01-02

## 2024-01-02 RX ORDER — SODIUM CHLORIDE 0.9 % (FLUSH) 0.9 %
10 SYRINGE (ML) INJECTION
Status: DISCONTINUED | OUTPATIENT
Start: 2024-01-02 | End: 2024-01-04 | Stop reason: HOSPADM

## 2024-01-02 RX ORDER — MUPIROCIN 20 MG/G
OINTMENT TOPICAL 2 TIMES DAILY
Status: DISCONTINUED | OUTPATIENT
Start: 2024-01-02 | End: 2024-01-04 | Stop reason: HOSPADM

## 2024-01-02 RX ORDER — PREDNISONE 20 MG/1
40 TABLET ORAL DAILY
Status: DISCONTINUED | OUTPATIENT
Start: 2024-01-02 | End: 2024-01-04 | Stop reason: HOSPADM

## 2024-01-02 RX ORDER — ATORVASTATIN CALCIUM 40 MG/1
80 TABLET, FILM COATED ORAL DAILY
Status: DISCONTINUED | OUTPATIENT
Start: 2024-01-02 | End: 2024-01-04 | Stop reason: HOSPADM

## 2024-01-02 RX ORDER — BENZONATATE 100 MG/1
100 CAPSULE ORAL 3 TIMES DAILY PRN
Status: DISCONTINUED | OUTPATIENT
Start: 2024-01-02 | End: 2024-01-04 | Stop reason: HOSPADM

## 2024-01-02 RX ORDER — ACETAMINOPHEN 325 MG/1
650 TABLET ORAL EVERY 4 HOURS PRN
Status: DISCONTINUED | OUTPATIENT
Start: 2024-01-02 | End: 2024-01-04 | Stop reason: HOSPADM

## 2024-01-02 RX ORDER — AZITHROMYCIN 250 MG/1
250 TABLET, FILM COATED ORAL DAILY
Status: DISCONTINUED | OUTPATIENT
Start: 2024-01-03 | End: 2024-01-04 | Stop reason: HOSPADM

## 2024-01-02 RX ORDER — LOSARTAN POTASSIUM 25 MG/1
50 TABLET ORAL DAILY
Status: DISCONTINUED | OUTPATIENT
Start: 2024-01-02 | End: 2024-01-04 | Stop reason: HOSPADM

## 2024-01-02 RX ADMIN — IPRATROPIUM BROMIDE AND ALBUTEROL SULFATE 9 ML: .5; 3 SOLUTION RESPIRATORY (INHALATION) at 11:01

## 2024-01-02 RX ADMIN — METHYLPREDNISOLONE SODIUM SUCCINATE 125 MG: 125 INJECTION, POWDER, FOR SOLUTION INTRAMUSCULAR; INTRAVENOUS at 11:01

## 2024-01-02 RX ADMIN — IPRATROPIUM BROMIDE AND ALBUTEROL SULFATE 3 ML: 2.5; .5 SOLUTION RESPIRATORY (INHALATION) at 08:01

## 2024-01-02 RX ADMIN — LOSARTAN POTASSIUM 50 MG: 25 TABLET, FILM COATED ORAL at 05:01

## 2024-01-02 RX ADMIN — AZITHROMYCIN MONOHYDRATE 500 MG: 500 INJECTION, POWDER, LYOPHILIZED, FOR SOLUTION INTRAVENOUS at 12:01

## 2024-01-02 RX ADMIN — ATORVASTATIN CALCIUM 80 MG: 40 TABLET, FILM COATED ORAL at 05:01

## 2024-01-02 RX ADMIN — CEFTRIAXONE SODIUM 1 G: 1 INJECTION, POWDER, FOR SOLUTION INTRAMUSCULAR; INTRAVENOUS at 02:01

## 2024-01-02 RX ADMIN — HEPARIN SODIUM 5000 UNITS: 5000 INJECTION, SOLUTION INTRAVENOUS; SUBCUTANEOUS at 09:01

## 2024-01-02 RX ADMIN — CETIRIZINE HYDROCHLORIDE 10 MG: 10 TABLET, FILM COATED ORAL at 05:01

## 2024-01-02 RX ADMIN — MUPIROCIN: 20 OINTMENT TOPICAL at 09:01

## 2024-01-02 NOTE — H&P
[facility] Medicine  History & Physical      Patient Name: Marisel Resendez  : 1950  MRN: [id number]  Patient Class: IP- Inpatient   Admission Date: 2024   Length of Stay: 0  Admitting Service: Hospital Medicine  Attending Physician: Yon Mccauley MD  PCP: Teja Alejo FNP  Source of history: Patient, patient's family, and EMR.   Code status: DNR    Chief Complaint   Shortness of Breath (PT W +COVID 23.  CO INCREASE IN SOB/WHEEZING/FATIGUE X 3 DAY.  EKG OBTAINED. )    History of Present Illness   73 y.o. female with  has a past medical history of Anxiety disorder, unspecified, CKD (chronic kidney disease), COPD (chronic obstructive pulmonary disease), Dyslipidemia, History of radical nephrectomy, HTN (hypertension), and DARRON (obstructive sleep apnea). presents to ED for SOB for last 3 days. Dx with COVID on  felt improved after duoneb and desired to go home. She states since returning home, she has felt progressively worsening shortness of breath and returned. Denies lightheadedness, chest pain, productive cough, hemoptysis. Lives with  at home, not on home o2. Home regimen includes Trelegy and prn albuterol. Does not use CPAP at night.     In ED, patient placed in venti mask and received duoneb, azithromycin, rocephin. CXR concernin for consolidation. Troponin and BNP negative. EKG NSR without changes with Qtc 408.     ROS   Pertinent positive and negative as mentioned in HPI   Review of Systems   Constitutional:  Negative for chills and fever.   HENT:  Negative for congestion and sore throat.    Eyes:  Negative for blurred vision and discharge.   Respiratory:  Positive for shortness of breath and wheezing. Negative for cough.    Cardiovascular:  Negative for chest pain.   Gastrointestinal:  Negative for constipation, diarrhea and vomiting.   Genitourinary:  Negative for dysuria and hematuria.   Skin:  Negative for rash.   Neurological:  Negative for  dizziness and seizures.   Endo/Heme/Allergies:  Does not bruise/bleed easily.   Psychiatric/Behavioral:  Negative for hallucinations.      Past Medical History     Past Medical History:   Diagnosis Date    Anxiety disorder, unspecified     CKD (chronic kidney disease)     COPD (chronic obstructive pulmonary disease)     Dyslipidemia     History of radical nephrectomy     HTN (hypertension)     DARRON (obstructive sleep apnea)      Past Surgical History     Past Surgical History:   Procedure Laterality Date    bilateral oophrectomy      CATARACT EXTRACTION W/ INTRAOCULAR LENS IMPLANT Right 2021    CATARACT EXTRACTION W/ INTRAOCULAR LENS IMPLANT Left 2021    HYSTERECTOMY      LAPAROSCOPIC CHOLECYSTECTOMY N/A 2023    Procedure: CHOLECYSTECTOMY, LAPAROSCOPIC;  Surgeon: Anton Marie Jr., MD;  Location: [facility];  Service: General;  Laterality: N/A;    ROBOT-ASSISTED LAPAROSCOPIC DONOR NEPHRECTOMY Right      Social History     Social History     Tobacco Use    Smoking status: Former     Current packs/day: 0.00     Types: Cigarettes     Quit date: 2017     Years since quittin.0     Passive exposure: Current    Smokeless tobacco: Never   Substance Use Topics    Alcohol use: Never      Family History   Reviewed and noncontributory    Allergies   Patient has no known allergies.  Home Medications     Prior to Admission medications    Medication Sig Start Date End Date Taking? Authorizing Provider   albuterol (PROVENTIL/VENTOLIN HFA) 90 mcg/actuation inhaler Inhale 1 puff into the lungs every 4 (four) hours as needed for Wheezing or Shortness of Breath. 23   Teja Alejo FNP   albuterol-ipratropium (DUO-NEB) 2.5 mg-0.5 mg/3 mL nebulizer solution Take 3 mLs by nebulization 4 (four) times daily as needed for Shortness of Breath or Wheezing. 23   Teja Alejo FNP   benzonatate (TESSALON) 100 MG capsule Take 1 capsule (100 mg total) by mouth 3 (three) times daily as needed  for Cough. 4/25/23   Teja Alejo FNP   calcium carbonate (OS-GINGER) 500 mg calcium (1,250 mg) tablet Take 1 tablet by mouth 2 (two) times daily.    Provider, Historical   cetirizine (ZYRTEC) 10 MG tablet Take 10 mg by mouth Daily.    Provider, Historical   diclofenac sodium (VOLTAREN) 1 % Gel Apply topically 4 (four) times daily as needed. 1/7/22   Provider, Historical   fluticasone-umeclidin-vilanter (TRELEGY ELLIPTA) 100-62.5-25 mcg DsDv Inhale 1 puff into the lungs once daily. 11/28/23   Teja Alejo FNP   furosemide (LASIX) 20 MG tablet Take 1 tablet (20 mg total) by mouth daily as needed (swelling). 11/28/23 12/28/23  Teja Alejo FNP   losartan (COZAAR) 50 MG tablet Take 1 tablet (50 mg total) by mouth once daily. 11/28/23 11/27/24  Teja Alejo FNP   omeprazole (PRILOSEC) 20 MG capsule Take 1 capsule (20 mg total) by mouth once daily. 11/28/23 5/26/24  Teja Alejo FNP   rosuvastatin (CRESTOR) 40 MG Tab Take 1 tablet (40 mg total) by mouth every evening. 11/28/23 5/26/24  Teja Alejo FNP      Inpatient Medications   Scheduled Meds   atorvastatin  80 mg Oral Daily    [START ON 1/3/2024] azithromycin  250 mg Oral Daily    cefTRIAXone (ROCEPHIN) IVPB  1 g Intravenous ED 1 Time    [START ON 1/3/2024] cefTRIAXone (ROCEPHIN) IVPB  1 g Intravenous Q24H    cetirizine  10 mg Oral Daily    heparin (porcine)  5,000 Units Subcutaneous Q12H    losartan  50 mg Oral Daily    predniSONE  40 mg Oral Daily     Continuous Infusions    PRN Meds  acetaminophen, albuterol, albuterol-ipratropium, benzonatate, melatonin, polyethylene glycol, sodium chloride 0.9%    Physical Exam   Vital Signs  Temp:  [97.5 °F (36.4 °C)]   Pulse:  [84-89]   Resp:  [20-22]   BP: (167-180)/()   SpO2:  [90 %-93 %]       General: Appears comfortable. Satting 92% on ventimask   HEENT: NC/AT  Neck:  No JVD  CVS: Regular rhythm. Normal S1/S2.  Abdomen: nondistended, normoactive BS, soft and non-tender.  MSK: No obvious  deformity or joint swelling  Skin: Warm and dry  Neuro: AAOx3, no focal neurological deficit. CN II-XII grossly intact   Psych: Cooperative    Labs   I have reviewed the following results below:  CBC  Recent Labs     01/02/24  1107   WBC 9.36   RBC 4.51   HGB 14.2   HCT 44.1   MCV 97.8*   MCH 31.5*   MCHC 32.2*   RDW 12.4        Cardiac  Recent Labs     01/02/24  1107   BNP 43.9   TROPONINI <0.010     ABG/Lactate  Recent Labs     01/02/24  1138 01/02/24  1225   PH 7.370  --    PCO2 51.0*  --    PO2 32.0*  --    HCO3 29.5*  --    LACTIC  --  1.1       BMP  Recent Labs     01/02/24  1107      K 4.7   CHLORIDE 109*   CO2 25   BUN 18.9   CREATININE 1.31*   GLUCOSE 95   CALCIUM 9.2     LFTs  Recent Labs     01/02/24  1107   ALBUMIN 3.9   GLOBULIN 3.7*   ALKPHOS 142   ALT 26   AST 31   BILITOT 0.7     Diabetes  Recent Labs     01/02/24  1107   GLUCOSE 95        Microbiology Results (last 7 days)       Procedure Component Value Units Date/Time    Blood Culture #2 **CANNOT BE ORDERED STAT** [[id number]] Collected: 01/02/24 1225    Order Status: Sent Specimen: Blood from Wrist, Left Updated: 01/02/24 1235    Blood Culture #1 **CANNOT BE ORDERED STAT** [[id number]] Collected: 01/02/24 1225    Order Status: Sent Specimen: Blood from Antecubital, Left Updated: 01/02/24 1235           Imaging     X-Ray Chest 1 View   Final Result      Confluent airspace opacities at the right base infiltrate cannot be excluded.      Atelectatic changes at the left base.      Prominence of the right hilum some of which might be related to slight rotation         Electronically signed by: Lane Duffy   Date:    01/02/2024   Time:    12:54        Assessment & Plan     COPD Exacerbation  PNA  COVID+   Likely secondary to COVID, dx 12/25   CXR reveal right lower lobe opacification  Received azithromycin 500mg and rocephin 1g in ED  Continue with PO azithromycin 250mg, IV rocephin 1g daily   Will obtain respiratory panel  Blood  culture pending  Duonebs Q4h   Received 125mg solumedrol in ED, continue with Prednisone 40mg daily   Maintain O2 between 88-92%  Tessalon pearls prn for cough      CKD  S/p Right Nephrectomy   S/p kidney donation   Cr 1.3, appears at baseline (baseline ~1.4)  CTM  Avoid nephrotoxic agents      HTN   Continue home medication losartan 50mg daily     HLD   Crestor 40mg not on formulary,   will start Lipitor 80mg while hospitalized.     Access: pIV  Antibiotics: rocephin and azithro (started 01/02)  Diet: heart healthy  DVT Prophylaxis: heparin   Fluids: none    Vaishnavi Salas MD  Family Medicine, [facility]

## 2024-01-02 NOTE — PLAN OF CARE
Problem: Adult Inpatient Plan of Care  Goal: Plan of Care Review  Outcome: Ongoing, Progressing  Flowsheets (Taken 1/2/2024 1627)  Plan of Care Reviewed With: patient  Goal: Patient-Specific Goal (Individualized)  Outcome: Ongoing, Progressing  Goal: Absence of Hospital-Acquired Illness or Injury  Outcome: Ongoing, Progressing  Intervention: Identify and Manage Fall Risk  Flowsheets (Taken 1/2/2024 1627)  Safety Promotion/Fall Prevention:   assistive device/personal item within reach   medications reviewed  Goal: Optimal Comfort and Wellbeing  Outcome: Ongoing, Progressing  Intervention: Provide Person-Centered Care  Flowsheets (Taken 1/2/2024 1627)  Trust Relationship/Rapport:   care explained   questions encouraged   questions answered  Goal: Readiness for Transition of Care  Outcome: Ongoing, Progressing  Intervention: Mutually Develop Transition Plan  Flowsheets (Taken 1/2/2024 1627)  Equipment Currently Used at Home:   oxygen   nebulizer  Do you expect to return to your current living situation?: Yes  Do you have help at home or someone to help you manage your care at home?: Yes

## 2024-01-02 NOTE — TELEPHONE ENCOUNTER
[facility] caller Marisel states she tested positive for Covid on 12/25/23. Has COPD. Was wearing O2 only at night but for the past week  having to wear O2 constantly. aMrisel states PsO2 is 89% on O2 and drops to 85% when not wearing O2 & feels out of breath constantly. Pt is SOB with talking. Advised pt per triage protocol to call  now. V/u.  Reason for Disposition   SEVERE difficulty breathing (e.g., struggling for each breath, speaks in single words)    Protocols used: Coronavirus (COVID-19) Diagnosed or [id number]-C-PT

## 2024-01-02 NOTE — ED PROVIDER NOTES
Encounter Date: 2024       History     Chief Complaint   Patient presents with    Shortness of Breath     PT W +COVID 23.  CO INCREASE IN SOB/WHEEZING/FATIGUE X 3 DAY.  EKG OBTAINED.      HPI  Review of patient's allergies indicates:  No Known Allergies  Past Medical History:   Diagnosis Date    Anxiety disorder, unspecified     CKD (chronic kidney disease)     COPD (chronic obstructive pulmonary disease)     Dyslipidemia     History of radical nephrectomy     HTN (hypertension)     DARRON (obstructive sleep apnea)      Past Surgical History:   Procedure Laterality Date    bilateral oophrectomy      CATARACT EXTRACTION W/ INTRAOCULAR LENS IMPLANT Right 2021    CATARACT EXTRACTION W/ INTRAOCULAR LENS IMPLANT Left 2021    HYSTERECTOMY      LAPAROSCOPIC CHOLECYSTECTOMY N/A 2023    Procedure: CHOLECYSTECTOMY, LAPAROSCOPIC;  Surgeon: Anton Marie Jr., MD;  Location: [facility];  Service: General;  Laterality: N/A;    ROBOT-ASSISTED LAPAROSCOPIC DONOR NEPHRECTOMY Right      Family History   Problem Relation Age of Onset    Cancer Mother     Heart failure Father     Heart attack Father      Social History     Tobacco Use    Smoking status: Former     Current packs/day: 0.00     Types: Cigarettes     Quit date: 2017     Years since quittin.0     Passive exposure: Current    Smokeless tobacco: Never   Substance Use Topics    Alcohol use: Never    Drug use: Never     Review of Systems    Physical Exam     Initial Vitals [24 1051]   BP Pulse Resp Temp SpO2   (!) 180/107 89 (!) 22 97.5 °F (36.4 °C) (!) 93 %      MAP       --         Physical Exam    ED Course   Procedures  Labs Reviewed   COMPREHENSIVE METABOLIC PANEL - Abnormal; Notable for the following components:       Result Value    Chloride 109 (*)     Creatinine 1.31 (*)     Globulin 3.7 (*)     All other components within normal limits   CBC WITH DIFFERENTIAL - Abnormal; Notable for the following components:    MCV  97.8 (*)     MCH 31.5 (*)     MCHC 32.2 (*)     IG# 0.17 (*)     All other components within normal limits   BLOOD GAS - Abnormal; Notable for the following components:    pCO2, Blood gas 51.0 (*)     pO2, Blood gas 32.0 (*)     TOC2, Blood gas 31.1 (*)     Base Excess, Blood gas 3.10 (*)     sO2, Blood gas 60.2 (*)     HCO3, Blood gas 29.5 (*)     O2 Hb, Blood Gas 59.4 (*)     All other components within normal limits   B-TYPE NATRIURETIC PEPTIDE - Normal   COVID/RSV/FLU A&B PCR - Normal    Narrative:     The Xpert Xpress SARS-CoV-2/FLU/RSV plus is a rapid, multiplexed real-time PCR test intended for the simultaneous qualitative detection and differentiation of SARS-CoV-2, Influenza A, Influenza B, and respiratory syncytial virus (RSV) viral RNA in either nasopharyngeal swab or nasal swab specimens.         TROPONIN I - Normal   LACTIC ACID, PLASMA - Normal   BLOOD CULTURE OLG   BLOOD CULTURE OLG   CBC W/ AUTO DIFFERENTIAL    Narrative:     The following orders were created for panel order CBC Auto Differential.  Procedure                               Abnormality         Status                     ---------                               -----------         ------                     CBC with Differential[[id number]]       Abnormal            Final result                 Please view results for these tests on the individual orders.   EXTRA TUBES    Narrative:     The following orders were created for panel order EXTRA TUBES.  Procedure                               Abnormality         Status                     ---------                               -----------         ------                     Light Blue Top Hold[[id number]]                             Final result               Gold Top Hold[[id number]]                                   Final result                 Please view results for these tests on the individual orders.   LIGHT BLUE TOP HOLD   GOLD TOP HOLD          Imaging Results              X-Ray  Chest 1 View (Final result)  Result time 01/02/24 12:54:58      Final result by Lane Duffy MD (01/02/24 12:54:58)                   Impression:      Confluent airspace opacities at the right base infiltrate cannot be excluded.    Atelectatic changes at the left base.    Prominence of the right hilum some of which might be related to slight rotation      Electronically signed by: Lane Duffy  Date:    01/02/2024  Time:    12:54               Narrative:    EXAMINATION:  XR CHEST 1 VIEW    CPT 28370    CLINICAL HISTORY:  cough, sob;    COMPARISON:  December 25, 2023    FINDINGS:  Examination reveals mediastinal silhouette to be within normal limits cardiac silhouette is not enlarged linear densities are identified at the left base likely related to atelectatic changes.    There is prominence of the right hilum some of which might be related to rotation.    Confluent opacities identified at the right base and right costophrenic angle infiltrate can not be completely excluded                                       Medications   albuterol inhaler 1 puff (has no administration in time range)   albuterol-ipratropium 2.5 mg-0.5 mg/3 mL nebulizer solution 3 mL (has no administration in time range)   benzonatate capsule 100 mg (has no administration in time range)   cetirizine tablet 10 mg (has no administration in time range)   losartan tablet 50 mg (has no administration in time range)   atorvastatin tablet 80 mg (has no administration in time range)   sodium chloride 0.9% flush 10 mL (has no administration in time range)   heparin (porcine) injection 5,000 Units (has no administration in time range)   azithromycin tablet 250 mg (has no administration in time range)   cefTRIAXone (ROCEPHIN) 1 g in dextrose 5 % in water (D5W) 100 mL IVPB (MB+) (has no administration in time range)   acetaminophen tablet 650 mg (has no administration in time range)   polyethylene glycol packet 17 g (has no administration in time  range)   melatonin tablet 6 mg (has no administration in time range)   predniSONE tablet 40 mg (40 mg Oral Not Given 1/2/24 1530)   albuterol-ipratropium 2.5 mg-0.5 mg/3 mL nebulizer solution 9 mL (9 mLs Nebulization Given 1/2/24 1112)   methylPREDNISolone sodium succinate injection 125 mg (125 mg Intravenous Given 1/2/24 1131)   cefTRIAXone (ROCEPHIN) 1 g in dextrose 5 % in water (D5W) 100 mL IVPB (MB+) (0 g Intravenous Stopped 1/2/24 1435)   azithromycin (ZITHROMAX) 500 mg in dextrose 5 % (D5W) 250 mL IVPB (Vial-Mate) (0 mg Intravenous Stopped 1/2/24 1351)     Medical Decision Making  Amount and/or Complexity of Data Reviewed  Labs: ordered.  Radiology: ordered.    Risk  Decision regarding hospitalization.              Attending Attestation:     Physician Attestation Statement for NP/PA:   I have directed and reviewed the workup performed by the PA/NP.  I performed the substantive portion of the medical decision making.     Other NP/PA Attestation Additions:    History of Present Illness: Shortness of breath, with home oxygen at night, requiring oxygen during the day, recent COVID infection   Physical Exam: Awake and alert, on oxygen, satting 93%, no respiratory distress                                     Clinical Impression:  Final diagnoses:  [R06.02] SOB (shortness of breath)  [J18.9] Pneumonia of right lung due to infectious organism, unspecified part of lung (Primary)          ED Disposition Condition    Admit                 Rajesh Martinez MD  01/02/24 3137

## 2024-01-02 NOTE — ED PROVIDER NOTES
Encounter Date: 2024       History     Chief Complaint   Patient presents with    Shortness of Breath     PT W +COVID 23.  CO INCREASE IN SOB/WHEEZING/FATIGUE X 3 DAY.  EKG OBTAINED.      Patient reports worsening shortness of breath, fatigue, and wheezing symptoms for the past several days; patient was diagnosed with COVID approximately x1 week ago    The history is provided by the patient.   Shortness of Breath  This is a recurrent problem. The current episode started more than 2 days ago. Associated symptoms include cough and wheezing. Pertinent negatives include no fever, no sore throat, no chest pain and no rash. Associated medical issues include COPD.     Review of patient's allergies indicates:  No Known Allergies  Past Medical History:   Diagnosis Date    Anxiety disorder, unspecified     CKD (chronic kidney disease)     COPD (chronic obstructive pulmonary disease)     Dyslipidemia     History of radical nephrectomy     HTN (hypertension)     DARRON (obstructive sleep apnea)      Past Surgical History:   Procedure Laterality Date    bilateral oophrectomy      CATARACT EXTRACTION W/ INTRAOCULAR LENS IMPLANT Right 2021    CATARACT EXTRACTION W/ INTRAOCULAR LENS IMPLANT Left 2021    HYSTERECTOMY      LAPAROSCOPIC CHOLECYSTECTOMY N/A 2023    Procedure: CHOLECYSTECTOMY, LAPAROSCOPIC;  Surgeon: Anton Marie Jr., MD;  Location: [facility];  Service: General;  Laterality: N/A;    ROBOT-ASSISTED LAPAROSCOPIC DONOR NEPHRECTOMY Right      Family History   Problem Relation Age of Onset    Cancer Mother     Heart failure Father     Heart attack Father      Social History     Tobacco Use    Smoking status: Former     Current packs/day: 0.00     Types: Cigarettes     Quit date: 2017     Years since quittin.0     Passive exposure: Current    Smokeless tobacco: Never   Substance Use Topics    Alcohol use: Never    Drug use: Never     Review of Systems   Constitutional:  Positive  for fatigue. Negative for fever.   HENT:  Negative for sore throat.    Eyes: Negative.    Respiratory:  Positive for cough, shortness of breath and wheezing.    Cardiovascular:  Negative for chest pain.   Gastrointestinal:  Negative for nausea.   Genitourinary:  Negative for dysuria.   Musculoskeletal:  Negative for back pain.   Skin:  Negative for rash.   Neurological:  Negative for weakness.   Hematological:  Does not bruise/bleed easily.   Psychiatric/Behavioral: Negative.         Physical Exam     Initial Vitals [01/02/24 1051]   BP Pulse Resp Temp SpO2   (!) 180/107 89 (!) 22 97.5 °F (36.4 °C) (!) 93 %      MAP       --         Physical Exam    Vitals reviewed.  Constitutional: She appears well-developed and well-nourished.   HENT:   Head: Normocephalic and atraumatic.   Eyes: Conjunctivae and EOM are normal. Pupils are equal, round, and reactive to light.   Neck: Neck supple.   Normal range of motion.  Cardiovascular:  Normal rate, regular rhythm, normal heart sounds and intact distal pulses.           Pulmonary/Chest: No respiratory distress. She has wheezes. She exhibits no tenderness.   Abdominal: Abdomen is soft. Bowel sounds are normal. She exhibits no distension. There is no abdominal tenderness.   Musculoskeletal:         General: Normal range of motion.      Cervical back: Normal range of motion and neck supple.     Neurological: She is alert and oriented to person, place, and time. She displays normal reflexes. No cranial nerve deficit or sensory deficit.   Skin: Skin is warm and dry.   Psychiatric: She has a normal mood and affect. Her behavior is normal. Judgment and thought content normal.         ED Course   Procedures  Labs Reviewed   COMPREHENSIVE METABOLIC PANEL - Abnormal; Notable for the following components:       Result Value    Chloride 109 (*)     Creatinine 1.31 (*)     Globulin 3.7 (*)     All other components within normal limits   CBC WITH DIFFERENTIAL - Abnormal; Notable for the  following components:    MCV 97.8 (*)     MCH 31.5 (*)     MCHC 32.2 (*)     IG# 0.17 (*)     All other components within normal limits   BLOOD GAS - Abnormal; Notable for the following components:    pCO2, Blood gas 51.0 (*)     pO2, Blood gas 32.0 (*)     TOC2, Blood gas 31.1 (*)     Base Excess, Blood gas 3.10 (*)     sO2, Blood gas 60.2 (*)     HCO3, Blood gas 29.5 (*)     O2 Hb, Blood Gas 59.4 (*)     All other components within normal limits   B-TYPE NATRIURETIC PEPTIDE - Normal   COVID/RSV/FLU A&B PCR - Normal    Narrative:     The XpmSnap Xpress SARS-CoV-2/FLU/RSV plus is a rapid, multiplexed real-time PCR test intended for the simultaneous qualitative detection and differentiation of SARS-CoV-2, Influenza A, Influenza B, and respiratory syncytial virus (RSV) viral RNA in either nasopharyngeal swab or nasal swab specimens.         TROPONIN I - Normal   LACTIC ACID, PLASMA - Normal   BLOOD CULTURE OLG   BLOOD CULTURE OLG   CBC W/ AUTO DIFFERENTIAL    Narrative:     The following orders were created for panel order CBC Auto Differential.  Procedure                               Abnormality         Status                     ---------                               -----------         ------                     CBC with Differential[[id number]]       Abnormal            Final result                 Please view results for these tests on the individual orders.   EXTRA TUBES    Narrative:     The following orders were created for panel order EXTRA TUBES.  Procedure                               Abnormality         Status                     ---------                               -----------         ------                     Light Blue Top Hold[[id number]]                             Final result               Gold Top Hold[[id number]]                                   Final result                 Please view results for these tests on the individual orders.   LIGHT BLUE TOP HOLD   GOLD TOP HOLD     EKG Readings:  (Independently Interpreted)   Initial Reading: No STEMI. Rhythm: Normal Sinus Rhythm. Heart Rate: 83. Ectopy: No Ectopy. Conduction: Normal. ST Segments: Normal ST Segments. T Waves: Normal. Clinical Impression: Normal Sinus Rhythm       Imaging Results              X-Ray Chest 1 View (Final result)  Result time 01/02/24 12:54:58      Final result by Lane Duffy MD (01/02/24 12:54:58)                   Impression:      Confluent airspace opacities at the right base infiltrate cannot be excluded.    Atelectatic changes at the left base.    Prominence of the right hilum some of which might be related to slight rotation      Electronically signed by: Lane Duffy  Date:    01/02/2024  Time:    12:54               Narrative:    EXAMINATION:  XR CHEST 1 VIEW    CPT 37847    CLINICAL HISTORY:  cough, sob;    COMPARISON:  December 25, 2023    FINDINGS:  Examination reveals mediastinal silhouette to be within normal limits cardiac silhouette is not enlarged linear densities are identified at the left base likely related to atelectatic changes.    There is prominence of the right hilum some of which might be related to rotation.    Confluent opacities identified at the right base and right costophrenic angle infiltrate can not be completely excluded                                       Medications   albuterol inhaler 1 puff (has no administration in time range)   albuterol-ipratropium 2.5 mg-0.5 mg/3 mL nebulizer solution 3 mL (has no administration in time range)   benzonatate capsule 100 mg (has no administration in time range)   cetirizine tablet 10 mg (has no administration in time range)   losartan tablet 50 mg (has no administration in time range)   atorvastatin tablet 80 mg (has no administration in time range)   sodium chloride 0.9% flush 10 mL (has no administration in time range)   heparin (porcine) injection 5,000 Units (has no administration in time range)   azithromycin tablet 250 mg (has no  administration in time range)   cefTRIAXone (ROCEPHIN) 1 g in dextrose 5 % in water (D5W) 100 mL IVPB (MB+) (has no administration in time range)   acetaminophen tablet 650 mg (has no administration in time range)   polyethylene glycol packet 17 g (has no administration in time range)   melatonin tablet 6 mg (has no administration in time range)   predniSONE tablet 40 mg (has no administration in time range)   albuterol-ipratropium 2.5 mg-0.5 mg/3 mL nebulizer solution 9 mL (9 mLs Nebulization Given 1/2/24 1112)   methylPREDNISolone sodium succinate injection 125 mg (125 mg Intravenous Given 1/2/24 1131)   cefTRIAXone (ROCEPHIN) 1 g in dextrose 5 % in water (D5W) 100 mL IVPB (MB+) (0 g Intravenous Stopped 1/2/24 1435)   azithromycin (ZITHROMAX) 500 mg in dextrose 5 % (D5W) 250 mL IVPB (Vial-Mate) (0 mg Intravenous Stopped 1/2/24 1351)     Medical Decision Making  Amount and/or Complexity of Data Reviewed  Labs: ordered.  Radiology: ordered.  Discussion of management or test interpretation with external provider(s): With Dr. Marinelli of Internal Medicine who will evaluate the patient for admit    Risk  Decision regarding hospitalization.                                      Clinical Impression:  Final diagnoses:  [R06.02] SOB (shortness of breath)  [J18.9] Pneumonia of right lung due to infectious organism, unspecified part of lung (Primary)          ED Disposition Condition    Admit                 Stephen Chambers PA  01/02/24 2217

## 2024-01-03 LAB
ALBUMIN SERPL-MCNC: 3.6 G/DL (ref 3.4–4.8)
ALBUMIN/GLOB SERPL: 1.1 RATIO (ref 1.1–2)
ALP SERPL-CCNC: 119 UNIT/L (ref 40–150)
ALT SERPL-CCNC: 23 UNIT/L (ref 0–55)
AST SERPL-CCNC: 24 UNIT/L (ref 5–34)
BASOPHILS # BLD AUTO: 0.02 X10(3)/MCL
BASOPHILS NFR BLD AUTO: 0.1 %
BILIRUB SERPL-MCNC: 0.6 MG/DL
BUN SERPL-MCNC: 20.8 MG/DL (ref 9.8–20.1)
CALCIUM SERPL-MCNC: 9.4 MG/DL (ref 8.4–10.2)
CHLORIDE SERPL-SCNC: 110 MMOL/L (ref 98–107)
CO2 SERPL-SCNC: 24 MMOL/L (ref 23–31)
CREAT SERPL-MCNC: 1.18 MG/DL (ref 0.55–1.02)
EOSINOPHIL # BLD AUTO: 0 X10(3)/MCL (ref 0–0.9)
EOSINOPHIL NFR BLD AUTO: 0 %
ERYTHROCYTE [DISTWIDTH] IN BLOOD BY AUTOMATED COUNT: 12.3 % (ref 11.5–17)
GFR SERPLBLD CREATININE-BSD FMLA CKD-EPI: 49 MLS/MIN/1.73/M2
GLOBULIN SER-MCNC: 3.4 GM/DL (ref 2.4–3.5)
GLUCOSE SERPL-MCNC: 137 MG/DL (ref 82–115)
HCT VFR BLD AUTO: 42 % (ref 37–47)
HGB BLD-MCNC: 13.8 G/DL (ref 12–16)
IMM GRANULOCYTES # BLD AUTO: 0.21 X10(3)/MCL (ref 0–0.04)
IMM GRANULOCYTES NFR BLD AUTO: 1.5 %
LYMPHOCYTES # BLD AUTO: 0.98 X10(3)/MCL (ref 0.6–4.6)
LYMPHOCYTES NFR BLD AUTO: 7.2 %
MAGNESIUM SERPL-MCNC: 2 MG/DL (ref 1.6–2.6)
MCH RBC QN AUTO: 31.6 PG (ref 27–31)
MCHC RBC AUTO-ENTMCNC: 32.9 G/DL (ref 33–36)
MCV RBC AUTO: 96.1 FL (ref 80–94)
MONOCYTES # BLD AUTO: 0.55 X10(3)/MCL (ref 0.1–1.3)
MONOCYTES NFR BLD AUTO: 4 %
NEUTROPHILS # BLD AUTO: 11.85 X10(3)/MCL (ref 2.1–9.2)
NEUTROPHILS NFR BLD AUTO: 87.2 %
NRBC BLD AUTO-RTO: 0 %
PHOSPHATE SERPL-MCNC: 2.5 MG/DL (ref 2.3–4.7)
PLATELET # BLD AUTO: 244 X10(3)/MCL (ref 130–400)
PMV BLD AUTO: 9.9 FL (ref 7.4–10.4)
POTASSIUM SERPL-SCNC: 4.9 MMOL/L (ref 3.5–5.1)
PROT SERPL-MCNC: 7 GM/DL (ref 5.8–7.6)
RBC # BLD AUTO: 4.37 X10(6)/MCL (ref 4.2–5.4)
SODIUM SERPL-SCNC: 141 MMOL/L (ref 136–145)
WBC # SPEC AUTO: 13.61 X10(3)/MCL (ref 4.5–11.5)

## 2024-01-03 PROCEDURE — 83735 ASSAY OF MAGNESIUM: CPT

## 2024-01-03 PROCEDURE — 25000003 PHARM REV CODE 250

## 2024-01-03 PROCEDURE — 80053 COMPREHEN METABOLIC PANEL: CPT

## 2024-01-03 PROCEDURE — 84100 ASSAY OF PHOSPHORUS: CPT

## 2024-01-03 PROCEDURE — 27100171 HC OXYGEN HIGH FLOW UP TO 24 HOURS

## 2024-01-03 PROCEDURE — 99900035 HC TECH TIME PER 15 MIN (STAT)

## 2024-01-03 PROCEDURE — 85025 COMPLETE CBC W/AUTO DIFF WBC: CPT

## 2024-01-03 PROCEDURE — 63600175 PHARM REV CODE 636 W HCPCS

## 2024-01-03 PROCEDURE — 94640 AIRWAY INHALATION TREATMENT: CPT

## 2024-01-03 PROCEDURE — 21400001 HC TELEMETRY ROOM

## 2024-01-03 PROCEDURE — 63700000 PHARM REV CODE 250 ALT 637 W/O HCPCS

## 2024-01-03 PROCEDURE — 25000242 PHARM REV CODE 250 ALT 637 W/ HCPCS

## 2024-01-03 PROCEDURE — 94761 N-INVAS EAR/PLS OXIMETRY MLT: CPT

## 2024-01-03 RX ORDER — GUAIFENESIN/DEXTROMETHORPHAN 100-10MG/5
5 SYRUP ORAL EVERY 4 HOURS PRN
Status: CANCELLED | OUTPATIENT
Start: 2024-01-03

## 2024-01-03 RX ORDER — GUAIFENESIN/DEXTROMETHORPHAN 100-10MG/5
5 SYRUP ORAL EVERY 4 HOURS PRN
Status: DISCONTINUED | OUTPATIENT
Start: 2024-01-03 | End: 2024-01-04 | Stop reason: HOSPADM

## 2024-01-03 RX ORDER — HYDRALAZINE HYDROCHLORIDE 20 MG/ML
10 INJECTION INTRAMUSCULAR; INTRAVENOUS EVERY 6 HOURS PRN
Status: DISCONTINUED | OUTPATIENT
Start: 2024-01-03 | End: 2024-01-04 | Stop reason: HOSPADM

## 2024-01-03 RX ADMIN — CEFTRIAXONE SODIUM 1 G: 1 INJECTION, POWDER, FOR SOLUTION INTRAMUSCULAR; INTRAVENOUS at 11:01

## 2024-01-03 RX ADMIN — CEFTRIAXONE SODIUM 1 G: 1 INJECTION, POWDER, FOR SOLUTION INTRAMUSCULAR; INTRAVENOUS at 01:01

## 2024-01-03 RX ADMIN — HEPARIN SODIUM 5000 UNITS: 5000 INJECTION, SOLUTION INTRAVENOUS; SUBCUTANEOUS at 09:01

## 2024-01-03 RX ADMIN — PREDNISONE 40 MG: 20 TABLET ORAL at 09:01

## 2024-01-03 RX ADMIN — LOSARTAN POTASSIUM 50 MG: 25 TABLET, FILM COATED ORAL at 09:01

## 2024-01-03 RX ADMIN — AZITHROMYCIN MONOHYDRATE 250 MG: 250 TABLET ORAL at 09:01

## 2024-01-03 RX ADMIN — IPRATROPIUM BROMIDE AND ALBUTEROL SULFATE 3 ML: 2.5; .5 SOLUTION RESPIRATORY (INHALATION) at 08:01

## 2024-01-03 RX ADMIN — METHYLPREDNISOLONE SODIUM SUCCINATE 60 MG: 40 INJECTION, POWDER, FOR SOLUTION INTRAMUSCULAR; INTRAVENOUS at 05:01

## 2024-01-03 RX ADMIN — ATORVASTATIN CALCIUM 80 MG: 40 TABLET, FILM COATED ORAL at 09:01

## 2024-01-03 RX ADMIN — MUPIROCIN: 20 OINTMENT TOPICAL at 09:01

## 2024-01-03 RX ADMIN — IPRATROPIUM BROMIDE AND ALBUTEROL SULFATE 3 ML: 2.5; .5 SOLUTION RESPIRATORY (INHALATION) at 07:01

## 2024-01-03 RX ADMIN — GUAIFENESIN AND DEXTROMETHORPHAN 5 ML: 100; 10 SYRUP ORAL at 09:01

## 2024-01-03 RX ADMIN — CETIRIZINE HYDROCHLORIDE 10 MG: 10 TABLET, FILM COATED ORAL at 05:01

## 2024-01-03 NOTE — PROGRESS NOTES
"Inpatient Nutrition Evaluation    Admit Date: 1/2/2024   Total duration of encounter: 1 day   Patient Age: 73 y.o.    Nutrition Recommendation/Prescription     Low Na diet  Monitor Weight Weekly     Nutrition Assessment     Chart Review    Reason Seen: continuous nutrition monitoring    Malnutrition Screening Tool Results   Have you recently lost weight without trying?: No  Have you been eating poorly because of a decreased appetite?: No   MST Score: 0   Diagnosis:  COPD exacerbation, PNA, COVID +, CKD s/p right nephrectomy, HTN, HLD    Relevant Medical History: anxiety disorder, CKD, COPD, Dyslipidemia, HTN, DARRON    Scheduled Medications:  atorvastatin, 80 mg, Daily  azithromycin, 250 mg, Daily  cefTRIAXone (ROCEPHIN) IVPB, 1 g, Q24H  cetirizine, 10 mg, Daily  heparin (porcine), 5,000 Units, Q12H  losartan, 50 mg, Daily  mupirocin, , BID  predniSONE, 40 mg, Daily    Continuous Infusions:   PRN Medications: acetaminophen, albuterol-ipratropium, benzonatate, dextromethorphan-guaiFENesin  mg/5 ml, hydrALAZINE, melatonin, polyethylene glycol, sodium chloride 0.9%    Recent Labs   Lab 01/02/24  1107 01/03/24  0453    141   K 4.7 4.9   CALCIUM 9.2 9.4   PHOS  --  2.5   MG  --  2.00   CHLORIDE 109* 110*   CO2 25 24   BUN 18.9 20.8*   CREATININE 1.31* 1.18*   EGFRNORACEVR 44 49   GLUCOSE 95 137*   BILITOT 0.7 0.6   ALKPHOS 142 119   ALT 26 23   AST 31 24   ALBUMIN 3.9 3.6   WBC 9.36 13.61*   HGB 14.2 13.8   HCT 44.1 42.0     Nutrition Orders:  Diet heart healthy      Appetite/Oral Intake: good/% of meals  Factors Affecting Nutritional Intake: none identified  Food/Quaker/Cultural Preferences: none reported  Food Allergies: no known food allergies  Last Bowel Movement: 01/02/24  Wound(s):  skin intact    Comments    1/3/24 -- Pt reports good appetite, denies difficulty eating; no indication of wt loss    Anthropometrics    Height: 5' 3" (160 cm),    Last Weight: 100 kg (220 lb 7.4 oz) (01/02/24 1545), " Weight Method: Standard Scale  BMI (Calculated): 39.1  BMI Classification: obese grade II (BMI 35-39.9)     Ideal Body Weight (IBW), Female: 115 lb     % Ideal Body Weight, Female (lb): 191.7 %                    Usual Body Weight (UBW), k kg  % Usual Body Weight: 100.21     Usual Weight Provided By: patient and EMR weight history    Wt Readings from Last 5 Encounters:   24 100 kg (220 lb 7.4 oz)   23 100.8 kg (222 lb 3.6 oz)   23 101.2 kg (223 lb)   23 101.4 kg (223 lb 9.6 oz)   23 98.9 kg (218 lb)     Weight Change(s) Since Admission:    Wt Readings from Last 1 Encounters:   24 1545 100 kg (220 lb 7.4 oz)   24 1051 100 kg (220 lb 7.4 oz)   Admit Weight: 100 kg (220 lb 7.4 oz) (24 1051), Weight Method: Standard Scale    Patient Education     Not applicable.    Nutrition Goals & Monitoring     Dietitian will monitor: food and beverage intake, weight change, and electrolyte/renal panel    Nutrition Risk/Follow-Up: low (follow-up in 5-7 days)  Patients assigned 'low nutrition risk' status do not qualify for a full nutritional assessment but will be monitored and re-evaluated in a 5-7 day time period. Please consult if re-evaluation needed sooner.

## 2024-01-03 NOTE — PROGRESS NOTES
[facility] Medicine  Progress Note      Patient Name: Marisel Resendez  : 1950  MRN: [id number]  Patient Class: IP- Inpatient   Admission Date: 2024   Length of Stay: 1  Admitting Service: Hospital Medicine  Attending Physician: Yon Mccauley MD  PCP: Teja Alejo FNP    CHIEF COMPLAINT   Hospital follow up    HOSPITAL COURSE     Brief HPI:  73 y.o. female with  has a past medical history of Anxiety disorder, unspecified, CKD (chronic kidney disease), COPD (chronic obstructive pulmonary disease), Dyslipidemia, History of radical nephrectomy, HTN (hypertension), and DARRON (obstructive sleep apnea). presents to ED for SOB for last 3 days. Dx with COVID on  felt improved after duoneb and desired to go home. She states since returning home, she has felt progressively worsening shortness of breath and returned. Denies lightheadedness, chest pain, productive cough, hemoptysis. Lives with  at home, not on home o2. Home regimen includes Trelegy and prn albuterol. Does not use CPAP at night.      In ED, patient placed in venti mask and received duoneb, azithromycin, rocephin. CXR concernin for consolidation. Troponin and BNP negative. EKG NSR without changes with Qtc 408.     Interval History:  NAEON. Blood pressure remains elevated. On ventimask overnight. Remains afebrile.     OBJECTIVE/PHYSICAL EXAM     VITAL SIGNS (MOST RECENT):  Temp: 97.8 °F (36.6 °C) (24 1138)  Pulse: 110 (24 1138)  Resp: 20 (24 0721)  BP: 90/65 (24 1138)  SpO2: (!) 91 % (24 1138) VITAL SIGNS (24 HOUR RANGE):  Temp:  [97.8 °F (36.6 °C)-98.4 °F (36.9 °C)]   Pulse:  []   Resp:  [20]   BP: ()/(65-94)   SpO2:  [90 %-96 %]      Physical Exam  Vitals and nursing note reviewed.   Constitutional:       General: She is not in acute distress.     Appearance: She is not diaphoretic.   HENT:      Head: Atraumatic.      Mouth/Throat:      Mouth: Mucous membranes  are moist.      Pharynx: Oropharynx is clear.   Eyes:      General: No scleral icterus.     Extraocular Movements: Extraocular movements intact.   Cardiovascular:      Rate and Rhythm: Normal rate and regular rhythm.      Heart sounds: No murmur heard.  Pulmonary:      Effort: No respiratory distress.      Breath sounds: Wheezing present.      Comments: Mild expiratory wheezing   Satting appropriately on NC  Abdominal:      General: Abdomen is flat. There is no distension.      Palpations: Abdomen is soft.      Tenderness: There is no abdominal tenderness.   Musculoskeletal:      Right lower leg: No edema.      Left lower leg: No edema.   Skin:     General: Skin is warm and dry.      Capillary Refill: Capillary refill takes less than 2 seconds.      Coloration: Skin is not jaundiced or pale.   Neurological:      Mental Status: She is alert and oriented to person, place, and time.      Comments: CNII-XII grossly intact    Psychiatric:         Behavior: Behavior normal.         LABS/MICRO/MEDS/DIAGNOSTICS     LABS  CBC  Recent Labs     01/02/24 1107 01/03/24  0453   WBC 9.36 13.61*   RBC 4.51 4.37   HGB 14.2 13.8   HCT 44.1 42.0   MCV 97.8* 96.1*   MCH 31.5* 31.6*   MCHC 32.2* 32.9*   RDW 12.4 12.3    244     Cardiac  Recent Labs     01/02/24  1107   BNP 43.9   TROPONINI <0.010     ABG/Lactate  Recent Labs     01/02/24  1138 01/02/24  1225   PH 7.370  --    PCO2 51.0*  --    PO2 32.0*  --    HCO3 29.5*  --    LACTIC  --  1.1       BMP  Recent Labs     01/02/24  1107 01/03/24  0453    141   K 4.7 4.9   CHLORIDE 109* 110*   CO2 25 24   BUN 18.9 20.8*   CREATININE 1.31* 1.18*   GLUCOSE 95 137*   CALCIUM 9.2 9.4   MG  --  2.00   PHOS  --  2.5     LFTs  Recent Labs     01/02/24  1107 01/03/24  0453   ALBUMIN 3.9 3.6   GLOBULIN 3.7* 3.4   ALKPHOS 142 119   ALT 26 23   AST 31 24   BILITOT 0.7 0.6     Diabetes  Recent Labs     01/02/24  1107 01/03/24  0453   GLUCOSE 95 137*        INTAKE/OUTPUT    Intake/Output  Summary (Last 24 hours) at 1/3/2024 1316  Last data filed at 1/2/2024 1446  Gross per 24 hour   Intake 353.7 ml   Output --   Net 353.7 ml        MICROBIOLOGY  Microbiology Results (last 7 days)       Procedure Component Value Units Date/Time    Respiratory Culture [[id number]]     Order Status: Sent Specimen: Sputum     Blood Culture #2 **CANNOT BE ORDERED STAT** [[id number]] Collected: 01/02/24 1225    Order Status: Resulted Specimen: Blood from Wrist, Left Updated: 01/02/24 1235    Blood Culture #1 **CANNOT BE ORDERED STAT** [[id number]] Collected: 01/02/24 1225    Order Status: Resulted Specimen: Blood from Antecubital, Left Updated: 01/02/24 1235             MEDICATIONS   atorvastatin  80 mg Oral Daily    azithromycin  250 mg Oral Daily    cefTRIAXone (ROCEPHIN) IVPB  1 g Intravenous Q24H    cetirizine  10 mg Oral Daily    heparin (porcine)  5,000 Units Subcutaneous Q12H    losartan  50 mg Oral Daily    methylPREDNISolone sodium succinate injection  60 mg Intravenous Once    mupirocin   Nasal BID    predniSONE  40 mg Oral Daily         INFUSIONS       DIAGNOSTIC TESTS  X-Ray Chest 1 View   Final Result      Confluent airspace opacities at the right base infiltrate cannot be excluded.      Atelectatic changes at the left base.      Prominence of the right hilum some of which might be related to slight rotation         Electronically signed by: Lane Duffy   Date:    01/02/2024   Time:    12:54           EF   Date Value Ref Range Status   02/28/2023 65 % Final     Nuc Stress EF   Date Value Ref Range Status   05/24/2023 89 % Final     Nuc Rest EF   Date Value Ref Range Status   05/24/2023 86  Final          ASSESSMENT/PLAN      COPD Exacerbation  PNA  COVID+   Likely secondary to COVID, dx 12/25   CXR reveal right lower lobe opacification  Abx: PO azithromycin 250mg, IV rocephin 1g daily, (day 2)  Will obtain respiratory panel  Blood culture pending  Duonebs Q4h  Received 125mg solumedrol in ED, continue  with Prednisone 40mg daily, monitor for leukocytosis  Maintain O2 between 88-92%  Tessalon pearls and Mucinex for cough  Covid precautions removed as patient out of isolation period   PT/OT    CKD  S/p Right Nephrectomy   S/p kidney donation   Cr 1.18, appears at baseline (baseline ~1.4)  CTM  Avoid nephrotoxic agents      HTN   Continue home medication losartan 50mg daily  Hx of LE edema with amlodipine, avoid beta blocker in setting of wheezing   Will continue PRNs     HLD   Crestor 40mg not on formulary,   will start Lipitor 80mg while hospitalized.       Access: pIV  Antibiotics: rocephin and azithro (started 01/02)  Diet: heart healthy  DVT Prophylaxis: heparin   Fluids: none    Anticipated discharge and disposition: discharge home pending improve oxygen requirements to baseline, 48hr blood culture negative  __________________________________________________________________________    Vaishnavi Salas MD  [facility], [facility]

## 2024-01-04 VITALS
WEIGHT: 220.44 LBS | SYSTOLIC BLOOD PRESSURE: 138 MMHG | HEIGHT: 63 IN | HEART RATE: 94 BPM | RESPIRATION RATE: 18 BRPM | DIASTOLIC BLOOD PRESSURE: 97 MMHG | OXYGEN SATURATION: 92 % | BODY MASS INDEX: 39.06 KG/M2 | TEMPERATURE: 99 F

## 2024-01-04 PROBLEM — U07.1 PNEUMONIA DUE TO COVID-19 VIRUS: Status: ACTIVE | Noted: 2024-01-04

## 2024-01-04 PROBLEM — J12.82 PNEUMONIA DUE TO COVID-19 VIRUS: Status: ACTIVE | Noted: 2024-01-04

## 2024-01-04 LAB
ALBUMIN SERPL-MCNC: 3.6 G/DL (ref 3.4–4.8)
ALBUMIN/GLOB SERPL: 1 RATIO (ref 1.1–2)
ALP SERPL-CCNC: 122 UNIT/L (ref 40–150)
ALT SERPL-CCNC: 21 UNIT/L (ref 0–55)
AST SERPL-CCNC: 23 UNIT/L (ref 5–34)
BASOPHILS # BLD AUTO: 0.02 X10(3)/MCL
BASOPHILS NFR BLD AUTO: 0.1 %
BILIRUB SERPL-MCNC: 0.4 MG/DL
BUN SERPL-MCNC: 28.9 MG/DL (ref 9.8–20.1)
CALCIUM SERPL-MCNC: 9.3 MG/DL (ref 8.4–10.2)
CHLORIDE SERPL-SCNC: 110 MMOL/L (ref 98–107)
CO2 SERPL-SCNC: 24 MMOL/L (ref 23–31)
CREAT SERPL-MCNC: 1.36 MG/DL (ref 0.55–1.02)
EOSINOPHIL # BLD AUTO: 0 X10(3)/MCL (ref 0–0.9)
EOSINOPHIL NFR BLD AUTO: 0 %
ERYTHROCYTE [DISTWIDTH] IN BLOOD BY AUTOMATED COUNT: 12.4 % (ref 11.5–17)
GFR SERPLBLD CREATININE-BSD FMLA CKD-EPI: 42 MLS/MIN/1.73/M2
GLOBULIN SER-MCNC: 3.5 GM/DL (ref 2.4–3.5)
GLUCOSE SERPL-MCNC: 147 MG/DL (ref 82–115)
HCT VFR BLD AUTO: 42.8 % (ref 37–47)
HGB BLD-MCNC: 13.6 G/DL (ref 12–16)
IMM GRANULOCYTES # BLD AUTO: 0.28 X10(3)/MCL (ref 0–0.04)
IMM GRANULOCYTES NFR BLD AUTO: 1.6 %
LYMPHOCYTES # BLD AUTO: 0.97 X10(3)/MCL (ref 0.6–4.6)
LYMPHOCYTES NFR BLD AUTO: 5.6 %
MAGNESIUM SERPL-MCNC: 2.2 MG/DL (ref 1.6–2.6)
MCH RBC QN AUTO: 31 PG (ref 27–31)
MCHC RBC AUTO-ENTMCNC: 31.8 G/DL (ref 33–36)
MCV RBC AUTO: 97.5 FL (ref 80–94)
MONOCYTES # BLD AUTO: 0.65 X10(3)/MCL (ref 0.1–1.3)
MONOCYTES NFR BLD AUTO: 3.8 %
NEUTROPHILS # BLD AUTO: 15.31 X10(3)/MCL (ref 2.1–9.2)
NEUTROPHILS NFR BLD AUTO: 88.9 %
NRBC BLD AUTO-RTO: 0 %
PHOSPHATE SERPL-MCNC: 3.4 MG/DL (ref 2.3–4.7)
PLATELET # BLD AUTO: 260 X10(3)/MCL (ref 130–400)
PMV BLD AUTO: 10.3 FL (ref 7.4–10.4)
POTASSIUM SERPL-SCNC: 5.1 MMOL/L (ref 3.5–5.1)
PROT SERPL-MCNC: 7.1 GM/DL (ref 5.8–7.6)
RBC # BLD AUTO: 4.39 X10(6)/MCL (ref 4.2–5.4)
SODIUM SERPL-SCNC: 143 MMOL/L (ref 136–145)
WBC # SPEC AUTO: 17.23 X10(3)/MCL (ref 4.5–11.5)

## 2024-01-04 PROCEDURE — 97165 OT EVAL LOW COMPLEX 30 MIN: CPT

## 2024-01-04 PROCEDURE — 94761 N-INVAS EAR/PLS OXIMETRY MLT: CPT

## 2024-01-04 PROCEDURE — 94640 AIRWAY INHALATION TREATMENT: CPT

## 2024-01-04 PROCEDURE — 85025 COMPLETE CBC W/AUTO DIFF WBC: CPT

## 2024-01-04 PROCEDURE — 97161 PT EVAL LOW COMPLEX 20 MIN: CPT

## 2024-01-04 PROCEDURE — 27000221 HC OXYGEN, UP TO 24 HOURS

## 2024-01-04 PROCEDURE — 99900035 HC TECH TIME PER 15 MIN (STAT)

## 2024-01-04 PROCEDURE — 25000242 PHARM REV CODE 250 ALT 637 W/ HCPCS

## 2024-01-04 PROCEDURE — 84100 ASSAY OF PHOSPHORUS: CPT

## 2024-01-04 PROCEDURE — 25000003 PHARM REV CODE 250

## 2024-01-04 PROCEDURE — 80053 COMPREHEN METABOLIC PANEL: CPT

## 2024-01-04 PROCEDURE — 83735 ASSAY OF MAGNESIUM: CPT

## 2024-01-04 PROCEDURE — 63600175 PHARM REV CODE 636 W HCPCS

## 2024-01-04 PROCEDURE — 63700000 PHARM REV CODE 250 ALT 637 W/O HCPCS

## 2024-01-04 RX ORDER — AZITHROMYCIN 250 MG/1
250 TABLET, FILM COATED ORAL DAILY
Qty: 2 TABLET | Refills: 0 | Status: SHIPPED | OUTPATIENT
Start: 2024-01-05 | End: 2024-01-07

## 2024-01-04 RX ORDER — PREDNISONE 20 MG/1
40 TABLET ORAL DAILY
Qty: 10 TABLET | Refills: 0 | Status: SHIPPED | OUTPATIENT
Start: 2024-01-05 | End: 2024-01-10

## 2024-01-04 RX ADMIN — LOSARTAN POTASSIUM 50 MG: 25 TABLET, FILM COATED ORAL at 09:01

## 2024-01-04 RX ADMIN — IPRATROPIUM BROMIDE AND ALBUTEROL SULFATE 3 ML: 2.5; .5 SOLUTION RESPIRATORY (INHALATION) at 07:01

## 2024-01-04 RX ADMIN — MUPIROCIN: 20 OINTMENT TOPICAL at 09:01

## 2024-01-04 RX ADMIN — ATORVASTATIN CALCIUM 80 MG: 40 TABLET, FILM COATED ORAL at 09:01

## 2024-01-04 RX ADMIN — AZITHROMYCIN MONOHYDRATE 250 MG: 250 TABLET ORAL at 09:01

## 2024-01-04 RX ADMIN — PREDNISONE 40 MG: 20 TABLET ORAL at 09:01

## 2024-01-04 RX ADMIN — HEPARIN SODIUM 5000 UNITS: 5000 INJECTION, SOLUTION INTRAVENOUS; SUBCUTANEOUS at 09:01

## 2024-01-04 NOTE — DISCHARGE SUMMARY
[facility] Discharge Summary    Admitting Physician: Yon Mccauley MD  Attending Physician: Yon Mccauley MD  Date of Admit: 1/2/2024  Date of Discharge: 1/4/2024    Condition: Good  Outcome: Condition has improved and patient is now ready for discharge.  DISPOSITION: Home or Self Care    Discharge Diagnoses     Principal Problem:  Pneumonia due to COVID-19 virus  Active Hospital Problems    Diagnosis  POA    *Pneumonia due to COVID-19 virus [U07.1, J12.82]  Yes      Resolved Hospital Problems   No resolved problems to display.       Patient Active Problem List   Diagnosis    Asthma    Chronic obstructive pulmonary disease    Hyperlipidemia    Hypertension    Impaired glucose tolerance    Obesity    Obstructive sleep apnea syndrome    Osteoarthritis of left knee    Psoriasis    Stage 3 chronic kidney disease    Vitamin D deficiency    Wellness examination    Gastroesophageal reflux disease without esophagitis    Dyspnea on exertion    Leg fatigue    Bilateral leg edema    Atypical chest pain    Prediabetes    Pneumonia due to COVID-19 virus       Consultants and Procedures     Consultants:  IP CONSULT TO INTERNAL MEDICINE    Procedures:   * No surgery found *     Brief Admission History      73 y.o. female with  has a past medical history of Anxiety disorder, unspecified, CKD (chronic kidney disease), COPD (chronic obstructive pulmonary disease), Dyslipidemia, History of radical nephrectomy, HTN (hypertension), and DARRON (obstructive sleep apnea). presents to ED for SOB for last 3 days. Dx with COVID on 12/25 felt improved after duoneb and desired to go home. She states since returning home, she has felt progressively worsening shortness of breath and returned. Denies lightheadedness, chest pain, productive cough, hemoptysis. Lives with  at home. Home regimen includes Trelegy, prn albuterol, prn home oxygen. Does not use CPAP at night.      Hospital Course with Pertinent Findings     In  "ED, patient placed in venti mask and received duoneb, azithromycin, rocephin, steroids. CXR concernin for consolidation. She tested positive for covid on 12/25 and likely covid pna. She received 3 days of abx with azithromycin and rocephin and duonebs plus steroids throughout stay. Her condition improved and oxygen requirement returned to baseline.  Vitals remained stable, although hypertension likely secondary to steroids. Lab work including CBC and CMP remained stable and at baseline. Hospital course uncomplicated and she was stable for discharge with remaining doses of azithromycin and prednisone. ED precautions discussed.     Discharge physical exam:  Vitals  BP: (!) 144/91  Temp: 97.9 °F (36.6 °C)  Temp Source: Oral  Pulse: 74  Resp: 18  SpO2: 96 %  Height: 5' 3" (160 cm)  Weight: 100 kg (220 lb 7.4 oz)    Physical Exam  Vitals and nursing note reviewed.   Constitutional:       General: She is not in acute distress.     Appearance: She is not diaphoretic.   HENT:      Head: Atraumatic.      Mouth/Throat:      Mouth: Mucous membranes are moist.      Pharynx: Oropharynx is clear.   Eyes:      General: No scleral icterus.     Extraocular Movements: Extraocular movements intact.   Cardiovascular:      Rate and Rhythm: Normal rate and regular rhythm.      Heart sounds: No murmur heard.  Pulmonary:      Effort: No respiratory distress.      Breath sounds: Wheezing present.      Comments: Mild expiratory wheezing, improved from yesterday   Satting appropriately on room air   Abdominal:      General: Abdomen is flat. There is no distension.      Palpations: Abdomen is soft.      Tenderness: There is no abdominal tenderness.   Musculoskeletal:      Right lower leg: No edema.      Left lower leg: No edema.   Skin:     General: Skin is warm and dry.      Capillary Refill: Capillary refill takes less than 2 seconds.      Coloration: Skin is not jaundiced or pale.   Neurological:      Mental Status: She is alert and " oriented to person, place, and time.      Comments: CNII-XII grossly intact    Psychiatric:         Behavior: Behavior normal.       TIME SPENT ON DISCHARGE: 60 minutes    Discharge Medications        Medication List        START taking these medications      azithromycin 250 MG tablet  Commonly known as: Z-GRISELDA  Take 1 tablet (250 mg total) by mouth once daily. for 2 days  Start taking on: January 5, 2024     predniSONE 20 MG tablet  Commonly known as: DELTASONE  Take 2 tablets (40 mg total) by mouth once daily. for 5 days  Start taking on: January 5, 2024            CONTINUE taking these medications      albuterol 90 mcg/actuation inhaler  Commonly known as: PROVENTIL/VENTOLIN HFA  Inhale 1 puff into the lungs every 4 (four) hours as needed for Wheezing or Shortness of Breath.     albuterol-ipratropium 2.5 mg-0.5 mg/3 mL nebulizer solution  Commonly known as: DUO-NEB  Take 3 mLs by nebulization 4 (four) times daily as needed for Shortness of Breath or Wheezing.     benzonatate 100 MG capsule  Commonly known as: TESSALON  Take 1 capsule (100 mg total) by mouth 3 (three) times daily as needed for Cough.     calcium carbonate 500 mg calcium (1,250 mg) tablet  Commonly known as: OS-GINGER     cetirizine 10 MG tablet  Commonly known as: ZYRTEC     diclofenac sodium 1 % Gel  Commonly known as: VOLTAREN     fluticasone-umeclidin-vilanter 100-62.5-25 mcg Dsdv  Commonly known as: TRELEGY ELLIPTA  Inhale 1 puff into the lungs once daily.     furosemide 20 MG tablet  Commonly known as: LASIX  Take 1 tablet (20 mg total) by mouth daily as needed (swelling).     losartan 50 MG tablet  Commonly known as: COZAAR  Take 1 tablet (50 mg total) by mouth once daily.     omeprazole 20 MG capsule  Commonly known as: PRILOSEC  Take 1 capsule (20 mg total) by mouth once daily.     rosuvastatin 40 MG Tab  Commonly known as: CRESTOR  Take 1 tablet (40 mg total) by mouth every evening.               Where to Get Your Medications        These  medications were sent to [place], [place] - [address] [place] [address].  [address] [facility] [address]      Phone: [phone]   azithromycin 250 MG tablet  predniSONE 20 MG tablet         Discharge Information:     Complete all medications as prescribed.     ED precautions discussed including but not limited to worsening SOB, chest pain, syncope, dizziness.      Maintain the following appointments:   Follow-up Information       [facility] Follow up.    Specialty: Family Medicine  Why: Someone will contact you to schedule appointment  Contact information:  [address] [place] [address] [phone]             Teja Alejo FNP Follow up in 3 day(s).    Specialty: Family Medicine  Contact information:  [address] [place] [address] [phone]               [facility] Emergency Dept Follow up.    Specialty: Emergency Medicine  Why: If symptoms worsen  Contact information:  [address] [place] [id number] [phone]                       Vaishnavi Salas MD  [facility], [facility]

## 2024-01-04 NOTE — PT/OT/SLP EVAL
Physical Therapy Evaluation & Discharge Note    Patient Name:  Marisel Resendez   MRN:  [id number]    Recommendations     Therapy Intensity Recommendations at Discharge: No Therapy Indicated  Discharge Equipment Recommendations: none   Equipment to be obtained for discharge: none.  Barriers to discharge: none    Assessment     Marisel Resendez is a 73 y.o. female admitted with a medical diagnosis of Pneumonia due to COVID-19 virus.        Patient was seen by therapy for evaluation only.  Patient's current level of function is at baseline (PLOF).  Patient does not require further acute PT services.     Recent Surgery: * No surgery found *      Plan     Patient discharged from acute PT Services on 01/04/24.    Based on current functional levels observed, patient does not require further acute PT services.    Re-consult PT Services if patient's status changes and therapy services warranted.    Subjective     Communicated with patient's nurse  prior to session.    Patient agreeable to participate in evaluation.     Chief Complaint: none  Patient/Family Comments/goals: to go home  Pain/Comfort:  Pain Rating 1: 0/10  Pain Rating Post-Intervention 1: 0/10    Patients cultural, spiritual, Judaism conflicts given the current situation: no    Social History  Living Environment: Patient lives with their spouse in a single level home, with no steps.  Functional Level: Prior to admission patient ambulated without assistive device and was independent in ADL's.  Equipment Used at Home: none  Equipment owned (not currently used): none.  Assistance Upon Discharge: none needed.    Objective     Patient found sitting edge of bed with peripheral IV, oxygen  upon PT entry to room.    General Precautions: Standard, droplet, airborne, contact   Orthopedic Precautions:N/A   Braces: N/A  Respiratory Status: 1 liters/min O2 via nasal cannula    Vitals   At Rest (pre-session)  BP  /   HR     O2 Sat %  93      With Activity (post-session)  BP   /   HR     O2 Sat %  91     Exams:  Orientation: Patient is oriented to person, place, time, situation  Commands: Patient follows commands consistently  BILAT UE ROM/strength - defer to OT - see OT note for details  RLE ROM: WNL  RLE Strength: WNL  LLE ROM: WNL  LLE Strength: WNL    Functional Mobility:    Bed Mobility:  Seated EOB and returned EOB    Transfers:  Sit to Stand: modified independence with no assistive device    Gait:  Patient ambulated 65ft with no assistive device and independence.  Patient demonstrates :       steady gait       symmetrical step length       upright posture.    Other Mobility:  not assessed    Balance:  Sit  Static: NORMAL: No deviations seen in posture held statically  Dynamic: NORMAL: No deviations seen in posture held dynamically  Stand  Static: NORMAL: No deviations seen in posture held statically  Dynamic: NORMAL: No deviations seen in posture held dynamically    Additional Treatment Session  n/a    Patient left sitting edge of bed with all lines intact and call button in reach.    Education     White board updated regarding patient's safest level of mobility with staff assistance.    Goals - No STG's or LTG's established secondary to patient was seen for evaluation and discharge     Multidisciplinary Problems       Physical Therapy Goals       Not on file                    History     Past Medical History:   Diagnosis Date    Anxiety disorder, unspecified     CKD (chronic kidney disease)     COPD (chronic obstructive pulmonary disease)     Dyslipidemia     History of radical nephrectomy     HTN (hypertension)     DARRON (obstructive sleep apnea)      Past Surgical History:   Procedure Laterality Date    bilateral oophrectomy  1990    CATARACT EXTRACTION W/ INTRAOCULAR LENS IMPLANT Right 08/05/2021    CATARACT EXTRACTION W/ INTRAOCULAR LENS IMPLANT Left 07/13/2021    HYSTERECTOMY  1997    LAPAROSCOPIC CHOLECYSTECTOMY N/A 8/4/2023    Procedure: CHOLECYSTECTOMY, LAPAROSCOPIC;   Surgeon: Anton Marie Jr., MD;  Location: [facility];  Service: General;  Laterality: N/A;    ROBOT-ASSISTED LAPAROSCOPIC DONOR NEPHRECTOMY Right 2002     Time Tracking     PT Received On: 01/04/24  PT Start Time: 1112     PT Stop Time: 1128  PT Total Time (min): 16 min     Billable Minutes: Evaluation , low complexity    01/04/2024

## 2024-01-04 NOTE — PT/OT/SLP EVAL
Occupational Therapy   Evaluation and Discharge Note    Name: Marisel Resendez  MRN: [id number]  Admitting Diagnosis: Pneumonia due to COVID-19 virus  Recent Surgery: * No surgery found *      Recommendations:     Discharge Recommendations: No Therapy Indicated  Discharge Equipment Recommendations: none  Barriers to discharge:       Assessment:     Marisel Resendez is a 73 y.o. female with a medical diagnosis of Pneumonia due to COVID-19 virus. At this time, patient is functioning at their prior level of function and does not require further acute OT services.     Plan:     During this hospitalization, patient does not require further acute OT services.  Please re-consult if situation changes.    Plan of Care Reviewed with: patient    Subjective     Chief Complaint:   No c/o   Patient/Family Comments/goals: go back home    Occupational Profile:  Living Environment: resides with spouse, no steps to enter, tub/shower combo with shower chair  Previous level of function: I with ADL's and IADL's, ambulatory without AD  Roles and Routines:   Equipment Used at home: shower chair  Assistance upon Discharge: spouse    Pain/Comfort:  Pain Rating 1: 0/10    Patients cultural, spiritual, Yazdanism conflicts given the current situation:      Objective:     Communicated with: nurse prior to session.  Patient found sitting edge of bed with oxygen upon OT entry to room.    General Precautions: Standard,  (Droplet - COVID)  Orthopedic Precautions:    Braces:    Respiratory Status: Nasal cannula, flow 1 L/min   O2 sat 92-93%, desat to 88% without ambulation without supplemental O2, with seated restbreak able to recover to 92%    Occupational Performance:    Bed Mobility:     I    Functional Mobility/Transfers:  Patient completed Sit <> Stand Transfer with independence  with  no assistive device   Patient completed Toilet Transfer Step Transfer technique with independence with  no AD  Functional Mobility: able to ambulate in room I  without AD, ambulated 65 ft in room without AD (confined to room due to isolation pxns in place)    Activities of Daily Living:  Grooming: independence (not observed, pt reports I with standing at sink)  Upper Body Dressing: independence (per pt report)  Lower Body Dressing: independence (per pt report)  Toileting: independence (per pt report)   No deficits observed to impede I with ADL tasks    Cognitive/Visual Perceptual:  Cognitive/Psychosocial Skills:     -       Oriented to: Person, Place, Time, and Situation   -       Follows Commands/attention:Follows multistep  commands  -       Safety awareness/insight to disability: intact   -       Mood/Affect/Coping skills/emotional control: Cooperative and Pleasant    Physical Exam:  Balance: -       dynamic standing balance good no LOB  Upper Extremity Range of Motion:     -       Right Upper Extremity: WNL  -       Left Upper Extremity: WNL  Upper Extremity Strength: -       Right Upper Extremity: WNL  -       Left Upper Extremity: WNL    AMPAC 6 Click ADL:  AMPAC Total Score:      Treatment & Education:  Education on increasing OOB ax and ambulating multiple rounds in room while in hospital setting to increase endurance and prevent hospital debility ; dc from OT services    Patient left sitting edge of bed with all lines intact and call button in reach    GOALS:   Multidisciplinary Problems       Occupational Therapy Goals       Not on file                    History:     Past Medical History:   Diagnosis Date    Anxiety disorder, unspecified     CKD (chronic kidney disease)     COPD (chronic obstructive pulmonary disease)     Dyslipidemia     History of radical nephrectomy     HTN (hypertension)     DARRON (obstructive sleep apnea)          Past Surgical History:   Procedure Laterality Date    bilateral oophrectomy  1990    CATARACT EXTRACTION W/ INTRAOCULAR LENS IMPLANT Right 08/05/2021    CATARACT EXTRACTION W/ INTRAOCULAR LENS IMPLANT Left 07/13/2021    HYSTERECTOMY   1997    LAPAROSCOPIC CHOLECYSTECTOMY N/A 8/4/2023    Procedure: CHOLECYSTECTOMY, LAPAROSCOPIC;  Surgeon: Anton Marie Jr., MD;  Location: [facility];  Service: General;  Laterality: N/A;    ROBOT-ASSISTED LAPAROSCOPIC DONOR NEPHRECTOMY Right 2002       Time Tracking:     OT Date of Treatment:    OT Start Time: 1114  OT Stop Time: 1128  OT Total Time (min): 14 min    Billable Minutes:Evaluation low comp    1/4/2024

## 2024-01-07 ENCOUNTER — PATIENT MESSAGE (OUTPATIENT)
Dept: ADMINISTRATIVE | Facility: OTHER | Age: 74
End: 2024-01-07

## 2024-01-07 LAB
BACTERIA BLD CULT: NORMAL
BACTERIA BLD CULT: NORMAL

## 2024-01-08 PROBLEM — E66.01 SEVERE OBESITY (BMI 35.0-39.9) WITH COMORBIDITY: Status: ACTIVE | Noted: 2022-07-22

## 2024-01-08 PROBLEM — N18.32 STAGE 3B CHRONIC KIDNEY DISEASE: Status: ACTIVE | Noted: 2022-07-22

## 2024-01-08 NOTE — PROGRESS NOTES
Family Medicine    Patient ID: [id number]   274}  Chief Complaint: Hospital Follow Up (Pt was admitted to hospital for covid and pneumonia, pt states she is improving but still dealing with fatigue and coughing. Pt states she is using oxygen more frequently.)         HPI:     Marisel Resendez is a 73 y.o. female here today for a hospital follow up. She was admitted on 24 for Covid 19 PNA. She had an uneventful hospital course and improved with supportive care, antibiotics and steroids. She was discharged on 2024 on azithromycin and prednisone. She has completed these medications.   Today, feels better but not fully back to normal. Still with fatigue and little bit of shortness of breath.   Uses oxygen at night and occasionally during the day if needed.   She states she is doing well.     274}  Past Medical History:   Diagnosis Date    Anxiety disorder, unspecified     CKD (chronic kidney disease)     COPD (chronic obstructive pulmonary disease)     Dyslipidemia     History of radical nephrectomy     HTN (hypertension)     DARRON (obstructive sleep apnea)          Past Surgical History:   Procedure Laterality Date    bilateral oophrectomy      CATARACT EXTRACTION W/ INTRAOCULAR LENS IMPLANT Right 2021    CATARACT EXTRACTION W/ INTRAOCULAR LENS IMPLANT Left 2021    HYSTERECTOMY      LAPAROSCOPIC CHOLECYSTECTOMY N/A 2023    Procedure: CHOLECYSTECTOMY, LAPAROSCOPIC;  Surgeon: Anton Marie Jr., MD;  Location: [facility] ER;  Service: General;  Laterality: N/A;    ROBOT-ASSISTED LAPAROSCOPIC DONOR NEPHRECTOMY Right         Social History     Tobacco Use    Smoking status: Former     Current packs/day: 0.00     Types: Cigarettes     Quit date: 2017     Years since quittin.0     Passive exposure: Current    Smokeless tobacco: Never   Substance and Sexual Activity    Alcohol use: Never    Drug use: Never    Sexual activity: Not Currently     Partners: Male     Birth  "control/protection: Abstinence        Current Outpatient Medications   Medication Instructions    albuterol (PROVENTIL/VENTOLIN HFA) 90 mcg/actuation inhaler 1 puff, Inhalation, Every 4 hours PRN    albuterol-ipratropium (DUO-NEB) 2.5 mg-0.5 mg/3 mL nebulizer solution 3 mLs, Nebulization, 4 times daily PRN    benzonatate (TESSALON) 100 mg, Oral, 3 times daily PRN    calcium carbonate (OS-GINGER) 500 mg calcium (1,250 mg) tablet 1 tablet, Oral, 2 times daily    cetirizine (ZYRTEC) 10 mg, Oral, Daily    diclofenac sodium (VOLTAREN) 1 % Gel Topical, 4 times daily PRN    fluticasone-umeclidin-vilanter (TRELEGY ELLIPTA) 100-62.5-25 mcg DsDv 1 puff, Inhalation, Daily    furosemide (LASIX) 20 mg, Oral, Daily PRN    losartan (COZAAR) 50 mg, Oral, Daily    omeprazole (PRILOSEC) 20 mg, Oral, Daily    predniSONE (DELTASONE) 40 mg, Oral, Daily    rosuvastatin (CRESTOR) 40 mg, Oral, Nightly     274}  Review of patient's allergies indicates:  No Known Allergies    Patient Care Team:  Teja Alejo FNP as PCP - General (Family Medicine)  Isabel Riojas FNP as Nurse Practitioner (Nephrology)     Subjective:     Review of Systems    12 point review of systems conducted, negative except as stated in the history of present illness. See HPI for details.    Objective:   274}  Visit Vitals  /84 (BP Location: Left arm, Patient Position: Sitting, BP Method: Large (Automatic))   Pulse 81   Temp 97.4 °F (36.3 °C) (Oral)   Resp 20   Ht 5' 3" (1.6 m)   Wt 99.8 kg (220 lb)   SpO2 95%   BMI 38.97 kg/m²         Physical Exam  Constitutional:       General: She is not in acute distress.     Appearance: Normal appearance. She is normal weight.   HENT:      Head: Normocephalic and atraumatic.      Right Ear: Tympanic membrane, ear canal and external ear normal.      Left Ear: Tympanic membrane, ear canal and external ear normal.      Nose: Nose normal.      Mouth/Throat:      Mouth: Mucous membranes are moist.      Pharynx: Oropharynx is " clear.   Eyes:      Extraocular Movements: Extraocular movements intact.      Conjunctiva/sclera: Conjunctivae normal.      Pupils: Pupils are equal, round, and reactive to light.   Neck:      Vascular: No carotid bruit.   Cardiovascular:      Rate and Rhythm: Normal rate and regular rhythm.      Pulses: Normal pulses.      Heart sounds: Normal heart sounds. No murmur heard.     No gallop.   Pulmonary:      Effort: Pulmonary effort is normal.      Breath sounds: Normal breath sounds. No wheezing, rhonchi or rales.   Abdominal:      Tenderness: There is no abdominal tenderness.   Musculoskeletal:         General: No swelling. Normal range of motion.      Cervical back: Neck supple. No tenderness.   Lymphadenopathy:      Cervical: No cervical adenopathy.   Skin:     General: Skin is warm and dry.      Capillary Refill: Capillary refill takes less than 2 seconds.      Findings: No erythema or rash.   Neurological:      General: No focal deficit present.      Mental Status: She is alert and oriented to person, place, and time.      Motor: No weakness.      Deep Tendon Reflexes: Reflexes normal.   Psychiatric:         Mood and Affect: Mood normal.         Behavior: Behavior normal.         Thought Content: Thought content normal.         Judgment: Judgment normal.         Labs Reviewed:    274}  Chemistry:  Lab Results   Component Value Date     01/04/2024    K 5.1 01/04/2024    CHLORIDE 110 (H) 01/04/2024    BUN 28.9 (H) 01/04/2024    CREATININE 1.36 (H) 01/04/2024    EGFRNORACEVR 42 01/04/2024    GLUCOSE 147 (H) 01/04/2024    CALCIUM 9.3 01/04/2024    ALKPHOS 122 01/04/2024    LABPROT 7.1 01/04/2024    ALBUMIN 3.6 01/04/2024    BILIDIR 0.2 01/11/2019    IBILI 0.3 01/11/2019    AST 23 01/04/2024    ALT 21 01/04/2024    MG 2.20 01/04/2024    PHOS 3.4 01/04/2024    PBURVARK31TV 40.08 11/08/2018    TSH 2.212 11/27/2023        Lab Results   Component Value Date    HGBA1C 6.0 11/27/2023        Hematology:  Lab Results    Component Value Date    WBC 17.23 (H) 01/04/2024    HGB 13.6 01/04/2024    HCT 42.8 01/04/2024     01/04/2024       Lipid Panel:  Lab Results   Component Value Date    CHOL 133 11/27/2023    HDL 51 11/27/2023    LDL 58.00 11/27/2023    TRIG 119 11/27/2023    TOTALCHOLEST 3 11/27/2023        Urine:  Lab Results   Component Value Date    COLORUA Light-Yellow 07/24/2023    APPEARANCEUA Clear 07/24/2023    SGUA 1.018 07/24/2023    PHUA 6.0 07/24/2023    PROTEINUA Negative 07/24/2023    GLUCOSEUA Normal 07/24/2023    KETONESUA Negative 07/24/2023    BLOODUA Negative 07/24/2023    NITRITESUA Negative 07/24/2023    LEUKOCYTESUR Negative 07/24/2023    RBCUA 0-5 07/24/2023    WBCUA 0-5 07/24/2023    BACTERIA None Seen 07/24/2023    SQEPUA Occ (A) 07/24/2023    HYALINECASTS None Seen 07/24/2023    CREATRANDUR 126.4 (H) 07/24/2023    PROTEINURINE 12.3 07/24/2023    UPROTCREA 0.1 07/24/2023        Assessment:    274}    ICD-10-CM ICD-9-CM   1. Chronic obstructive pulmonary disease, unspecified COPD type  J44.9 496   2. Hospital discharge follow-up  Z09 V67.59   3. Severe obesity (BMI 35.0-39.9) with comorbidity  E66.01 278.01   4. Stage 3b chronic kidney disease  N18.32 585.3   5. Primary hypertension  I10 401.9        Plan:     1. Chronic obstructive pulmonary disease, unspecified COPD type  Overview:  PFTs 2/28/23: Nonspecific decrease in flow rates with no measured obstruction or restriction    Assessment & Plan:   Continue current medication.   Continue Devin PRN    Orders:  -     benzonatate (TESSALON) 100 MG capsule; Take 1 capsule (100 mg total) by mouth 3 (three) times daily as needed for Cough.  Dispense: 30 capsule; Refill: 1    2. Hospital discharge follow-up  Comments:  Improving. Continue using oxygen as needed. RTC if symptoms worsen or change.    3. Severe obesity (BMI 35.0-39.9) with comorbidity  Assessment & Plan:  Encourage weight loss and exercise as tolerated at this time.      4. Stage 3b chronic  kidney disease  Assessment & Plan:  Stable from November.   Avoid NSAIDs (i.e., Advil, Aleve, Ibuprofen, Motrin, Naproxen, Diclofenac, Indocin, Celebrex, Mobic, Voltaren). Avoid the following GI meds: Milk of Mag, Mylanta, Fleets Enema, and Pepto-Bismol. Okay to take Tylenol (acetaminophen) (if no end-stage liver disease), low dose ASA (81 mg), Miralax, Tums, and Colace. Increase clear fluid intake. Avoid dark sodas.      5. Primary hypertension  Overview:  Losartan 50 mg po daily           Follow up if symptoms worsen or fail to improve, for Keep your next scheduled appt. . In addition to their scheduled follow up, the patient has also been instructed to follow up on as needed basis.     Future Appointments   Date Time Provider Department Center   1/31/2024  9:30 AM Isabel Riojas FNP [facility] NEPHR [place]    4/2/2024  7:15 AM Teja Alejo FNP [facility] INTMED [place]    12/23/2024  9:00 AM PROVIDERS, NATASHA Cosby MD

## 2024-01-09 ENCOUNTER — PATIENT OUTREACH (OUTPATIENT)
Dept: ADMINISTRATIVE | Facility: CLINIC | Age: 74
End: 2024-01-09

## 2024-01-09 NOTE — PROGRESS NOTES
C3 nurse attempted to contact Marisel Resendez  for a TCC post hospital discharge follow up call. No answer. Left voicemail with callback information. The patient has a scheduled HOSFU appointment with BIRGIT Stafford on 1/10/24 @ 10:20 AM

## 2024-01-09 NOTE — PHYSICIAN QUERY
PT Name: Marisel Resendez  MR #: [id number]     DOCUMENTATION CLARIFICATION     CDS/: Cait TONEY          Contact information:  [email or web address]  This form is a permanent document in the medical record.    Query Date: January 9, 2024    By submitting this query, we are merely seeking further clarification of documentation.  Please utilize your independent clinical judgment when addressing the question(s) below.  The Medical Record contains the following:   Indicators   Supporting Clinical Findings Location in Medical Record   x Pneumonia documented  Pneumonia due to COVID-19 virus     CXR concernin for consolidation. She tested positive for covid on 12/25 and likely covid pna. She received 3 days of abx with azithromycin and rocephin and duonebs plus steroids throughout stay. Her condition improved and oxygen requirement returned to baseline.   Hospital course uncomplicated and she was stable for discharge with remaining doses of azithromycin and prednisone.    Discharge summary [facility]/ DANIELE Mccauley MD     x Chest X-Ray/CT Scan CXR reveal right lower lobe opacification    Discharge summary [facility] DIAZ/ DANIELE Mccauley MD   X                   PaO2    PaCO2          Component 01/02/24 1138   Sample Type Arterial Blood   Sample site Right Radial Artery   pH, Blood gas 7.370   pCO2, Blood gas 51.0 High Panic    pO2, Blood gas 32.0 Low Panic    TOC2, Blood gas 31.1 High    Base Excess, Blood gas 3.10 High        Blood gases     x WBC WBC   1/2   9.36  1/3   13.61  1/4   17.23   Lab     x Vital Signs          Temp   HR   RR   BP             Sat  1/2    97.5     89    22    180/107   93-90  1/3    98        93    20    151/80     91-93  1/4    98.5     94    18    138/97     92   Vitals      Cultures      x Treatment  Received azithromycin 500mg and rocephin 1g in ED  Continue with PO azithromycin 250mg, IV rocephin 1g daily     Duonebs Q4h   Received 125mg solumedrol in ED, continue with Prednisone 40mg  daily   Maintain O2 between 88-92%  Tessalon pearls prn for cough   Discharge summary [facility] DIAZ/ DANIELE Mccauley MD   x Supplemental O2 1/2-1/3 12 L       40 %  1/3-1/4 3 L NC   32% Vitals      Dysphagia/Swallow study      Other       Provider, please clarify the Pneumonia diagnosis related to the above clinical indicators:    [X] Bacterial, Pneumonia (please further specify organism being treated): Pseudomonas______     [   ] COVID-19 Pneumonia     [   ] Unspecified Pneumonia     [   ] Other type of pneumonia (please specify): ________     [   ] Other respiratory diagnosis (please specify): _________     [  ] Clinically undetermined     Please document in your progress notes daily for the duration of treatment, until resolved, and include in your discharge summary.     Form No. [id number]

## 2024-01-10 ENCOUNTER — OFFICE VISIT (OUTPATIENT)
Dept: INTERNAL MEDICINE | Facility: CLINIC | Age: 74
End: 2024-01-10

## 2024-01-10 ENCOUNTER — PATIENT MESSAGE (OUTPATIENT)
Dept: ADMINISTRATIVE | Facility: OTHER | Age: 74
End: 2024-01-10

## 2024-01-10 VITALS
TEMPERATURE: 97 F | RESPIRATION RATE: 20 BRPM | HEART RATE: 81 BPM | SYSTOLIC BLOOD PRESSURE: 136 MMHG | WEIGHT: 220 LBS | HEIGHT: 63 IN | BODY MASS INDEX: 38.98 KG/M2 | OXYGEN SATURATION: 95 % | DIASTOLIC BLOOD PRESSURE: 84 MMHG

## 2024-01-10 DIAGNOSIS — N18.32 STAGE 3B CHRONIC KIDNEY DISEASE: ICD-10-CM

## 2024-01-10 DIAGNOSIS — Z09 HOSPITAL DISCHARGE FOLLOW-UP: ICD-10-CM

## 2024-01-10 DIAGNOSIS — I10 PRIMARY HYPERTENSION: ICD-10-CM

## 2024-01-10 DIAGNOSIS — J44.9 CHRONIC OBSTRUCTIVE PULMONARY DISEASE, UNSPECIFIED COPD TYPE: Primary | ICD-10-CM

## 2024-01-10 DIAGNOSIS — E66.01 SEVERE OBESITY (BMI 35.0-39.9) WITH COMORBIDITY: ICD-10-CM

## 2024-01-10 PROCEDURE — 99214 OFFICE O/P EST MOD 30 MIN: CPT | Mod: PBBFAC | Performed by: FAMILY MEDICINE

## 2024-01-10 PROCEDURE — 99214 OFFICE O/P EST MOD 30 MIN: CPT | Mod: S$PBB,,, | Performed by: FAMILY MEDICINE

## 2024-01-10 RX ORDER — BENZONATATE 100 MG/1
100 CAPSULE ORAL 3 TIMES DAILY PRN
Qty: 30 CAPSULE | Refills: 1 | Status: SHIPPED | OUTPATIENT
Start: 2024-01-10

## 2024-01-10 NOTE — PHYSICIAN QUERY
PT Name: Marisel Resendez  MR #: [id number]     Documentation Clarification      CDS/: Cait Gutierrez RN CDI           Contact information: [email or web address]    This form is a permanent document in the medical record.     Query Date: January 10, 2024    By submitting this query, we are merely seeking further clarification of documentation. Please utilize your independent clinical judgment when addressing the question(s) below.    The Medical Record reflects the following:    Supporting Clinical Findings Location in Medical Record   Bacterial Pneumonia following covid infection, unknown bacterial organism  Query answer 1/4 S Josue DIAZ   1/2 Covid Not detected    COPD Exacerbation  PNA  COVID+   Likely secondary to COVID, dx 12/25   CXR reveal right lower lobe opacification  Received azithromycin 500mg and rocephin 1g in ED  Continue with PO azithromycin 250mg, IV rocephin 1g daily Lab     H&P [facility] DIAZ                                                                            Provider, please specify the relationship of Bacterial Pneumonia and Post Covid infection.  [ X ] Bacterial Pneumonia is a post COVID condition.     [   ] Bacterial Pneumonia is Not a post COVID condition.     [   ] Other (please specify): ____________     [  ] Clinically undetermined

## 2024-01-10 NOTE — PROGRESS NOTES
C3 nurse made second attempt to contact Marisel Resendez for a TCC post hospital discharge follow up call.

## 2024-01-10 NOTE — ASSESSMENT & PLAN NOTE
Stable from November.   Avoid NSAIDs (i.e., Advil, Aleve, Ibuprofen, Motrin, Naproxen, Diclofenac, Indocin, Celebrex, Mobic, Voltaren). Avoid the following GI meds: Milk of Mag, Mylanta, Fleets Enema, and Pepto-Bismol. Okay to take Tylenol (acetaminophen) (if no end-stage liver disease), low dose ASA (81 mg), Miralax, Tums, and Colace. Increase clear fluid intake. Avoid dark sodas.

## 2024-01-26 ENCOUNTER — TELEPHONE (OUTPATIENT)
Dept: NEPHROLOGY | Facility: CLINIC | Age: 74
End: 2024-01-26

## 2024-01-29 ENCOUNTER — LAB VISIT (OUTPATIENT)
Dept: LAB | Facility: HOSPITAL | Age: 74
End: 2024-01-29
Attending: NURSE PRACTITIONER

## 2024-01-29 DIAGNOSIS — N18.32 CKD STAGE G3B/A1, GFR 30-44 AND ALBUMIN CREATININE RATIO <30 MG/G: ICD-10-CM

## 2024-01-29 LAB
ALBUMIN SERPL-MCNC: 3.6 G/DL (ref 3.4–4.8)
ALBUMIN/GLOB SERPL: 1.2 RATIO (ref 1.1–2)
ALP SERPL-CCNC: 118 UNIT/L (ref 40–150)
ALT SERPL-CCNC: 25 UNIT/L (ref 0–55)
AST SERPL-CCNC: 23 UNIT/L (ref 5–34)
BILIRUB SERPL-MCNC: 0.6 MG/DL
BUN SERPL-MCNC: 20.9 MG/DL (ref 9.8–20.1)
CALCIUM SERPL-MCNC: 8.8 MG/DL (ref 8.4–10.2)
CHLORIDE SERPL-SCNC: 110 MMOL/L (ref 98–107)
CO2 SERPL-SCNC: 25 MMOL/L (ref 23–31)
CREAT SERPL-MCNC: 1.32 MG/DL (ref 0.55–1.02)
GFR SERPLBLD CREATININE-BSD FMLA CKD-EPI: 43 MLS/MIN/1.73/M2
GLOBULIN SER-MCNC: 2.9 GM/DL (ref 2.4–3.5)
GLUCOSE SERPL-MCNC: 96 MG/DL (ref 82–115)
POTASSIUM SERPL-SCNC: 4.5 MMOL/L (ref 3.5–5.1)
PROT SERPL-MCNC: 6.5 GM/DL (ref 5.8–7.6)
SODIUM SERPL-SCNC: 143 MMOL/L (ref 136–145)

## 2024-01-29 PROCEDURE — 36415 COLL VENOUS BLD VENIPUNCTURE: CPT

## 2024-01-29 PROCEDURE — 80053 COMPREHEN METABOLIC PANEL: CPT

## 2024-01-31 ENCOUNTER — OFFICE VISIT (OUTPATIENT)
Dept: NEPHROLOGY | Facility: CLINIC | Age: 74
End: 2024-01-31

## 2024-01-31 VITALS
WEIGHT: 223.81 LBS | BODY MASS INDEX: 39.66 KG/M2 | HEIGHT: 63 IN | RESPIRATION RATE: 18 BRPM | SYSTOLIC BLOOD PRESSURE: 138 MMHG | OXYGEN SATURATION: 95 % | DIASTOLIC BLOOD PRESSURE: 92 MMHG | TEMPERATURE: 98 F | HEART RATE: 91 BPM

## 2024-01-31 DIAGNOSIS — I10 PRIMARY HYPERTENSION: ICD-10-CM

## 2024-01-31 DIAGNOSIS — N18.32 CKD STAGE G3B/A1, GFR 30-44 AND ALBUMIN CREATININE RATIO <30 MG/G: Primary | ICD-10-CM

## 2024-01-31 DIAGNOSIS — E66.01 SEVERE OBESITY (BMI 35.0-35.9 WITH COMORBIDITY): ICD-10-CM

## 2024-01-31 PROCEDURE — 99214 OFFICE O/P EST MOD 30 MIN: CPT | Mod: PBBFAC | Performed by: NURSE PRACTITIONER

## 2024-01-31 PROCEDURE — 99214 OFFICE O/P EST MOD 30 MIN: CPT | Mod: S$PBB,,, | Performed by: NURSE PRACTITIONER

## 2024-01-31 RX ORDER — LOSARTAN POTASSIUM 100 MG/1
100 TABLET ORAL DAILY
Qty: 90 TABLET | Refills: 3 | Status: SHIPPED | OUTPATIENT
Start: 2024-01-31 | End: 2025-01-30

## 2024-01-31 NOTE — PROGRESS NOTES
"[facility]  Nephrology Clinic Note    Chief complaint: Follow-up (RTC, took meds, tired, edema)    History of present illness:   Marisel Resendez is a 73 y.o. White female with past medical history of chronic kidney disease stage III, solitary kidney (she is a kidney donor, status post radical nephrectomy in 2003), hypertension, dyslipidemia, anxiety, COPD, and DARRON (not on NIPPV). She presents for follow-up appointment in nephrology clinic.      Review of Systems  12 point review of systems conducted, negative except as stated in the history of present illness.    Allergies: Patient has No Known Allergies.     Past Medical History:  has a past medical history of Anxiety disorder, unspecified, CKD (chronic kidney disease), COPD (chronic obstructive pulmonary disease), Dyslipidemia, History of radical nephrectomy, HTN (hypertension), and DARRON (obstructive sleep apnea).    Procedure History:  has a past surgical history that includes Cataract extraction w/ intraocular lens implant (Right, 08/05/2021); Cataract extraction w/ intraocular lens implant (Left, 07/13/2021); Hysterectomy (1997); bilateral oophrectomy (1990); Robot-assisted laparoscopic donor nephrectomy (Right, 2002); and Laparoscopic cholecystectomy (N/A, 8/4/2023).    Family History: family history includes Cancer in her mother; Heart attack in her father; Heart failure in her father.    Social History:  reports that she quit smoking about 7 years ago. Her smoking use included cigarettes. She has been exposed to tobacco smoke. She has never used smokeless tobacco. She reports that she does not drink alcohol and does not use drugs.    Physical exam  BP (!) 138/92 (BP Location: Right arm, Patient Position: Sitting, BP Method: Large (Manual))   Pulse 91   Temp 97.9 °F (36.6 °C) (Oral)   Resp 18   Ht 5' 2.99" (1.6 m)   Wt 101.5 kg (223 lb 12.8 oz)   SpO2 95%   BMI 39.65 kg/m²   General appearance: Patient is in no acute " distress.  Skin: No rashes or wounds.  HEENT: PERRLA, EOMI, no scleral icterus, no JVD. Neck is supple.  Chest: Respirations are unlabored. Lungs sounds are clear.   Heart: S1, S2.   Abdomen: Benign.  : Deferred.  Extremities: No edema, peripheral pulses are palpable.   Neuro: No focal deficits.     Home Medications:    Current Outpatient Medications:     albuterol (PROVENTIL/VENTOLIN HFA) 90 mcg/actuation inhaler, Inhale 1 puff into the lungs every 4 (four) hours as needed for Wheezing or Shortness of Breath., Disp: 6.7 g, Rfl: 6    albuterol-ipratropium (DUO-NEB) 2.5 mg-0.5 mg/3 mL nebulizer solution, Take 3 mLs by nebulization 4 (four) times daily as needed for Shortness of Breath or Wheezing., Disp: 75 mL, Rfl: 6    benzonatate (TESSALON) 100 MG capsule, Take 1 capsule (100 mg total) by mouth 3 (three) times daily as needed for Cough., Disp: 30 capsule, Rfl: 1    calcium carbonate (OS-GINGER) 500 mg calcium (1,250 mg) tablet, Take 1 tablet by mouth 2 (two) times daily., Disp: , Rfl:     cetirizine (ZYRTEC) 10 MG tablet, Take 10 mg by mouth Daily., Disp: , Rfl:     diclofenac sodium (VOLTAREN) 1 % Gel, Apply topically 4 (four) times daily as needed., Disp: , Rfl:     fluticasone-umeclidin-vilanter (TRELEGY ELLIPTA) 100-62.5-25 mcg DsDv, Inhale 1 puff into the lungs once daily., Disp: 60 each, Rfl: 6    furosemide (LASIX) 20 MG tablet, Take 1 tablet (20 mg total) by mouth daily as needed (swelling)., Disp: 30 tablet, Rfl: 1    omeprazole (PRILOSEC) 20 MG capsule, Take 1 capsule (20 mg total) by mouth once daily., Disp: 90 capsule, Rfl: 1    rosuvastatin (CRESTOR) 40 MG Tab, Take 1 tablet (40 mg total) by mouth every evening., Disp: 90 tablet, Rfl: 1    losartan (COZAAR) 100 MG tablet, Take 1 tablet (100 mg total) by mouth once daily., Disp: 90 tablet, Rfl: 3  No current facility-administered medications for this visit.    Facility-Administered Medications Ordered in Other Visits:     diphenhydrAMINE injection 25  mg, 25 mg, Intravenous, Once PRN, Malathi Live MD    HYDROmorphone injection 0.2 mg, 0.2 mg, Intravenous, Q5 Min PRN, Malathi Live MD    HYDROmorphone injection 0.5 mg, 0.5 mg, Intravenous, Q5 Min PRN, Malathi Live MD, 0.5 mg at 08/04/23 0914    lactated ringers infusion, , Intravenous, Continuous, Malathi Live MD, Last Rate: 10 mL/hr at 08/04/23 0617, New Bag at 08/04/23 0617    LIDOcaine (PF) 10 mg/ml (1%) injection 10 mg, 1 mL, Intradermal, Once, Emma Hansen FNP    ondansetron injection 4 mg, 4 mg, Intravenous, Once, Malathi Live MD    prochlorperazine injection Soln 5 mg, 5 mg, Intravenous, Once PRN, Malathi Live MD    Laboratory data    Lab Results   Component Value Date    WBC 17.23 (H) 01/04/2024    HGB 13.6 01/04/2024    HCT 42.8 01/04/2024     01/04/2024    IRON 84 12/01/2017    TIBC 343 12/01/2017    TRANS 274.0 12/01/2017    LABIRON 24.5 12/01/2017    FERRITIN 68.3 12/01/2017     01/29/2024    K 4.5 01/29/2024    CHLORIDE 110 (H) 01/29/2024    CO2 25 01/29/2024    BUN 20.9 (H) 01/29/2024    CREATININE 1.32 (H) 01/29/2024    EGFRNORACEVR 43 01/29/2024    GLUCOSE 96 01/29/2024    CALCIUM 8.8 01/29/2024    ALKPHOS 118 01/29/2024    LABPROT 6.5 01/29/2024    ALBUMIN 3.6 01/29/2024    BILIDIR 0.2 01/11/2019    IBILI 0.3 01/11/2019    AST 23 01/29/2024    ALT 25 01/29/2024    MG 2.20 01/04/2024    PHOS 3.4 01/04/2024      Lab Results   Component Value Date    HGBA1C 6.0 11/27/2023    PTH 54.7 11/08/2018    [id number] 40.08 11/08/2018     Urine:  Lab Results   Component Value Date    COLORUA Light-Yellow 07/24/2023    APPEARANCEUA Clear 07/24/2023    SGUA 1.018 07/24/2023    PHUA 6.0 07/24/2023    PROTEINUA Negative 07/24/2023    GLUCOSEUA Normal 07/24/2023    KETONESUA Negative 07/24/2023    BLOODUA Negative 07/24/2023    NITRITESUA Negative 07/24/2023    LEUKOCYTESUR Negative 07/24/2023    RBCUA 0-5 07/24/2023    WBCUA 0-5 07/24/2023     BACTERIA None Seen 07/24/2023    SQEPUA Occ (A) 07/24/2023    HYALINECASTS None Seen 07/24/2023    CREATRANDUR 126.4 (H) 07/24/2023    PROTEINURINE 12.3 07/24/2023    UPROTCREA 0.1 07/24/2023         Imaging  (06/10/2015 08:58 CDT US Retroperitoneum Limited)  Clinical indication: Patient donated right kidney, solitary kidney.  Imaging of the retroperitoneal areas were obtained, with the right  kidney noted be surgically absent with no abnormality seen in the  right renal fossa. The left kidney is normal in appearance measuring  11.5 x 6.0 x 5.2 cm. There is no hydronephrosis, calculi, or evidence  of a solid mass. No perirenal fluid collections are demonstrated.  Renal cortex demonstrates normal echogenicity. The bladder was not  imaged.  Impression: Status post right nephrectomy with normal appearance the  left kidney.     Impression    ICD-10-CM ICD-9-CM   1. CKD stage G3b/A1, GFR 30-44 and albumin creatinine ratio <30 mg/g  N18.32 585.3   2. Primary hypertension  I10 401.9   3. Severe obesity (BMI 35.0-35.9 with comorbidity)  E66.01 278.01    Z68.35 V85.35        Plan     CKD stage G3b/A1, GFR 30-44 and albumin creatinine ratio <30 mg/g  Solitary kidney.  Renal function has been is stable, there is no significant proteinuria.  Continue risk factor management and periodic monitoring.  Continue:  -follow 2 g a day dietary sodium restriction  -control high blood pressure (goal blood pressure is less than 130/80, please check blood pressure twice a week and bring blood pressure logs to office visit)  -exercise at least 30 minutes a day, 5 days a week  -maintain healthy weight  -stay well hydrated (drink water only, avoid juices, sweet tea, and sodas)  -ask about staying up-to-date on vaccinations (flu vaccine, pneumonia vaccine, hepatitis B vaccine)  -avoid excessive use of NSAIDs (ibuprofen, naproxen, Aleve, Advil, Toradol, Mobic), take Tylenol as needed for headache or mild pain  -take cholesterol lowering  medications if prescribed (LDL goal less than 100)  Follow up in about 6 months (around 7/31/2024).    Primary hypertension  BP is above goal. Will increase Losartan to 100 mg/day.    Severe obesity (BMI 35.0-35.9 with comorbidity)  Lifestyle and dietary interventions discussed, patient encouraged to maintain non-sedentary lifestyle and well-balanced diet.     Orders Placed This Encounter   Procedures    CBC Auto Differential     Standing Status:   Future     Standing Expiration Date:   8/15/2024    Protein/Creatinine Ratio, Urine     Standing Status:   Future     Standing Expiration Date:   8/15/2024     Order Specific Question:   Specimen Source     Answer:   Urine    Comprehensive Metabolic Panel     Standing Status:   Future     Standing Expiration Date:   8/15/2024    Urinalysis, Reflex to Urine Culture     Standing Status:   Future     Standing Expiration Date:   8/15/2024     Order Specific Question:   Specimen Source     Answer:   Urine        1/31/2024  Isabel Riojas NP  [facility] Nephrology

## 2024-02-21 ENCOUNTER — PATIENT MESSAGE (OUTPATIENT)
Dept: INTERNAL MEDICINE | Facility: CLINIC | Age: 74
End: 2024-02-21

## 2024-03-04 PROBLEM — Z00.00 WELLNESS EXAMINATION: Status: RESOLVED | Noted: 2022-07-22 | Resolved: 2024-03-04

## 2024-03-25 DIAGNOSIS — J44.9 CHRONIC OBSTRUCTIVE PULMONARY DISEASE, UNSPECIFIED COPD TYPE: ICD-10-CM

## 2024-04-08 PROBLEM — U07.1 PNEUMONIA DUE TO COVID-19 VIRUS: Status: RESOLVED | Noted: 2024-01-04 | Resolved: 2024-04-08

## 2024-04-08 PROBLEM — J12.82 PNEUMONIA DUE TO COVID-19 VIRUS: Status: RESOLVED | Noted: 2024-01-04 | Resolved: 2024-04-08

## 2024-04-17 ENCOUNTER — PATIENT MESSAGE (OUTPATIENT)
Dept: INTERNAL MEDICINE | Facility: CLINIC | Age: 74
End: 2024-04-17

## 2024-06-18 ENCOUNTER — OFFICE VISIT (OUTPATIENT)
Dept: INTERNAL MEDICINE | Facility: CLINIC | Age: 74
End: 2024-06-18

## 2024-06-18 VITALS
RESPIRATION RATE: 20 BRPM | HEART RATE: 87 BPM | HEIGHT: 63 IN | TEMPERATURE: 98 F | BODY MASS INDEX: 39.02 KG/M2 | DIASTOLIC BLOOD PRESSURE: 72 MMHG | SYSTOLIC BLOOD PRESSURE: 97 MMHG | WEIGHT: 220.19 LBS

## 2024-06-18 DIAGNOSIS — K21.9 GASTROESOPHAGEAL REFLUX DISEASE WITHOUT ESOPHAGITIS: ICD-10-CM

## 2024-06-18 DIAGNOSIS — N18.32 STAGE 3B CHRONIC KIDNEY DISEASE: ICD-10-CM

## 2024-06-18 DIAGNOSIS — I10 PRIMARY HYPERTENSION: Primary | ICD-10-CM

## 2024-06-18 DIAGNOSIS — L20.9 ATOPIC DERMATITIS, UNSPECIFIED TYPE: ICD-10-CM

## 2024-06-18 DIAGNOSIS — R60.0 BILATERAL LEG EDEMA: ICD-10-CM

## 2024-06-18 DIAGNOSIS — Z12.12 SCREENING FOR COLORECTAL CANCER: ICD-10-CM

## 2024-06-18 DIAGNOSIS — R73.03 PREDIABETES: ICD-10-CM

## 2024-06-18 DIAGNOSIS — E78.2 MIXED HYPERLIPIDEMIA: ICD-10-CM

## 2024-06-18 DIAGNOSIS — J44.9 CHRONIC OBSTRUCTIVE PULMONARY DISEASE, UNSPECIFIED COPD TYPE: ICD-10-CM

## 2024-06-18 DIAGNOSIS — Z12.11 SCREENING FOR COLORECTAL CANCER: ICD-10-CM

## 2024-06-18 PROCEDURE — 99214 OFFICE O/P EST MOD 30 MIN: CPT | Mod: S$PBB,,, | Performed by: NURSE PRACTITIONER

## 2024-06-18 PROCEDURE — 99214 OFFICE O/P EST MOD 30 MIN: CPT | Mod: PBBFAC | Performed by: NURSE PRACTITIONER

## 2024-06-18 RX ORDER — TRIAMCINOLONE ACETONIDE 1 MG/G
CREAM TOPICAL 2 TIMES DAILY PRN
Qty: 80 G | Refills: 0 | Status: SHIPPED | OUTPATIENT
Start: 2024-06-18

## 2024-06-18 RX ORDER — BENZONATATE 100 MG/1
100 CAPSULE ORAL 3 TIMES DAILY PRN
Qty: 30 CAPSULE | Refills: 1 | Status: SHIPPED | OUTPATIENT
Start: 2024-06-18

## 2024-06-18 RX ORDER — OMEPRAZOLE 20 MG/1
20 CAPSULE, DELAYED RELEASE ORAL DAILY
Qty: 90 CAPSULE | Refills: 1 | Status: SHIPPED | OUTPATIENT
Start: 2024-06-18 | End: 2024-12-15

## 2024-06-18 RX ORDER — ROSUVASTATIN CALCIUM 40 MG/1
40 TABLET, COATED ORAL NIGHTLY
Qty: 90 TABLET | Refills: 1 | Status: SHIPPED | OUTPATIENT
Start: 2024-06-18 | End: 2024-12-15

## 2024-06-18 RX ORDER — FUROSEMIDE 20 MG/1
20 TABLET ORAL DAILY PRN
Qty: 90 TABLET | Refills: 1 | Status: SHIPPED | OUTPATIENT
Start: 2024-06-18 | End: 2024-12-15

## 2024-06-18 NOTE — PROGRESS NOTES
[facility]  Ayo Duran DNP     Patient ID: [id number]     Chief Complaint: No chief complaint on file.      HPI:     Marisel Resendez is a 73 y.o. female here today for follow up with PCP. States feeling well, would like refills for occasional cough with COPD. Denies SOB, CP, fever, or other.     Health Maintenance         Date Due Completion Date    Annual UACr Never done ---    Colorectal Cancer Screening 2024 5/3/2023    Hemoglobin A1c (Prediabetes) 2024    Mammogram 2024    DEXA Scan 2025    TETANUS VACCINE 2027    Lipid Panel 2028            Past Medical History:   Diagnosis Date    Anxiety disorder, unspecified     CKD (chronic kidney disease)     COPD (chronic obstructive pulmonary disease)     Dyslipidemia     History of radical nephrectomy     HTN (hypertension)     DARRON (obstructive sleep apnea)         Past Surgical History:   Procedure Laterality Date    bilateral oophrectomy      CATARACT EXTRACTION W/ INTRAOCULAR LENS IMPLANT Right 2021    CATARACT EXTRACTION W/ INTRAOCULAR LENS IMPLANT Left 2021    HYSTERECTOMY      LAPAROSCOPIC CHOLECYSTECTOMY N/A 2023    Procedure: CHOLECYSTECTOMY, LAPAROSCOPIC;  Surgeon: Anton Marie Jr., MD;  Location: [facility];  Service: General;  Laterality: N/A;    ROBOT-ASSISTED LAPAROSCOPIC DONOR NEPHRECTOMY Right         Social History     Tobacco Use    Smoking status: Former     Current packs/day: 0.00     Types: Cigarettes     Quit date: 2017     Years since quittin.4     Passive exposure: Current    Smokeless tobacco: Never   Substance and Sexual Activity    Alcohol use: Never    Drug use: Never    Sexual activity: Not Currently     Partners: Male     Birth control/protection: Abstinence        Current Outpatient Medications   Medication Instructions    albuterol (PROVENTIL/VENTOLIN HFA) 90 mcg/actuation inhaler 1 puff,  Inhalation, Every 4 hours PRN    albuterol-ipratropium (DUO-NEB) 2.5 mg-0.5 mg/3 mL nebulizer solution 3 mLs, Nebulization, 4 times daily PRN    benzonatate (TESSALON) 100 mg, Oral, 3 times daily PRN    calcium carbonate (OS-GINGER) 500 mg calcium (1,250 mg) tablet 1 tablet, Oral, 2 times daily    cetirizine (ZYRTEC) 10 mg, Oral, Daily    diclofenac sodium (VOLTAREN) 1 % Gel Topical, 4 times daily PRN    fluticasone-umeclidin-vilanter (TRELEGY ELLIPTA) 100-62.5-25 mcg DsDv 1 puff, Inhalation, Daily    furosemide (LASIX) 20 mg, Oral, Daily PRN    losartan (COZAAR) 100 mg, Oral, Daily    omeprazole (PRILOSEC) 20 mg, Oral, Daily    rosuvastatin (CRESTOR) 40 mg, Oral, Nightly    triamcinolone acetonide 0.1% (KENALOG) 0.1 % cream Topical (Top), 2 times daily PRN       Review of patient's allergies indicates:  No Known Allergies     Patient Care Team:  Ayo Duran FNP as PCP - General (Family Medicine)  Isabel Riojas FNP as Nurse Practitioner (Nephrology)     Subjective:     Review of Systems   Constitutional:  Negative for appetite change, chills, diaphoresis and fever.   HENT:  Negative for ear pain, sinus pain and sore throat.    Eyes:  Negative for pain and visual disturbance.   Respiratory:  Negative for cough, shortness of breath and wheezing.    Cardiovascular:  Negative for chest pain, palpitations and leg swelling.   Gastrointestinal:  Negative for abdominal pain, blood in stool, diarrhea, nausea and vomiting.   Endocrine: Negative for cold intolerance.   Genitourinary:  Negative for difficulty urinating, dysuria, frequency and hematuria.   Musculoskeletal:  Negative for arthralgias, joint swelling and myalgias.   Skin:  Negative for color change and rash.   Allergic/Immunologic: Negative.    Neurological: Negative.  Negative for dizziness, syncope, light-headedness and numbness.   Hematological: Negative.    Psychiatric/Behavioral: Negative.  Negative for dysphoric mood and suicidal ideas. The patient is  "not nervous/anxious.    All other systems reviewed and are negative.      12 point review of systems conducted, negative except as stated in the history of present illness. See HPI for details.    Objective:     Visit Vitals  BP 97/72 (BP Location: Right arm, Patient Position: Sitting, BP Method: Large (Automatic))   Pulse 87   Temp 97.8 °F (36.6 °C) (Oral)   Resp 20   Ht 5' 2.99" (1.6 m)   Wt 99.9 kg (220 lb 3.2 oz)   BMI 39.02 kg/m²       Physical Exam  Vitals and nursing note reviewed.   Constitutional:       General: She is not in acute distress.     Appearance: She is not ill-appearing.   HENT:      Head: Normocephalic and atraumatic.      Mouth/Throat:      Mouth: Mucous membranes are moist.      Pharynx: Oropharynx is clear.   Eyes:      General: No scleral icterus.     Extraocular Movements: Extraocular movements intact.      Conjunctiva/sclera: Conjunctivae normal.      Pupils: Pupils are equal, round, and reactive to light.   Neck:      Vascular: No carotid bruit.   Cardiovascular:      Rate and Rhythm: Normal rate and regular rhythm.      Heart sounds: No murmur heard.     No friction rub. No gallop.   Pulmonary:      Effort: Pulmonary effort is normal. No respiratory distress.      Breath sounds: Normal breath sounds. No wheezing, rhonchi or rales.   Abdominal:      General: Abdomen is flat. Bowel sounds are normal. There is no distension.      Palpations: Abdomen is soft. There is no mass.      Tenderness: There is no abdominal tenderness.   Musculoskeletal:         General: Normal range of motion.      Cervical back: Normal range of motion and neck supple.   Skin:     General: Skin is warm and dry.   Neurological:      General: No focal deficit present.      Mental Status: She is alert.   Psychiatric:         Mood and Affect: Mood normal.         Labs Reviewed:     Chemistry:  Lab Results   Component Value Date     01/29/2024    K 4.5 01/29/2024    BUN 20.9 (H) 01/29/2024    CREATININE 1.32 (H) " 01/29/2024    EGFRNORACEVR 43 01/29/2024    GLUCOSE 96 01/29/2024    CALCIUM 8.8 01/29/2024    ALKPHOS 118 01/29/2024    LABPROT 6.5 01/29/2024    ALBUMIN 3.6 01/29/2024    BILIDIR 0.2 01/11/2019    IBILI 0.3 01/11/2019    AST 23 01/29/2024    ALT 25 01/29/2024    MG 2.20 01/04/2024    PHOS 3.4 01/04/2024    [id number] 40.08 11/08/2018    TSH 2.212 11/27/2023        Lab Results   Component Value Date    HGBA1C 6.0 11/27/2023        Hematology:  Lab Results   Component Value Date    WBC 17.23 (H) 01/04/2024    HGB 13.6 01/04/2024    HCT 42.8 01/04/2024     01/04/2024       Lipid Panel:  Lab Results   Component Value Date    CHOL 133 11/27/2023    HDL 51 11/27/2023    LDL 58.00 11/27/2023    TRIG 119 11/27/2023    TOTALCHOLEST 3 11/27/2023        Urine:  Lab Results   Component Value Date    APPEARANCEUA Clear 01/31/2024    SGUA 1.006 01/31/2024    PROTEINUA Negative 01/31/2024    KETONESUA Negative 01/31/2024    LEUKOCYTESUR Negative 01/31/2024    RBCUA 0-5 01/31/2024    WBCUA 0-5 01/31/2024    BACTERIA None Seen 01/31/2024    SQEPUA Occ (A) 01/31/2024    HYALINECASTS 0-2 (A) 01/31/2024    CREATRANDUR 24.1 (L) 01/31/2024    PROTEINURINE <6.8 01/31/2024    UPROTCREA 0.1 07/24/2023        Assessment:       ICD-10-CM ICD-9-CM   1. Primary hypertension  I10 401.9   2. Bilateral leg edema  R60.0 782.3   3. Mixed hyperlipidemia  E78.2 272.2   4. Chronic obstructive pulmonary disease, unspecified COPD type  J44.9 496   5. Stage 3b chronic kidney disease  N18.32 585.3   6. Prediabetes  R73.03 790.29   7. Gastroesophageal reflux disease without esophagitis  K21.9 530.81   8. Screening for colorectal cancer  Z12.11 V76.51    Z12.12 V76.41   9. Atopic dermatitis, unspecified type  L20.9 691.8        Plan:     1. Primary hypertension  Overview:  Losartan 50 mg po daily    Assessment & Plan:  Goal BP < 140/90, best goal is BP <130/80 consistently   Reduce the amount of salt in your diet, follow a 2 gm sodium, DASH diet  daily            Lose weight if you are overweight or have obesity  Avoid drinking too much alcohol  Stop smoking  Exercise at least 30 minutes per day most days of the week  Blood pressure is on the low side, she is asymptomatic however she is taking furosemide 20 mg daily which relieves her bilateral ankle edema.  This could be driving her blood pressure down some, recommend decrease losartan to 50 mg daily if blood pressure is running less than 110/50      Orders:  -     Comprehensive Metabolic Panel; Future; Expected date: 12/18/2024  -     TSH; Future; Expected date: 12/18/2024  -     Urinalysis; Future; Expected date: 12/18/2024  -     T4, Free; Future; Expected date: 12/18/2024  -     Microalbumin/Creatinine Ratio, Urine; Future; Expected date: 12/18/2024    2. Bilateral leg edema  -     furosemide (LASIX) 20 MG tablet; Take 1 tablet (20 mg total) by mouth daily as needed (swelling).  Dispense: 90 tablet; Refill: 1    3. Mixed hyperlipidemia  Assessment & Plan:  Counseled for low-sodium, low carb, low-cholesterol, good fat, Dash diet.  Recommend increasing fiber in the diet to lower LDL, increasing fish oil and fish such as salmon may help increase HDL good cholesterol.  Increasing aerobic activity as tolerated may also help lower LDL.  Healthy weight maintenance is advised, portion control, calorie counting, and discussed difference between complex carbs and simple carbs.    Continue current statin therapy.      Orders:  -     rosuvastatin (CRESTOR) 40 MG Tab; Take 1 tablet (40 mg total) by mouth every evening.  Dispense: 90 tablet; Refill: 1  -     Lipid Panel; Future; Expected date: 12/18/2024    4. Chronic obstructive pulmonary disease, unspecified COPD type  Overview:  PFTs 2/28/23: Nonspecific decrease in flow rates with no measured obstruction or restriction      Assessment & Plan:  Managed with Trelegy once daily, albuterol inhaler as needed, she does have home O2 that she uses at night as needed,  last hospitalization noted January 20, 2024 and she has been feeling well and stable.  She does take Tessalon as needed for increased coughing.    Orders:  -     benzonatate (TESSALON) 100 MG capsule; Take 1 capsule (100 mg total) by mouth 3 (three) times daily as needed for Cough.  Dispense: 30 capsule; Refill: 1    5. Stage 3b chronic kidney disease  Assessment & Plan:  Stable, chronic.  Avoid NSAIDs, avoid nephrotoxic agents, continue follow-up with Nephrology.  Continue to hydrate well daily.  Reviewed trending labs.  Discussed renal diet advice.  Discussed prevention of worsening renal indices and how to maintain healthy kidney function. Maintain optimal blood pressure control.        6. Prediabetes  Assessment & Plan:  Lab Results   Component Value Date    HGBA1C 6.0 11/27/2023    HGBA1C 5.5 07/21/2022    LDL 58.00 11/27/2023    CREATININE 1.32 (H) 01/29/2024       Discussed importance of diabetes prevention through lifestyle and dietary modifications.    Advised of ADA, low carb, low-fat, low-cholesterol diet, and importance of portion control.  Advised of increasing aerobic activity as tolerated and healthy weight maintenance.  Avoid soda, simple sweets, and limit rice/pasta/breads/starches (no more than 45-50 grams per meal).  Maintain healthy weight with goal BMI <30.  Exercise 5 times per week for 30 minutes per day.  Discussed importance of lifestyle and dietary modifications to prevent diabetes.          Orders:  -     Hemoglobin A1C; Future; Expected date: 12/18/2024    7. Gastroesophageal reflux disease without esophagitis  Assessment & Plan:  Counseled to avoid trigger foods.  Healthy weight maintenance advised.  Avoid lying down for at least 30 minutes after eating.  Best eat small frequent meals.  Practice low carb, low-fat, low-cholesterol diet.  May continue PPI therapy, precautions advised.  Smoking cessation advised (if a smoker).      Orders:  -     omeprazole (PRILOSEC) 20 MG capsule; Take 1  capsule (20 mg total) by mouth once daily.  Dispense: 90 capsule; Refill: 1    8. Screening for colorectal cancer  Overview:  Health Maintenance:   Colon Ca Screening-FIT 7/28/21, negative; 5/3/23, negative   Breast Ca Screening-Mammo 11/7/2022, negative; 11/27/23, negative   Lung Ca Screening-declines   Bone Density 11/7/2022, normal BMD      Orders:  -     Occult Blood, Stool 1st Specimen; Future; Expected date: 12/18/2024    9. Atopic dermatitis, unspecified type  -     triamcinolone acetonide 0.1% (KENALOG) 0.1 % cream; Apply topically 2 (two) times daily as needed (rash/itching).  Dispense: 80 g; Refill: 0         Follow up in about 6 months (around 12/18/2024) for with lab work prior to visit, follow up. In addition to their scheduled follow up, the patient has also been instructed to follow up on as needed basis.     Future Appointments   Date Time Provider Department Center   7/31/2024  9:00 AM Isabel Riojas FNP [facility] NEPHR [place]    12/19/2024  8:40 AM Ayo Duran FNP [facility] [place]    12/23/2024  9:00 AM [facility], [facility]

## 2024-06-18 NOTE — ASSESSMENT & PLAN NOTE
Stable, chronic.  Avoid NSAIDs, avoid nephrotoxic agents, continue follow-up with Nephrology.  Continue to hydrate well daily.  Reviewed trending labs.  Discussed renal diet advice.  Discussed prevention of worsening renal indices and how to maintain healthy kidney function. Maintain optimal blood pressure control.

## 2024-06-18 NOTE — ASSESSMENT & PLAN NOTE
Goal BP < 140/90, best goal is BP <130/80 consistently   Reduce the amount of salt in your diet, follow a 2 gm sodium, DASH diet daily            Lose weight if you are overweight or have obesity  Avoid drinking too much alcohol  Stop smoking  Exercise at least 30 minutes per day most days of the week  Blood pressure is on the low side, she is asymptomatic however she is taking furosemide 20 mg daily which relieves her bilateral ankle edema.  This could be driving her blood pressure down some, recommend decrease losartan to 50 mg daily if blood pressure is running less than 110/50

## 2024-06-18 NOTE — ASSESSMENT & PLAN NOTE
Lab Results   Component Value Date    HGBA1C 6.0 11/27/2023    HGBA1C 5.5 07/21/2022    LDL 58.00 11/27/2023    CREATININE 1.32 (H) 01/29/2024       Discussed importance of diabetes prevention through lifestyle and dietary modifications.    Advised of ADA, low carb, low-fat, low-cholesterol diet, and importance of portion control.  Advised of increasing aerobic activity as tolerated and healthy weight maintenance.  Avoid soda, simple sweets, and limit rice/pasta/breads/starches (no more than 45-50 grams per meal).  Maintain healthy weight with goal BMI <30.  Exercise 5 times per week for 30 minutes per day.  Discussed importance of lifestyle and dietary modifications to prevent diabetes.

## 2024-06-18 NOTE — ASSESSMENT & PLAN NOTE
Managed with Trelegy once daily, albuterol inhaler as needed, she does have home O2 that she uses at night as needed, last hospitalization noted January 20, 2024 and she has been feeling well and stable.  She does take Tessalon as needed for increased coughing.

## 2024-07-15 ENCOUNTER — PATIENT MESSAGE (OUTPATIENT)
Dept: ADMINISTRATIVE | Facility: HOSPITAL | Age: 74
End: 2024-07-15

## 2024-07-17 ENCOUNTER — PATIENT OUTREACH (OUTPATIENT)
Facility: CLINIC | Age: 74
End: 2024-07-17

## 2024-07-17 NOTE — PROGRESS NOTES
Health Maintenance Topic(s) Outreach Outcomes & Actions Taken:    Colorectal Cancer Screening - Outreach Outcomes & Actions Taken  : Reminded Patient to Complete Already Received Home Test       Additional Notes:

## 2024-07-26 ENCOUNTER — LAB VISIT (OUTPATIENT)
Dept: LAB | Facility: HOSPITAL | Age: 74
End: 2024-07-26
Attending: NURSE PRACTITIONER

## 2024-07-26 DIAGNOSIS — N18.32 CKD STAGE G3B/A1, GFR 30-44 AND ALBUMIN CREATININE RATIO <30 MG/G: ICD-10-CM

## 2024-07-26 DIAGNOSIS — I10 PRIMARY HYPERTENSION: ICD-10-CM

## 2024-07-26 LAB
ALBUMIN SERPL-MCNC: 4 G/DL (ref 3.4–4.8)
ALBUMIN/GLOB SERPL: 1.4 RATIO (ref 1.1–2)
ALP SERPL-CCNC: 154 UNIT/L (ref 40–150)
ALT SERPL-CCNC: 18 UNIT/L (ref 0–55)
ANION GAP SERPL CALC-SCNC: 8 MEQ/L
AST SERPL-CCNC: 23 UNIT/L (ref 5–34)
BACTERIA #/AREA URNS AUTO: ABNORMAL /HPF
BASOPHILS # BLD AUTO: 0.06 X10(3)/MCL
BASOPHILS NFR BLD AUTO: 0.8 %
BILIRUB SERPL-MCNC: 0.5 MG/DL
BILIRUB UR QL STRIP.AUTO: NEGATIVE
BUN SERPL-MCNC: 29.9 MG/DL (ref 9.8–20.1)
CALCIUM SERPL-MCNC: 9.4 MG/DL (ref 8.4–10.2)
CHLORIDE SERPL-SCNC: 110 MMOL/L (ref 98–107)
CLARITY UR: CLEAR
CO2 SERPL-SCNC: 22 MMOL/L (ref 23–31)
COLOR UR AUTO: ABNORMAL
CREAT SERPL-MCNC: 1.46 MG/DL (ref 0.55–1.02)
CREAT UR-MCNC: 115.3 MG/DL (ref 45–106)
CREAT UR-MCNC: 116.8 MG/DL (ref 45–106)
CREAT/UREA NIT SERPL: 20
EOSINOPHIL # BLD AUTO: 0.41 X10(3)/MCL (ref 0–0.9)
EOSINOPHIL NFR BLD AUTO: 5.5 %
ERYTHROCYTE [DISTWIDTH] IN BLOOD BY AUTOMATED COUNT: 12.6 % (ref 11.5–17)
GFR SERPLBLD CREATININE-BSD FMLA CKD-EPI: 38 ML/MIN/1.73/M2
GLOBULIN SER-MCNC: 2.9 GM/DL (ref 2.4–3.5)
GLUCOSE SERPL-MCNC: 97 MG/DL (ref 82–115)
GLUCOSE UR QL STRIP: NORMAL
HCT VFR BLD AUTO: 43.9 % (ref 37–47)
HGB BLD-MCNC: 14.6 G/DL (ref 12–16)
HGB UR QL STRIP: NEGATIVE
IMM GRANULOCYTES # BLD AUTO: 0.02 X10(3)/MCL (ref 0–0.04)
IMM GRANULOCYTES NFR BLD AUTO: 0.3 %
KETONES UR QL STRIP: NEGATIVE
LEUKOCYTE ESTERASE UR QL STRIP: NEGATIVE
LYMPHOCYTES # BLD AUTO: 1.84 X10(3)/MCL (ref 0.6–4.6)
LYMPHOCYTES NFR BLD AUTO: 24.5 %
MCH RBC QN AUTO: 32.4 PG (ref 27–31)
MCHC RBC AUTO-ENTMCNC: 33.3 G/DL (ref 33–36)
MCV RBC AUTO: 97.3 FL (ref 80–94)
MICROALBUMIN UR-MCNC: 5.3 UG/ML
MICROALBUMIN/CREAT RATIO PNL UR: 4.5 MG/GM CR (ref 0–30)
MONOCYTES # BLD AUTO: 0.79 X10(3)/MCL (ref 0.1–1.3)
MONOCYTES NFR BLD AUTO: 10.5 %
MUCOUS THREADS URNS QL MICRO: ABNORMAL /LPF
NEUTROPHILS # BLD AUTO: 4.39 X10(3)/MCL (ref 2.1–9.2)
NEUTROPHILS NFR BLD AUTO: 58.4 %
NITRITE UR QL STRIP: NEGATIVE
NRBC BLD AUTO-RTO: 0 %
PH UR STRIP: 6.5 [PH]
PLATELET # BLD AUTO: 215 X10(3)/MCL (ref 130–400)
PLATELETS.RETICULATED NFR BLD AUTO: 3.7 % (ref 0.9–11.2)
PMV BLD AUTO: 10 FL (ref 7.4–10.4)
POTASSIUM SERPL-SCNC: 4.9 MMOL/L (ref 3.5–5.1)
PROT SERPL-MCNC: 6.9 GM/DL (ref 5.8–7.6)
PROT UR QL STRIP: NEGATIVE
PROT UR STRIP-MCNC: 11.6 MG/DL
RBC # BLD AUTO: 4.51 X10(6)/MCL (ref 4.2–5.4)
RBC #/AREA URNS AUTO: ABNORMAL /HPF
SODIUM SERPL-SCNC: 140 MMOL/L (ref 136–145)
SP GR UR STRIP.AUTO: 1.02 (ref 1–1.03)
SQUAMOUS #/AREA URNS LPF: ABNORMAL /HPF
URINE PROTEIN/CREATININE RATIO (OLG): 0.1
UROBILINOGEN UR STRIP-ACNC: ABNORMAL
WBC # BLD AUTO: 7.51 X10(3)/MCL (ref 4.5–11.5)
WBC #/AREA URNS AUTO: ABNORMAL /HPF

## 2024-07-26 PROCEDURE — 84156 ASSAY OF PROTEIN URINE: CPT

## 2024-07-26 PROCEDURE — 82570 ASSAY OF URINE CREATININE: CPT

## 2024-07-26 PROCEDURE — 36415 COLL VENOUS BLD VENIPUNCTURE: CPT

## 2024-07-26 PROCEDURE — 81001 URINALYSIS AUTO W/SCOPE: CPT

## 2024-07-26 PROCEDURE — 85025 COMPLETE CBC W/AUTO DIFF WBC: CPT

## 2024-07-26 PROCEDURE — 81015 MICROSCOPIC EXAM OF URINE: CPT

## 2024-07-26 PROCEDURE — 80053 COMPREHEN METABOLIC PANEL: CPT

## 2024-07-31 ENCOUNTER — OFFICE VISIT (OUTPATIENT)
Dept: NEPHROLOGY | Facility: CLINIC | Age: 74
End: 2024-07-31
Payer: MEDICARE

## 2024-07-31 VITALS
DIASTOLIC BLOOD PRESSURE: 84 MMHG | SYSTOLIC BLOOD PRESSURE: 126 MMHG | HEIGHT: 63 IN | HEART RATE: 94 BPM | OXYGEN SATURATION: 94 % | BODY MASS INDEX: 39.34 KG/M2 | TEMPERATURE: 98 F | WEIGHT: 222 LBS

## 2024-07-31 DIAGNOSIS — E66.01 SEVERE OBESITY (BMI 35.0-35.9 WITH COMORBIDITY): ICD-10-CM

## 2024-07-31 DIAGNOSIS — I10 PRIMARY HYPERTENSION: ICD-10-CM

## 2024-07-31 DIAGNOSIS — N18.32 CKD STAGE G3B/A1, GFR 30-44 AND ALBUMIN CREATININE RATIO <30 MG/G: Primary | ICD-10-CM

## 2024-07-31 PROCEDURE — 99214 OFFICE O/P EST MOD 30 MIN: CPT | Mod: PBBFAC | Performed by: NURSE PRACTITIONER

## 2024-07-31 PROCEDURE — 99214 OFFICE O/P EST MOD 30 MIN: CPT | Mod: S$PBB,,, | Performed by: NURSE PRACTITIONER

## 2024-07-31 NOTE — PROGRESS NOTES
"[facility]  Nephrology Clinic Note    Chief complaint: Chronic Kidney Disease (Follow up /No complaints )    History of present illness:   Marisel Resendez is a 73 y.o. White female with past medical history of chronic kidney disease stage III, solitary kidney (patient donated a kidney to her relative in 2003), hypertension, dyslipidemia, anxiety, COPD, and DARRON (not on NIPPV). She presents for follow-up appointment in nephrology clinic.  C/o pruritus, uses emollients and antihistamines with partial success at home.     Review of Systems  12 point review of systems conducted, negative except as stated in the history of present illness.    Allergies: Patient has No Known Allergies.     Past Medical History:  has a past medical history of Anxiety disorder, unspecified, CKD (chronic kidney disease), COPD (chronic obstructive pulmonary disease), Dyslipidemia, History of radical nephrectomy, HTN (hypertension), and DARRON (obstructive sleep apnea).    Procedure History:  has a past surgical history that includes Cataract extraction w/ intraocular lens implant (Right, 08/05/2021); Cataract extraction w/ intraocular lens implant (Left, 07/13/2021); Hysterectomy (1997); bilateral oophrectomy (1990); Robot-assisted laparoscopic donor nephrectomy (Right, 2002); and Laparoscopic cholecystectomy (N/A, 8/4/2023).    Family History: family history includes Cancer in her mother; Heart attack in her father; Heart failure in her father.    Social History:  reports that she quit smoking about 7 years ago. Her smoking use included cigarettes. She has been exposed to tobacco smoke. She has never used smokeless tobacco. She reports that she does not drink alcohol and does not use drugs.    Physical exam  /84 (BP Location: Left arm, Patient Position: Sitting, BP Method: Large (Automatic))   Pulse 94   Temp 97.7 °F (36.5 °C) (Oral)   Ht 5' 2.99" (1.6 m)   Wt 100.7 kg (222 lb)   SpO2 (!) 94%   BMI 39.34 " kg/m²   General appearance: Patient is in no acute distress.  Skin: No rashes or wounds.  HEENT: PERRLA, EOMI, no scleral icterus, no JVD. Neck is supple.  Chest: Respirations are unlabored. Lungs sounds are clear.   Heart: S1, S2.   Abdomen: Benign.   : Deferred.  Extremities: No edema, peripheral pulses are palpable.   Neuro: No focal deficits.     Home Medications:    Current Outpatient Medications:     albuterol (PROVENTIL/VENTOLIN HFA) 90 mcg/actuation inhaler, Inhale 1 puff into the lungs every 4 (four) hours as needed for Wheezing or Shortness of Breath., Disp: 6.7 g, Rfl: 6    albuterol-ipratropium (DUO-NEB) 2.5 mg-0.5 mg/3 mL nebulizer solution, Take 3 mLs by nebulization 4 (four) times daily as needed for Shortness of Breath or Wheezing., Disp: 75 mL, Rfl: 6    benzonatate (TESSALON) 100 MG capsule, Take 1 capsule (100 mg total) by mouth 3 (three) times daily as needed for Cough., Disp: 30 capsule, Rfl: 1    calcium carbonate (OS-GINGER) 500 mg calcium (1,250 mg) tablet, Take 1 tablet by mouth 2 (two) times daily., Disp: , Rfl:     cetirizine (ZYRTEC) 10 MG tablet, Take 10 mg by mouth Daily., Disp: , Rfl:     diclofenac sodium (VOLTAREN) 1 % Gel, Apply topically 4 (four) times daily as needed., Disp: , Rfl:     fluticasone-umeclidin-vilanter (TRELEGY ELLIPTA) 100-62.5-25 mcg DsDv, Inhale 1 puff into the lungs once daily., Disp: 60 each, Rfl: 6    furosemide (LASIX) 20 MG tablet, Take 1 tablet (20 mg total) by mouth daily as needed (swelling)., Disp: 90 tablet, Rfl: 1    losartan (COZAAR) 100 MG tablet, Take 1 tablet (100 mg total) by mouth once daily., Disp: 90 tablet, Rfl: 3    omeprazole (PRILOSEC) 20 MG capsule, Take 1 capsule (20 mg total) by mouth once daily., Disp: 90 capsule, Rfl: 1    rosuvastatin (CRESTOR) 40 MG Tab, Take 1 tablet (40 mg total) by mouth every evening., Disp: 90 tablet, Rfl: 1    triamcinolone acetonide 0.1% (KENALOG) 0.1 % cream, Apply topically 2 (two) times daily as needed  (rash/itching)., Disp: 80 g, Rfl: 0  No current facility-administered medications for this visit.    Facility-Administered Medications Ordered in Other Visits:     diphenhydrAMINE injection 25 mg, 25 mg, Intravenous, Once PRN, Malathi Live MD    HYDROmorphone injection 0.2 mg, 0.2 mg, Intravenous, Q5 Min PRN, Malathi Live MD    HYDROmorphone injection 0.5 mg, 0.5 mg, Intravenous, Q5 Min PRN, Malathi Live MD, 0.5 mg at 08/04/23 0914    lactated ringers infusion, , Intravenous, Continuous, Malathi Live MD, Last Rate: 10 mL/hr at 08/04/23 0617, New Bag at 08/04/23 0617    LIDOcaine (PF) 10 mg/ml (1%) injection 10 mg, 1 mL, Intradermal, Once, Emma Hansen FNP    ondansetron injection 4 mg, 4 mg, Intravenous, Once, Malathi Live MD    prochlorperazine injection Soln 5 mg, 5 mg, Intravenous, Once PRN, Malathi Live MD    Laboratory data    Lab Results   Component Value Date    WBC 7.51 07/26/2024    HGB 14.6 07/26/2024    HCT 43.9 07/26/2024     07/26/2024    IRON 84 12/01/2017    TIBC 343 12/01/2017    LABIRON 24.5 12/01/2017    FERRITIN 68.3 12/01/2017     07/26/2024    K 4.9 07/26/2024    CO2 22 (L) 07/26/2024    BUN 29.9 (H) 07/26/2024    CREATININE 1.46 (H) 07/26/2024    EGFRNORACEVR 38 07/26/2024    GLUCOSE 97 07/26/2024    CALCIUM 9.4 07/26/2024    ALKPHOS 154 (H) 07/26/2024    LABPROT 6.9 07/26/2024    ALBUMIN 4.0 07/26/2024    BILIDIR 0.2 01/11/2019    IBILI 0.3 01/11/2019    AST 23 07/26/2024    ALT 18 07/26/2024    MG 2.20 01/04/2024    PHOS 3.4 01/04/2024      Lab Results   Component Value Date    HGBA1C 6.0 11/27/2023    PTH 54.7 11/08/2018    [id number] 40.08 11/08/2018     Urine:  Lab Results   Component Value Date    APPEARANCEUA Clear 07/26/2024    SGUA 1.018 07/26/2024    PROTEINUA Negative 07/26/2024    KETONESUA Negative 07/26/2024    LEUKOCYTESUR Negative 07/26/2024    RBCUA 0-5 07/26/2024    WBCUA 0-5 07/26/2024    BACTERIA None Seen  07/26/2024    SQEPUA Few 07/26/2024    HYALINECASTS 0-2 (A) 01/31/2024    CREATRANDUR 115.3 (H) 07/26/2024    CREATRANDUR 116.8 (H) 07/26/2024    PROTEINURINE 11.6 07/26/2024    UPROTCREA 0.1 07/26/2024         Imaging  (06/10/2015 08:58 CDT US Retroperitoneum Limited)  Clinical indication: Patient donated right kidney, solitary kidney.  Imaging of the retroperitoneal areas were obtained, with the right  kidney noted be surgically absent with no abnormality seen in the  right renal fossa. The left kidney is normal in appearance measuring  11.5 x 6.0 x 5.2 cm. There is no hydronephrosis, calculi, or evidence  of a solid mass. No perirenal fluid collections are demonstrated.  Renal cortex demonstrates normal echogenicity. The bladder was not  imaged.  Impression: Status post right nephrectomy with normal appearance the  left kidney.    Impression    ICD-10-CM ICD-9-CM   1. CKD stage G3b/A1, GFR 30-44 and albumin creatinine ratio <30 mg/g  N18.32 585.3   2. Primary hypertension  I10 401.9   3. Severe obesity (BMI 35.0-35.9 with comorbidity)  E66.01 278.01    Z68.35 V85.35        Plan  CKD stage G3b/A1, GFR 30-44 and albumin creatinine ratio <30 mg/g  -     Comprehensive Metabolic Panel; Future; Expected date: 01/25/2025  -     Protein/Creatinine Ratio, Urine; Future; Expected date: 01/25/2025  -     Urinalysis, Reflex to Urine Culture; Future; Expected date: 01/25/2025  Solitary kidney.  Renal function has been is stable, there is no significant proteinuria.  Continue risk factor management and periodic monitoring.  Continue:  -follow 2 g a day dietary sodium restriction  -control high blood pressure (goal blood pressure is less than 130/80, please check blood pressure twice a week and bring blood pressure logs to office visit)  -exercise at least 30 minutes a day, 5 days a week  -maintain healthy weight  -stay well hydrated (drink water only, avoid juices, sweet tea, and sodas)  -ask about staying up-to-date on  vaccinations (flu vaccine, pneumonia vaccine, hepatitis B vaccine)  -avoid excessive use of NSAIDs (ibuprofen, naproxen, Aleve, Advil, Toradol, Mobic), take Tylenol as needed for headache or mild pain  -take cholesterol lowering medications if prescribed (LDL goal less than 100)    Primary hypertension  Blood pressure reading is at goal, continue current antihypertensive regimen and 2 g a day dietary sodium restriction.      Severe obesity (BMI 35.0-35.9 with comorbidity)  Lifestyle and dietary interventions discussed, patient encouraged to maintain non-sedentary lifestyle and well-balanced diet.       Follow up in about 6 months (around 1/31/2025).     7/31/2024  Isabel Riojas NP  [facility] Nephrology

## 2024-10-18 ENCOUNTER — HOSPITAL ENCOUNTER (EMERGENCY)
Facility: HOSPITAL | Age: 74
Discharge: HOME | End: 2024-10-18
Attending: EMERGENCY MEDICINE
Payer: MEDICARE

## 2024-10-18 VITALS
WEIGHT: 220 LBS | BODY MASS INDEX: 40.48 KG/M2 | SYSTOLIC BLOOD PRESSURE: 142 MMHG | TEMPERATURE: 98 F | HEIGHT: 62 IN | OXYGEN SATURATION: 99 % | RESPIRATION RATE: 18 BRPM | DIASTOLIC BLOOD PRESSURE: 91 MMHG | HEART RATE: 80 BPM

## 2024-10-18 DIAGNOSIS — R09.81 SINUS CONGESTION: ICD-10-CM

## 2024-10-18 DIAGNOSIS — G44.89 OTHER HEADACHE SYNDROME: Primary | ICD-10-CM

## 2024-10-18 PROCEDURE — 99283 EMERGENCY DEPT VISIT LOW MDM: CPT

## 2024-10-18 RX ORDER — METHYLPREDNISOLONE 4 MG/1
TABLET ORAL
Qty: 21 EACH | Refills: 0 | Status: SHIPPED | OUTPATIENT
Start: 2024-10-18 | End: 2024-11-08

## 2024-10-18 NOTE — ED PROVIDER NOTES
ED PROVIDER NOTE  10/18/2024    CHIEF COMPLAINT:   Chief Complaint   Patient presents with    Headache     PT W CO INTERMITTENT LT SIDED ORDONEZ; BEHIND EAR, W SINUS CONGESTION, COUGH, FATIGUE, DECREASE APPETITE  AND SOB X 1 WK. NO IMPROVEMENT W OTC MEDS. PT HX OF COPD, COMPLIANT W MEDS. CURRENTLY BEING TX FOR DENTAL INFECTION,     Shortness of Breath       HISTORY OF PRESENT ILLNESS:   Mariesl Resendez is a 74 y.o. female who presents with chief complaint Headache.  Onset was 5 days ago whenever she began having intermittent sharp electrical pains left side of her head, nothing specifically aggravates it or makes it better.  She also complains of having some sinus congestion he was worried that has been a flare-up her COPD.  He states that whenever she takes the sinus medication that helps with her sinus congestion and her headache but then just comes right back after about 4-1/2 hours.  She reports having similar headaches in the past and was started on antiseizure medication that she took for a couple of months and it went away.  Denies fever, numbness or weakness in extremities, changes in vision or hearing, trouble with speech or swallowing.    The history is provided by the patient.         REVIEW OF SYSTEMS: as noted in the HPI.  NURSING NOTES REVIEWED      PAST MEDICAL/SURGICAL HISTORY:   Past Medical History:   Diagnosis Date    Anxiety disorder, unspecified     CKD (chronic kidney disease)     COPD (chronic obstructive pulmonary disease)     Dyslipidemia     History of radical nephrectomy     HTN (hypertension)     DARRON (obstructive sleep apnea)       Past Surgical History:   Procedure Laterality Date    bilateral oophrectomy  1990    CATARACT EXTRACTION W/ INTRAOCULAR LENS IMPLANT Right 08/05/2021    CATARACT EXTRACTION W/ INTRAOCULAR LENS IMPLANT Left 07/13/2021    HYSTERECTOMY  1997    LAPAROSCOPIC CHOLECYSTECTOMY N/A 8/4/2023    Procedure: CHOLECYSTECTOMY, LAPAROSCOPIC;  Surgeon: Anton Marie Jr., MD;   Location: [facility] OR;  Service: General;  Laterality: N/A;    ROBOT-ASSISTED LAPAROSCOPIC DONOR NEPHRECTOMY Right        FAMILY HISTORY:   Family History   Problem Relation Name Age of Onset    Cancer Mother Marisel Resendez     Heart failure Father      Heart attack Father         SOCIAL HISTORY:   Social History     Tobacco Use    Smoking status: Former     Current packs/day: 0.00     Types: Cigarettes     Quit date: 2017     Years since quittin.8     Passive exposure: Current    Smokeless tobacco: Never   Substance Use Topics    Alcohol use: Never    Drug use: Never       ALLERGIES: Review of patient's allergies indicates:  No Known Allergies    PHYSICAL EXAM:  Initial Vitals [10/18/24 1037]   BP Pulse Resp Temp SpO2   (!) 149/100 94 18 97.5 °F (36.4 °C) 98 %      MAP       --         Physical Exam    Nursing note and vitals reviewed.  Constitutional: She appears well-developed and well-nourished.   HENT:   Head: Normocephalic and atraumatic. Mouth/Throat: Uvula is midline and mucous membranes are normal.   Eyes: EOM are normal. Pupils are equal, round, and reactive to light.   Neck: Trachea normal. Neck supple.   Cardiovascular:  Normal rate, regular rhythm, intact distal pulses and normal pulses.           Pulmonary/Chest: Effort normal and breath sounds normal.   Abdominal: Abdomen is soft. Bowel sounds are normal. There is no abdominal tenderness. There is no rebound and no guarding.   Musculoskeletal:         General: Normal range of motion.      Cervical back: Neck supple.     Neurological: She is alert and oriented to person, place, and time. GCS eye subscore is 4. GCS verbal subscore is 5. GCS motor subscore is 6.   Skin: Skin is warm and dry.   Psychiatric: She has a normal mood and affect. Her speech is normal. Thought content normal.         RESULTS:  Labs Reviewed - No data to display  Imaging Results    None         PROCEDURES:  Procedures    ECG:       ED COURSE AND MEDICAL DECISION  MAKING:  Medications - No data to display        Medical Decision Making  74-year-old female who presents with 5 days of left-sided headache that has intermittent and feels like an electrical sensation.  States she was had this in the past and was put on some antiseizure medication that made it better.  Unable to reproduce the pain with the palpation around the suboccipital region.  She has no focal neurologic deficit on examination.  Discussed trying a dose of Medrol Dosepak and having her follow up with her PCP.  Patient was agreeable with this plan.  Given strict ED return precautions. I have spoken with the patient and/or caregivers. I have explained the patient's condition, diagnoses and treatment plan based on the information available to me at this time. I have answered the patient's and/or caregiver's questions and addressed any concerns. The patient and/or caregivers have as good an understanding of the patient's diagnosis, condition and treatment plan as can be expected at this point. The vital signs have been stable. The patient's condition is stable and appropriate for discharge from the emergency department.     The patient will pursue further outpatient evaluation with the primary care physician or other designated or consulting physician as outlined in the discharge instructions. The patient and/or caregivers are agreeable to this plan of care and follow-up instructions have been explained in detail. The patient and/or caregivers have received these instructions in written format and have expressed an understanding of the discharge instructions. The patient and/or caregivers are aware that any significant change in condition or worsening of symptoms should prompt an immediate return to this or the closest emergency department or a call to 911.    Risk  Prescription drug management.  Decision regarding hospitalization.        CLINICAL IMPRESSION:  1. Other headache syndrome    2. Sinus congestion         DISPOSITION:   ED Disposition Condition    Discharge Stable            ED Prescriptions       Medication Sig Dispense Start Date End Date Auth. Provider    methylPREDNISolone (MEDROL DOSEPACK) 4 mg tablet use as directed 21 each 10/18/2024 11/8/2024 Primo Pickard DO          Follow-up Information       Follow up With Specialties Details Why Contact Info    Ayo Duran, P Family Medicine Schedule an appointment as soon as possible for a visit   [facility] [facility] [address]  [phone]      [facility] Emergency Medicine  If symptoms worsen [address] [facility] [address] [phone]               Primo Pickard DO  10/18/24 1941

## 2024-11-02 NOTE — PROGRESS NOTES
"Subjective:    Patient ID: Marisel Resendez is a right handed 74 y.o. female  who presented to [facility] for established patient here for an new problem      Chief Complaint: Pain of the Right Wrist      History of Present Illness:    Marisel Resendez a 74-year-old female who presents to the clinic today for a new problem right thumb pain that has been going on for the past 4 weeks.  She reports that she is having pain at the base of her thumb and is worsened whenever she is sewing or cooking.  She reports the pain as a 2 to 3/10 and describes the pain as dull and achy.  She denies any numbness and tingling in her hands, dropping things, or weakness in the hands.  She reports that she has been using oral Tylenol and Voltaren cream with moderate relief.  She has not been using any types of splints or braces at this time.    Hand Review of Systems:  Swelling?  no  Instability?  no  Clicking?  no  Limited ROM? no  Fever/Chills? no  Subluxation? no  Dislocation? no  Numbness/Tingling? no  Weakness? no       Objective:      Physical Exam:    BP (!) 141/88 (BP Location: Right arm, Patient Position: Sitting)   Pulse 87   Temp 98.1 °F (36.7 °C) (Oral)   Resp 20   Ht 5' 2" (1.575 m)   Wt 98.8 kg (217 lb 12.8 oz)   SpO2 (!) 92%   BMI 39.84 kg/m²     Ortho/SPM Exam    Appearance:  Soft tissue swelling: Left: no Right: no  Effusion: Left:  Negative Right: Negative  Erythema: Left no Right: no  Ecchymosis: Left: no Right: no  Atrophy: Left: no Right: no    Palpation:  Hand/wrist Tenderness: Left: none  Right: 1st dorsal compartment and CMC joint thumb    Range of motion:  Flexion (0-80): Left:  80 Right: 80  Extension (0-70): Left:  70 Right: 70  Ulnar deviation (0-30): 30 Right: 30  Radial deviation (0-20): 20 Right: 20  Supination (0-90): Left: 90 Right: 90  Pronation (0-90): Left: 90 Right: 90  Able to make a power fist and claw hand: on Both hand(s)  Distal palmar " crease-finger tip distance: 0 on Both hand(s)    Strength:  Flexion: Left: 5/5 Pain: no Right: 5/5 Pain: no  Extension: Left: 5/5 Pain: no Right: 5/5 Pain: no  Supination: Left: 5/5 Pain: no Right: 5/5 Pain: no  Pronation: Left: 5/5 Pain: no Right: 5/5 Pain: no  Ulnar deviation: Left: 5/5 Pain: no Right: 5/5 Pain: no  Radial deviation: Left: 5/5 Pain: no Right: 5/5 Pain: no    Special Tests:  Durkans Test (Carpal Compression test): Left: Negative  Right: Negative  Tinels:  Left: Negative  Right: Negative    Phalens: Left: Negative  Right: Negative    Isaura Litler test:  Left: Not performed  Right: Not performed    UCL laxity: Left: Not performed  Right: Not performed  FDP test: Left: Negative  Right: Negative  FDS test: Left: Negative  Right: Negative  Reverse Phalens: Left: Negative Right: Negative  Finkelstein's Test: Left: Negative Right: Positive    CMC grind test:  Left negative, right:  Positive    AIN/PIN/Ulnar nerve: Intact and symmetric    Neurovascular Exam  General appearance: NAD  Peripheral pulses: normal bilaterally   Reflexes: Left: Not performed Right: Not performed   Sensation: normal  Median,Radial,Ulnar, Anterior Interosseus    Labs:  Last A1c: 6     Imaging:   Previous images reviewed.  X-rays ordered and performed today: yes  # of views: 3 Laterality: right  My Interpretation:  Distal Radial ulnar joint space is overall Normal on AP views. Scapholunate interval distance is Normal on right AP views. A DISI/VISI is not suggested on right hand series. Negative/positive ulnar variance is not suggested on lright lateral views.  no fracture, dislocation, swelling or degenerative changes noted and no fractures or dislocations noted.  Mild degenerative changes at the CMC joint.           Assessment:        Encounter Diagnoses   Code Name Primary?    M65.4 De Quervain's tenosynovitis, right Yes    M18.11 Arthritis of carpometacarpal (CMC) joint of right thumb         Plan:     Dx: right. De Quervain's  Tenosynovitis, moderate exacerbation.  Mild CMC arthritis,   Treatment Plan: Discussed with patient diagnosis and treatment recommendations.   Discussed with patient diagnosis and treatment recommendations. Handout given. Recommend conservative treatment to include: avoidance of aggravating activity, significant modification of daily activities, hot/cold therapies, topical and oral medications, braces, HEP/PT/OT, and injections.   Patient with a positive Finkelstein's test with a mildly positive CMC grind test.  She reports that the Finkelstein's test is worsened CMC grind test. Discussed with the patient of an injection today and patient declines.  She would like to continue with conservative therapies of bracing, home exercises, and oral medications.  If symptoms continue to worsen, patient reports that she will come back and for an injection at that time.    Imaging: radiological studies ordered and independently reviewed; discussed with patient; agree with radiologist interpretation.   Procedure: Discussed CSI/VSI as treatment options; discussed injections as treatment options in future if conservative measures do not improve symptoms.  Activity: Activity as tolerated  Therapy: No formal therapy  Medication: CONTINUE over-the-counter acetaminophen (Tylenol 1000 mg three times per day as needed)  CONTINUE Voltaren Gel 1% as prescribed. Please see your primary care physician for further refills.  RTC: PRN; call if any issues.         Procedures    Chevy Hansen D.O.  Sports Medicine Fellow

## 2024-11-05 ENCOUNTER — OFFICE VISIT (OUTPATIENT)
Dept: ORTHOPEDICS | Facility: CLINIC | Age: 74
End: 2024-11-05

## 2024-11-05 ENCOUNTER — HOSPITAL ENCOUNTER (OUTPATIENT)
Dept: RADIOLOGY | Facility: HOSPITAL | Age: 74
Discharge: HOME | End: 2024-11-05
Attending: STUDENT IN AN ORGANIZED HEALTH CARE EDUCATION/TRAINING PROGRAM

## 2024-11-05 VITALS
SYSTOLIC BLOOD PRESSURE: 141 MMHG | WEIGHT: 217.81 LBS | HEIGHT: 62 IN | BODY MASS INDEX: 40.08 KG/M2 | TEMPERATURE: 98 F | RESPIRATION RATE: 20 BRPM | DIASTOLIC BLOOD PRESSURE: 88 MMHG | OXYGEN SATURATION: 92 % | HEART RATE: 87 BPM

## 2024-11-05 DIAGNOSIS — M65.4 DE QUERVAIN'S TENOSYNOVITIS, RIGHT: Primary | ICD-10-CM

## 2024-11-05 DIAGNOSIS — M18.11 ARTHRITIS OF CARPOMETACARPAL (CMC) JOINT OF RIGHT THUMB: ICD-10-CM

## 2024-11-05 DIAGNOSIS — M25.531 RIGHT WRIST PAIN: ICD-10-CM

## 2024-11-05 PROCEDURE — 73110 X-RAY EXAM OF WRIST: CPT | Mod: TC,RT

## 2024-11-05 PROCEDURE — 99214 OFFICE O/P EST MOD 30 MIN: CPT | Mod: PBBFAC,25 | Performed by: STUDENT IN AN ORGANIZED HEALTH CARE EDUCATION/TRAINING PROGRAM

## 2024-11-05 RX ORDER — DICLOFENAC SODIUM 10 MG/G
2 GEL TOPICAL 4 TIMES DAILY
Qty: 100 G | Refills: 3 | Status: SHIPPED | OUTPATIENT
Start: 2024-11-05

## 2024-11-19 ENCOUNTER — LAB VISIT (OUTPATIENT)
Dept: LAB | Facility: HOSPITAL | Age: 74
End: 2024-11-19
Attending: NURSE PRACTITIONER

## 2024-11-19 ENCOUNTER — TELEPHONE (OUTPATIENT)
Dept: FAMILY MEDICINE | Facility: CLINIC | Age: 74
End: 2024-11-19

## 2024-11-19 DIAGNOSIS — I10 PRIMARY HYPERTENSION: ICD-10-CM

## 2024-11-19 DIAGNOSIS — R73.03 PREDIABETES: ICD-10-CM

## 2024-11-19 DIAGNOSIS — E78.2 MIXED HYPERLIPIDEMIA: ICD-10-CM

## 2024-11-19 LAB
ALBUMIN SERPL-MCNC: 4.1 G/DL (ref 3.4–4.8)
ALBUMIN/GLOB SERPL: 1.4 RATIO (ref 1.1–2)
ALP SERPL-CCNC: 119 UNIT/L (ref 40–150)
ALT SERPL-CCNC: 14 UNIT/L (ref 0–55)
ANION GAP SERPL CALC-SCNC: 7 MEQ/L
AST SERPL-CCNC: 23 UNIT/L (ref 5–34)
BILIRUB SERPL-MCNC: 0.7 MG/DL
BUN SERPL-MCNC: 18.1 MG/DL (ref 9.8–20.1)
CALCIUM SERPL-MCNC: 9.4 MG/DL (ref 8.4–10.2)
CHLORIDE SERPL-SCNC: 109 MMOL/L (ref 98–107)
CHOLEST SERPL-MCNC: 141 MG/DL
CHOLEST/HDLC SERPL: 3 {RATIO} (ref 0–5)
CO2 SERPL-SCNC: 25 MMOL/L (ref 23–31)
CREAT SERPL-MCNC: 1.37 MG/DL (ref 0.55–1.02)
CREAT/UREA NIT SERPL: 13
EST. AVERAGE GLUCOSE BLD GHB EST-MCNC: 116.9 MG/DL
GFR SERPLBLD CREATININE-BSD FMLA CKD-EPI: 41 ML/MIN/1.73/M2
GLOBULIN SER-MCNC: 2.9 GM/DL (ref 2.4–3.5)
GLUCOSE SERPL-MCNC: 102 MG/DL (ref 82–115)
HBA1C MFR BLD: 5.7 %
HDLC SERPL-MCNC: 56 MG/DL (ref 35–60)
LDLC SERPL CALC-MCNC: 69 MG/DL (ref 50–140)
POTASSIUM SERPL-SCNC: 4.6 MMOL/L (ref 3.5–5.1)
PROT SERPL-MCNC: 7 GM/DL (ref 5.8–7.6)
SODIUM SERPL-SCNC: 141 MMOL/L (ref 136–145)
T4 FREE SERPL-MCNC: 0.81 NG/DL (ref 0.7–1.48)
TRIGL SERPL-MCNC: 82 MG/DL (ref 37–140)
TSH SERPL-ACNC: 1.77 UIU/ML (ref 0.35–4.94)
VLDLC SERPL CALC-MCNC: 16 MG/DL

## 2024-11-19 PROCEDURE — 80061 LIPID PANEL: CPT

## 2024-11-19 PROCEDURE — 83036 HEMOGLOBIN GLYCOSYLATED A1C: CPT

## 2024-11-19 PROCEDURE — 80053 COMPREHEN METABOLIC PANEL: CPT

## 2024-11-19 PROCEDURE — 36415 COLL VENOUS BLD VENIPUNCTURE: CPT

## 2024-11-19 PROCEDURE — 84443 ASSAY THYROID STIM HORMONE: CPT

## 2024-11-19 PROCEDURE — 84439 ASSAY OF FREE THYROXINE: CPT

## 2024-11-27 ENCOUNTER — HOSPITAL ENCOUNTER (OUTPATIENT)
Dept: RADIOLOGY | Facility: HOSPITAL | Age: 74
Discharge: HOME | End: 2024-11-27
Attending: NURSE PRACTITIONER

## 2024-11-27 DIAGNOSIS — Z12.31 ENCOUNTER FOR SCREENING MAMMOGRAM FOR BREAST CANCER: ICD-10-CM

## 2024-11-27 PROCEDURE — 77067 SCR MAMMO BI INCL CAD: CPT | Mod: TC

## 2024-12-11 ENCOUNTER — OFFICE VISIT (OUTPATIENT)
Dept: FAMILY MEDICINE | Facility: CLINIC | Age: 74
End: 2024-12-11

## 2024-12-11 VITALS
HEIGHT: 62 IN | SYSTOLIC BLOOD PRESSURE: 146 MMHG | TEMPERATURE: 98 F | WEIGHT: 216 LBS | RESPIRATION RATE: 18 BRPM | DIASTOLIC BLOOD PRESSURE: 82 MMHG | HEART RATE: 77 BPM | BODY MASS INDEX: 39.75 KG/M2 | OXYGEN SATURATION: 95 %

## 2024-12-11 DIAGNOSIS — L20.9 ATOPIC DERMATITIS, UNSPECIFIED TYPE: ICD-10-CM

## 2024-12-11 DIAGNOSIS — I10 PRIMARY HYPERTENSION: ICD-10-CM

## 2024-12-11 DIAGNOSIS — Z12.11 SCREENING FOR COLORECTAL CANCER: ICD-10-CM

## 2024-12-11 DIAGNOSIS — R73.03 PREDIABETES: ICD-10-CM

## 2024-12-11 DIAGNOSIS — K21.9 GASTROESOPHAGEAL REFLUX DISEASE WITHOUT ESOPHAGITIS: ICD-10-CM

## 2024-12-11 DIAGNOSIS — J45.909 ASTHMA, UNSPECIFIED ASTHMA SEVERITY, UNSPECIFIED WHETHER COMPLICATED, UNSPECIFIED WHETHER PERSISTENT: ICD-10-CM

## 2024-12-11 DIAGNOSIS — L40.9 PSORIASIS: ICD-10-CM

## 2024-12-11 DIAGNOSIS — J44.9 CHRONIC OBSTRUCTIVE PULMONARY DISEASE, UNSPECIFIED COPD TYPE: Primary | ICD-10-CM

## 2024-12-11 DIAGNOSIS — E78.5 HYPERLIPIDEMIA, UNSPECIFIED HYPERLIPIDEMIA TYPE: ICD-10-CM

## 2024-12-11 DIAGNOSIS — G47.33 OBSTRUCTIVE SLEEP APNEA SYNDROME: ICD-10-CM

## 2024-12-11 DIAGNOSIS — Z12.12 SCREENING FOR COLORECTAL CANCER: ICD-10-CM

## 2024-12-11 DIAGNOSIS — R06.09 DYSPNEA ON EXERTION: ICD-10-CM

## 2024-12-11 DIAGNOSIS — N18.32 STAGE 3B CHRONIC KIDNEY DISEASE: ICD-10-CM

## 2024-12-11 DIAGNOSIS — R73.02 IMPAIRED GLUCOSE TOLERANCE: ICD-10-CM

## 2024-12-11 DIAGNOSIS — M17.12 OSTEOARTHRITIS OF LEFT KNEE, UNSPECIFIED OSTEOARTHRITIS TYPE: ICD-10-CM

## 2024-12-11 DIAGNOSIS — E55.9 VITAMIN D DEFICIENCY: ICD-10-CM

## 2024-12-11 DIAGNOSIS — Z23 FLU VACCINE NEED: ICD-10-CM

## 2024-12-11 DIAGNOSIS — E66.01 SEVERE OBESITY (BMI 35.0-39.9) WITH COMORBIDITY: ICD-10-CM

## 2024-12-11 DIAGNOSIS — R29.898 LEG FATIGUE: ICD-10-CM

## 2024-12-11 PROBLEM — R60.0 BILATERAL LEG EDEMA: Status: RESOLVED | Noted: 2023-05-03 | Resolved: 2024-12-11

## 2024-12-11 PROBLEM — R07.89 ATYPICAL CHEST PAIN: Status: RESOLVED | Noted: 2023-05-03 | Resolved: 2024-12-11

## 2024-12-11 PROCEDURE — 90471 IMMUNIZATION ADMIN: CPT | Mod: PBBFAC,PN

## 2024-12-11 PROCEDURE — 99214 OFFICE O/P EST MOD 30 MIN: CPT | Mod: PBBFAC,PN | Performed by: NURSE PRACTITIONER

## 2024-12-11 PROCEDURE — 90653 IIV ADJUVANT VACCINE IM: CPT | Mod: PBBFAC,PN

## 2024-12-11 PROCEDURE — 99204 OFFICE O/P NEW MOD 45 MIN: CPT | Mod: S$PBB,,, | Performed by: NURSE PRACTITIONER

## 2024-12-11 RX ADMIN — INFLUENZA A VIRUS A/VICTORIA/4897/2022 IVR-238 (H1N1) ANTIGEN (FORMALDEHYDE INACTIVATED), INFLUENZA A VIRUS A/THAILAND/8/2022 IVR-237 (H3N2) ANTIGEN (FORMALDEHYDE INACTIVATED), INFLUENZA B VIRUS B/AUSTRIA/1359417/2021 BVR-26 ANTIGEN (FORMALDEHYDE INACTIVATED) 0.5 ML: 15; 15; 15 INJECTION, SUSPENSION INTRAMUSCULAR at 09:12

## 2024-12-11 NOTE — ASSESSMENT & PLAN NOTE
Chronic, stable issue.  Disease is chronic in nature and will consist of flares and remission.  Consider going gluten free in your diet to help with symptoms.  Severity increases with cold climates.  Maintain good skin hygiene with daily baths or showers.  Avoid harsh soaps.  Avoid skin injury, including harsh scrubbing, which can trigger new outbreaks.  Avoid skin dryness.  To reduce scaling, use nonprescriptions, waterless cleansers, and hair preparations containing coal tar or products containing cortisone.  Expose the skin to moderate amounts of sunlight as often as possible. Avoid long periods in sun to prevent sunburn.  Oatmeal baths may loosen scales. 1 cup of oatmeal to a tub of warm water.  Stress may increase outbreaks. Consider counseling to assist in lifestyle changes, coping or any psychological problems caused by psoriasis.

## 2024-12-11 NOTE — ASSESSMENT & PLAN NOTE
HgA1c: 5.7%  Prediabetes: 5.7%-6.4%  Diabetes: 6.5% or greater  Recommendations:  Educated on a diabetic diet- Low-fat, Low-carb, Low-cholesterol. Instructed to avoid excessive intake of sugary/dark drinks/sodas and white foods (i.e., bread, pasta, potatoes, rice).  Encouraged aerobic exercise: 20-30 min/day x 5 days/week.     Noted.   Forwarded to [facility].

## 2024-12-11 NOTE — ASSESSMENT & PLAN NOTE
Patient states her legs have not been worked up for blockages. The peripheral edema was felt to be 2/2 amlodipine which she stopped and the edema is still present but not pitting.

## 2024-12-11 NOTE — PROGRESS NOTES
Patient Name: Marisel Resendez     : 1950    MRN: [id number]     Subjective:     Patient ID: Marisel Resendez is a 74 y.o. female.    Chief Complaint:   Chief Complaint   Patient presents with    Establish Care     Pt is here to establish care with PCP. Pt needs PAP form completed for Trelegy        HPI: 24:  Pt is here to establish care with PCP. Pt needs PAP form completed for Trelegy. Patient states for the most part the only thing that bothers her from time to time is her COPD. The Trelegy helps and she has been out and she also has home oxygen that she uses periodically but does not want to be dependent on that.  Hx eczema and psoriasis.  Also hx asthma, COPD.  She has had her heart evaluated in the past with echo.  States at times when she walks her legs will get tired. States that she has never had angiogram on her legs.  Has not had CRC for  yet.  Has been doing FIT testing in the past.  All other metrics are met. She does not do PAP smears any longer.        Anxiety disorder, unspecified-  Chronic, stable issue    Atypical chest pain-  This has kind of resolved.     Bilateral leg edema-  This is still present but is not as bad. She has prn Lasix as needed for this issue. She sees Nephrology for HTN and CKD.     CKD (chronic kidney disease)/History of radical nephrectomy-  Established with Nephrology.     COPD (chronic obstructive pulmonary disease)-  Uses Trelegy and has home oxygen.     Dyslipidemia-  Last lipid panel was WNL.  She is on Lipitor.     HTN (hypertension)-  States that her BP is all over the place. She ranges from systolic 120's-150's.  Diastolic is always normal.     DARRON (obstructive sleep apnea)-  Chronic, stable issue    Psoriasis-  Chronic, stable issue. Has creams to treat this    Asthma-  Is on Trelegy.     Eczema-  Triamcinolone as needed.           ROS:      Review of Systems   Respiratory:  Positive for wheezing. Negative for sputum production and shortness of breath.     All other systems reviewed and are negative.           History:     Past Medical History:   Diagnosis Date    Anxiety disorder, unspecified     Atypical chest pain 2023    Bilateral leg edema 2023    CKD (chronic kidney disease)     COPD (chronic obstructive pulmonary disease)     Dyslipidemia     History of radical nephrectomy     HTN (hypertension)     DARRON (obstructive sleep apnea)         Past Surgical History:   Procedure Laterality Date    bilateral oophrectomy      CATARACT EXTRACTION W/ INTRAOCULAR LENS IMPLANT Right 2021    CATARACT EXTRACTION W/ INTRAOCULAR LENS IMPLANT Left 2021    CHOLECYSTECTOMY      EYE SURGERY  2021    cataract    HYSTERECTOMY      LAPAROSCOPIC CHOLECYSTECTOMY N/A 2023    Procedure: CHOLECYSTECTOMY, LAPAROSCOPIC;  Surgeon: Anton Marie Jr., MD;  Location: [facility];  Service: General;  Laterality: N/A;    ROBOT-ASSISTED LAPAROSCOPIC DONOR NEPHRECTOMY Right        Family History   Problem Relation Name Age of Onset    Cancer Mother Marisel Resendez     Heart failure Father      Heart attack Father          Social History     Tobacco Use    Smoking status: Former     Current packs/day: 0.00     Types: Cigarettes     Quit date: 2017     Years since quittin.9     Passive exposure: Current    Smokeless tobacco: Never   Substance and Sexual Activity    Alcohol use: Never    Drug use: Never    Sexual activity: Not Currently     Partners: Male     Birth control/protection: Abstinence       Current Outpatient Medications   Medication Instructions    albuterol (PROVENTIL/VENTOLIN HFA) 90 mcg/actuation inhaler 1 puff, Inhalation, Every 4 hours PRN    albuterol-ipratropium (DUO-NEB) 2.5 mg-0.5 mg/3 mL nebulizer solution 3 mLs, Nebulization, 4 times daily PRN    benzonatate (TESSALON) 100 mg, Oral, 3 times daily PRN    calcium carbonate (OS-GINGER) 500 mg calcium (1,250 mg) tablet 1 tablet, 2 times daily    cetirizine (ZYRTEC) 10 mg, Daily     "diclofenac sodium (VOLTAREN ARTHRITIS PAIN) 2 g, Topical (Top), 4 times daily, Do not exceed 32 grams/day: do not to exceed 8 grams/day/single joint of upper extremities; do not to exceed 16 grams/day/single joint of lower extremities.  Please request refill of this medication from your PCP.    fluticasone-umeclidin-vilanter (TRELEGY ELLIPTA) 100-62.5-25 mcg DsDv 1 puff, Inhalation, Daily    furosemide (LASIX) 20 mg, Oral, Daily PRN    losartan (COZAAR) 100 mg, Oral, Daily    omeprazole (PRILOSEC) 20 mg, Oral, Daily    rosuvastatin (CRESTOR) 40 mg, Oral, Nightly    triamcinolone acetonide 0.1% (KENALOG) 0.1 % cream Topical (Top), 2 times daily PRN        Review of patient's allergies indicates:  No Known Allergies    Objective:     Visit Vitals  BP (!) 146/82 (BP Location: Left arm, Patient Position: Sitting)   Pulse 77   Temp 98.3 °F (36.8 °C) (Oral)   Resp 18   Ht 5' 2" (1.575 m)   Wt 98 kg (216 lb)   SpO2 95%   BMI 39.51 kg/m²       Physical Examination:     Physical Exam  Vitals reviewed.   Constitutional:       Appearance: Normal appearance. She is normal weight.   HENT:      Head: Normocephalic.   Cardiovascular:      Rate and Rhythm: Normal rate and regular rhythm.      Pulses: Normal pulses.      Heart sounds: Normal heart sounds.   Pulmonary:      Effort: Pulmonary effort is normal.      Breath sounds: Examination of the right-upper field reveals wheezing. Examination of the left-upper field reveals wheezing. Examination of the right-middle field reveals wheezing. Examination of the left-middle field reveals wheezing. Examination of the right-lower field reveals decreased breath sounds. Examination of the left-lower field reveals decreased breath sounds. Decreased breath sounds and wheezing present. No rhonchi or rales.   Abdominal:      General: Abdomen is flat.      Palpations: Abdomen is soft.   Musculoskeletal:         General: Normal range of motion.      Cervical back: Normal range of motion. "   Skin:     General: Skin is warm and dry.   Neurological:      Mental Status: She is alert.   Psychiatric:         Mood and Affect: Mood normal.         Lab Results:     Chemistry:  Lab Results   Component Value Date     11/19/2024    K 4.6 11/19/2024    BUN 18.1 11/19/2024    CREATININE 1.37 (H) 11/19/2024    EGFRNORACEVR 41 11/19/2024    GLUCOSE 102 11/19/2024    CALCIUM 9.4 11/19/2024    ALKPHOS 119 11/19/2024    LABPROT 7.0 11/19/2024    ALBUMIN 4.1 11/19/2024    BILIDIR 0.2 01/11/2019    IBILI 0.3 01/11/2019    AST 23 11/19/2024    ALT 14 11/19/2024    MG 2.20 01/04/2024    PHOS 3.4 01/04/2024    RMSFSTKU89CC 40.08 11/08/2018    TSH 1.769 11/19/2024    MOCBGT7DBDN 0.81 11/19/2024        Lab Results   Component Value Date    HGBA1C 5.7 11/19/2024        Hematology:  Lab Results   Component Value Date    WBC 7.51 07/26/2024    HGB 14.6 07/26/2024    HCT 43.9 07/26/2024     07/26/2024       Lipid Panel:  Lab Results   Component Value Date    CHOL 141 11/19/2024    HDL 56 11/19/2024    LDL 69.00 11/19/2024    TRIG 82 11/19/2024    TOTALCHOLEST 3 11/19/2024        Urine:  Lab Results   Component Value Date    APPEARANCEUA Clear 07/26/2024    SGUA 1.018 07/26/2024    PROTEINUA Negative 07/26/2024    KETONESUA Negative 07/26/2024    LEUKOCYTESUR Negative 07/26/2024    RBCUA 0-5 07/26/2024    WBCUA 0-5 07/26/2024    BACTERIA None Seen 07/26/2024    SQEPUA Few 07/26/2024    HYALINECASTS 0-2 (A) 01/31/2024    CREATRANDUR 115.3 (H) 07/26/2024    CREATRANDUR 116.8 (H) 07/26/2024    PROTEINURINE 11.6 07/26/2024    UPROTCREA 0.1 07/26/2024        Assessment:          ICD-10-CM ICD-9-CM   1. Chronic obstructive pulmonary disease, unspecified COPD type  J44.9 496   2. Flu vaccine need  Z23 V04.81   3. Psoriasis  L40.9 696.1   4. Atopic dermatitis, unspecified type  L20.9 691.8   5. Asthma, unspecified asthma severity, unspecified whether complicated, unspecified whether persistent  J45.909 493.90   6. Primary  hypertension  I10 401.9   7. Hyperlipidemia, unspecified hyperlipidemia type  E78.5 272.4   8. Dyspnea on exertion  R06.09 786.09   9. Stage 3b chronic kidney disease  N18.32 585.3   10. Vitamin D deficiency  E55.9 268.9   11. Severe obesity (BMI 35.0-39.9) with comorbidity  E66.01 278.01   12. Prediabetes  R73.03 790.29   13. Impaired glucose tolerance  R73.02 790.22   14. Screening for colorectal cancer  Z12.11 V76.51    Z12.12 V76.41   15. Gastroesophageal reflux disease without esophagitis  K21.9 530.81   16. Osteoarthritis of left knee, unspecified osteoarthritis type  M17.12 715.96   17. Leg fatigue  R29.898 729.89   18. Obstructive sleep apnea syndrome  G47.33 327.23        Plan:     1. Chronic obstructive pulmonary disease, unspecified COPD type  Overview:  PFTs 2/28/23: Nonspecific decrease in flow rates with no measured obstruction or restriction      Assessment & Plan:  Managed with Trelegy once daily, albuterol inhaler as needed, she does have home O2 that she uses at night as needed, last hospitalization noted January 20, 2024 and she has been feeling well and stable.  She does take Tessalon as needed for increased coughing.      2. Flu vaccine need  -     influenza (adjuvanted) (Fluad) 45 mcg/0.5 mL IM vaccine (> or = 66 yo) 0.5 mL    3. Psoriasis  Assessment & Plan:  Chronic, stable issue.  Disease is chronic in nature and will consist of flares and remission.  Consider going gluten free in your diet to help with symptoms.  Severity increases with cold climates.  Maintain good skin hygiene with daily baths or showers.  Avoid harsh soaps.  Avoid skin injury, including harsh scrubbing, which can trigger new outbreaks.  Avoid skin dryness.  To reduce scaling, use nonprescriptions, waterless cleansers, and hair preparations containing coal tar or products containing cortisone.  Expose the skin to moderate amounts of sunlight as often as possible. Avoid long periods in sun to prevent sunburn.  Oatmeal  "baths may loosen scales. 1 cup of oatmeal to a tub of warm water.  Stress may increase outbreaks. Consider counseling to assist in lifestyle changes, coping or any psychological problems caused by psoriasis.         4. Atopic dermatitis, unspecified type  Assessment & Plan:  Continue triamcinolone.       5. Asthma, unspecified asthma severity, unspecified whether complicated, unspecified whether persistent  Assessment & Plan:  Continue Trelegy  Use your rescue inhaler and nebulizer for acute asthma symptoms when they occur. These are your rescue medications and help to relax tight muscles around your airways. If you are having to use these medications more than 2x per week, please notify the clinic as this could indicate poor asthma control.  Avoid asthma triggers (i.e., pet dander, molds, dust mites, cockroaches, pollen, cigarette smoke, respiratory illnesses, etc)  Stay up-to-date with immunizations, especially the influenza and pneumonia vaccinations        6. Primary hypertension  Overview:  BP Readings from Last 3 Encounters:   12/11/24 (!) 146/82   11/05/24 (!) 141/88   10/18/24 (!) 142/91         Assessment & Plan:  Nephrology manages. Losartan 100mg po daily to continue  Low Sodium Diet (Dash Diet - less than 2 grams of sodium per day).  Monitor Blood Pressure daily and log. Report any consistent numbers greater than 140/90.  Smoking Cessation encouraged to aid in BP reduction.  Maintain healthy weight with goal BMI <30. Exercise 30 minutes per day 5 days per week        7. Hyperlipidemia, unspecified hyperlipidemia type  Overview:  Lab Results   Component Value Date    CHOL 141 11/19/2024    CHOL 133 11/27/2023    CHOL 142 04/24/2023     Lab Results   Component Value Date    HDL 56 11/19/2024    HDL 51 11/27/2023    HDL 52 04/24/2023     No results found for: "LDLCALC"  Lab Results   Component Value Date    TRIG 82 11/19/2024    TRIG 119 11/27/2023    TRIG 89 04/24/2023       No results found for: " ""CHOLHDL"      Assessment & Plan:  Stressed importance of dietary modifications. Follow a low cholesterol, low saturated fat diet with less that 200mg of cholesterol a day.  Avoid fried foods and high saturated fats (high saturated fats less than 7% of calories).  Add Flax Seed/Fish Oil supplements to diet. Increase dietary fiber.  Regular exercise can reduce LDL and raise HDL. Stressed importance of physical activity 5 times per week for 30 minutes per day.           8. Dyspnea on exertion  Assessment & Plan:  Echo  PFTs  See "COPD"  Use O2 as needed  ED precautions      9. Stage 3b chronic kidney disease  Assessment & Plan:  Stable, chronic.  Avoid NSAIDs, avoid nephrotoxic agents, continue follow-up with Nephrology.  Continue to hydrate well daily.  Reviewed trending labs.  Discussed renal diet advice.  Discussed prevention of worsening renal indices and how to maintain healthy kidney function. Maintain optimal blood pressure control.        10. Vitamin D deficiency  Assessment & Plan:  Vitamin D will be repeated in 6 months      11. Severe obesity (BMI 35.0-39.9) with comorbidity  Overview:  Wt Readings from Last 3 Encounters:   12/11/24 0856 98 kg (216 lb)   11/05/24 0740 98.8 kg (217 lb 12.8 oz)   10/18/24 1037 99.8 kg (220 lb 0.3 oz)         Assessment & Plan:  Encourage weight loss and exercise as tolerated at this time.      12. Prediabetes  Assessment & Plan:  Lab Results   Component Value Date    HGBA1C 5.7 11/19/2024    HGBA1C 6.0 11/27/2023    LDL 69.00 11/19/2024    CREATININE 1.37 (H) 11/19/2024       Discussed importance of diabetes prevention through lifestyle and dietary modifications.    Advised of ADA, low carb, low-fat, low-cholesterol diet, and importance of portion control.  Advised of increasing aerobic activity as tolerated and healthy weight maintenance.  Avoid soda, simple sweets, and limit rice/pasta/breads/starches (no more than 45-50 grams per meal).  Maintain healthy weight with goal " BMI <30.  Exercise 5 times per week for 30 minutes per day.  Discussed importance of lifestyle and dietary modifications to prevent diabetes.            13. Impaired glucose tolerance  Assessment & Plan:  HgA1c: 5.7%  Prediabetes: 5.7%-6.4%  Diabetes: 6.5% or greater  Recommendations:  Educated on a diabetic diet- Low-fat, Low-carb, Low-cholesterol. Instructed to avoid excessive intake of sugary/dark drinks/sodas and white foods (i.e., bread, pasta, potatoes, rice).  Encouraged aerobic exercise: 20-30 min/day x 5 days/week.        14. Screening for colorectal cancer  Overview:  Health Maintenance:   Colon Ca Screening-FIT 7/28/21, negative; 5/3/23, negative   Breast Ca Screening-Mammo 11/7/2022, negative; 11/27/23, negative   Lung Ca Screening-declines   Bone Density 11/7/2022, normal BMD      Assessment & Plan:  FIT kit provided.       15. Gastroesophageal reflux disease without esophagitis  Assessment & Plan:  Counseled to avoid trigger foods.  Healthy weight maintenance advised.  Avoid lying down for at least 30 minutes after eating.  Best eat small frequent meals.  Practice low carb, low-fat, low-cholesterol diet.  May continue PPI therapy, precautions advised.  Smoking cessation advised (if a smoker).        16. Osteoarthritis of left knee, unspecified osteoarthritis type  Assessment & Plan:  Pt had been following Ortho. S/p CSI. Doing better      17. Leg fatigue  Assessment & Plan:  Patient states her legs have not been worked up for blockages. The peripheral edema was felt to be 2/2 amlodipine which she stopped and the edema is still present but not pitting.       18. Obstructive sleep apnea syndrome  Assessment & Plan:  Scheduled for sleep test 12/6/23           No follow-ups on file.    Future Appointments   Date Time Provider Department Center   12/17/2024  9:30 AM Chevy Hansen DO [facility] ORTHO [facility]   2/3/2025  9:30 AM Isabel Riojas FNP [facility] NEPHR [facility]   5/12/2025 11:00 AM PROVIDERS, NATASHA  OPHTH [facility]   6/11/2025  9:40 AM Stephanie Iraheta FNP [facility]        [place]

## 2024-12-11 NOTE — ASSESSMENT & PLAN NOTE
Nephrology manages. Losartan 100mg po daily to continue  Low Sodium Diet (Dash Diet - less than 2 grams of sodium per day).  Monitor Blood Pressure daily and log. Report any consistent numbers greater than 140/90.  Smoking Cessation encouraged to aid in BP reduction.  Maintain healthy weight with goal BMI <30. Exercise 30 minutes per day 5 days per week

## 2024-12-17 ENCOUNTER — OFFICE VISIT (OUTPATIENT)
Dept: ORTHOPEDICS | Facility: CLINIC | Age: 74
End: 2024-12-17

## 2024-12-17 VITALS
HEIGHT: 62 IN | WEIGHT: 218.25 LBS | DIASTOLIC BLOOD PRESSURE: 82 MMHG | HEART RATE: 85 BPM | SYSTOLIC BLOOD PRESSURE: 140 MMHG | BODY MASS INDEX: 40.16 KG/M2

## 2024-12-17 DIAGNOSIS — M65.4 DE QUERVAIN'S TENOSYNOVITIS, RIGHT: Primary | ICD-10-CM

## 2024-12-17 PROCEDURE — 99213 OFFICE O/P EST LOW 20 MIN: CPT | Mod: PBBFAC | Performed by: STUDENT IN AN ORGANIZED HEALTH CARE EDUCATION/TRAINING PROGRAM

## 2024-12-17 PROCEDURE — 20550 NJX 1 TENDON SHEATH/LIGAMENT: CPT | Mod: PBBFAC,RT | Performed by: STUDENT IN AN ORGANIZED HEALTH CARE EDUCATION/TRAINING PROGRAM

## 2024-12-17 RX ORDER — LIDOCAINE HYDROCHLORIDE 10 MG/ML
1 INJECTION, SOLUTION EPIDURAL; INFILTRATION; INTRACAUDAL; PERINEURAL
Status: COMPLETED | OUTPATIENT
Start: 2024-12-17 | End: 2024-12-17

## 2024-12-17 RX ORDER — TRIAMCINOLONE ACETONIDE 40 MG/ML
40 INJECTION, SUSPENSION INTRA-ARTICULAR; INTRAMUSCULAR ONCE
Status: COMPLETED | OUTPATIENT
Start: 2024-12-17 | End: 2024-12-17

## 2024-12-17 RX ADMIN — LIDOCAINE HYDROCHLORIDE 10 MG: 10 INJECTION, SOLUTION EPIDURAL; INFILTRATION; INTRACAUDAL; PERINEURAL at 10:12

## 2024-12-17 RX ADMIN — TRIAMCINOLONE ACETONIDE 40 MG: 40 INJECTION, SUSPENSION INTRA-ARTICULAR; INTRAMUSCULAR at 10:12

## 2024-12-17 NOTE — PROGRESS NOTES
"Subjective:    Patient ID: Marisel Resendez is a right handed 74 y.o. female  who presented to [facility] for follow up..      Chief Complaint: Pain of the Right Wrist      History of Present Illness:    Marisel Resendez is a 74-year-old female who presents to the clinic today for follow up on her right de Quervain tenosynovitis that has been ongoing for about 8 weeks.  Patient has been using her thumb spica splint, topical medications, and oral medications without any significant relief.  She reports that her pain today is a 6/10 and describes it as dull and achy.  She has tried resting and using her brace as much as possible but she has been doing a significant amount of quilting and sewing which does exacerbate her pain.  Patient would like to have a corticosteroid injection today.    Hand Review of Systems:  Swelling?  no  Instability?  no  Clicking?  no  Limited ROM? no  Fever/Chills? no  Subluxation? no  Dislocation? no  Numbness/Tingling? no  Weakness? no       Objective:      Physical Exam:    BP (!) 140/82   Pulse 85   Ht 5' 2" (1.575 m)   Wt 99 kg (218 lb 4.1 oz)   BMI 39.92 kg/m²     Ortho/SPM Exam    Appearance:  Soft tissue swelling: Left: no Right: no  Effusion: Left:  Negative Right: Negative  Erythema: Left no Right: no  Ecchymosis: Left: no Right: no  Atrophy: Left: no Right: no    Palpation:  Hand/wrist Tenderness: Left: none  Right: 1st dorsal compartment    Range of motion:  Flexion (0-80): Left:  80 Right: 80  Extension (0-70): Left:  70 Right: 70  Ulnar deviation (0-30): 30 Right: 30  Radial deviation (0-20): 20 Right: 20  Supination (0-90): Left: 90 Right: 90  Pronation (0-90): Left: 90 Right: 90  Able to make a power fist and claw hand: on Both hand(s)  Distal palmar crease-finger tip distance: 0 on Both hand(s)    Strength:  Flexion: Left: 5/5 Pain: no Right: 5/5 Pain: no  Extension: Left: 5/5 Pain: no Right: 5/5 Pain: no  Supination: " Left: 5/5 Pain: no Right: 5/5 Pain: no  Pronation: Left: 5/5 Pain: no Right: 5/5 Pain: no  Ulnar deviation: Left: 5/5 Pain: no Right: 5/5 Pain: no  Radial deviation: Left: 5/5 Pain: no Right: 5/5 Pain: no    Special Tests:  Durkans Test (Carpal Compression test): Left: Negative  Right: Negative  Tinels:  Left: Negative  Right: Negative    CMC grind: Left: Negative  Right: Negative  FDP test: Left: Negative  Right: Negative  FDS test: Left: Negative  Right: Negative  Finkelstein's Test: Left: Negative Right: Positive    Froments: Left: Not performed Right: Not performed  Shuck Test: Left: Not performed Right: Not performed    AIN/PIN/Ulnar nerve: Intact and symmetric    Neurovascular Exam  General appearance: NAD  Peripheral pulses: normal bilaterally   Reflexes: Left: Not performed Right: Not performed   Sensation: normal  Median,Radial,Ulnar, Anterior Interosseus    Labs:  Last A1c: 5.7     Imaging:   Previous images reviewed.  X-rays ordered and performed today: no  Bedside ultrasound shows significant thickening of the 1st dorsal compartment tendons with tenosynovitis.  Increase Doppler blood flow noted on ultrasound.       Assessment:        Encounter Diagnoses   Code Name Primary?    M65.4 De Quervain's tenosynovitis, right Yes        Plan:     Dx:  Right de Quervain tenosynovitis, moderate exacerbation  Treatment Plan: Discussed with patient diagnosis and treatment recommendations. Handout given. Recommend conservative treatment to include: avoidance of aggravating activity, significant modification of daily activities, hot/cold therapies, topical and oral medications, braces, HEP/PT/OT, and injections. We will give patient a corticosteroid injection today.  Counseled patient to continue doing exercises and using her brace as much as possible.  Counseled that she should continue using her topical and oral medications in relief.  We will see patient back as needed.    Imaging: prior radiological studies  independently reviewed; discussed with patient; agree with radiologist interpretation.   Procedure: Discussed CSI as treatment options; discussed injections as treatment options; since conservative measures did not improve symptoms patient consented for CSI today.  Activity: Activity as tolerated  Therapy: No formal therapy  Medication: CONTINUE over-the-counter acetaminophen (Tylenol 1000 mg three times per day as needed)  CONTINUE Voltaren Gel 1% as prescribed. Please see your primary care physician for further refills.  RTC: PRN; call if any issues.         1st Dorsal Compartment    Date/Time: 12/17/2024 9:30 AM    Performed by: Chevy Hansen DO  Authorized by: Chevy Hansen, DO    Consent Done?:  Yes (Written)  Indications:  Pain  Site marked: the procedure site was marked    Timeout: prior to procedure the correct patient, procedure, and site was verified    Local anesthesia used?: Yes    Location:  Thumb  Site:  R thumb extension tendon sheath  Ultrasonic guidance for needle placement?: Yes    Needle size:  25 G  Approach:  Dorsal  Patient tolerance:  Patient tolerated the procedure well with no immediate complications    Additional Comments: Staff: Yoel Levy MD    Risks:  Possible complications with the injection include bleeding, infection (.01%), tendon rupture, steroid flare, fat pad or soft tissue atrophy, skin depigmentation, allergic reaction to medications and vasovagal response. (steroid flare treatment is rest, ice, NSAIDs and resolves in 24-36 hours.)    Consent:  No absolute contraindications (cellulitis overlying joint, infection, lack of informed consent, allergy to injection medication, AVN protein or egg allergy for sodium hyaluronate, or history of steroid flare) or relative contraindications (uncontrolled DM2 A1c>10, coagulopathy, INR > 3.5, previous joint replacement or history of AVN).        Description:  The patient was prepped in normal sterile fashion use of chlorhexidine scrub and  the appropriate and anatomic landmarks were identified with ultrasound.  Contents of syringe included: 1cc of 1% lidocaine with 40mg of Kenalog    Post Procedure: Patient alert, and moving all extremities. ROM improved, pain decreased.  Good peripheral pulses, no signs of vascular compromise and range of motion intact.  Aftercare instructions were given to patient at time of discharge.  Relative rest for 3 days-avoiding excess activity.  Place ice on the area for 15 minutes every 4-6 hours. Patient may take Tylenol a 1000 mg b.i.d. or ibuprofen 600 mg t.i.d. for the next 3-4 days if not on medication already and safe to take pending co-morbidities.  Protect the area for the next 1-8 hours if anesthetic was used.  Avoid excessive activity for the next 3-4 weeks.  ER precautions given for fever, severe joint pain or allergic reaction or other new symptoms related to the joint injection.             Chevy Hansen D.O.  Sports Medicine Fellow

## 2025-01-07 ENCOUNTER — PATIENT MESSAGE (OUTPATIENT)
Dept: NEPHROLOGY | Facility: CLINIC | Age: 75
End: 2025-01-07

## 2025-01-07 RX ORDER — AMLODIPINE BESYLATE 5 MG/1
5 TABLET ORAL DAILY
Qty: 30 TABLET | Refills: 0 | Status: SHIPPED | OUTPATIENT
Start: 2025-01-07 | End: 2025-02-06

## 2025-01-24 ENCOUNTER — PATIENT MESSAGE (OUTPATIENT)
Dept: NEPHROLOGY | Facility: CLINIC | Age: 75
End: 2025-01-24

## 2025-01-31 ENCOUNTER — LAB VISIT (OUTPATIENT)
Dept: LAB | Facility: HOSPITAL | Age: 75
End: 2025-01-31
Attending: NURSE PRACTITIONER

## 2025-01-31 DIAGNOSIS — N18.32 CKD STAGE G3B/A1, GFR 30-44 AND ALBUMIN CREATININE RATIO <30 MG/G: ICD-10-CM

## 2025-01-31 LAB
ALBUMIN SERPL-MCNC: 3.9 G/DL (ref 3.4–4.8)
ALBUMIN/GLOB SERPL: 1.1 RATIO (ref 1.1–2)
ALP SERPL-CCNC: 111 UNIT/L (ref 40–150)
ALT SERPL-CCNC: 18 UNIT/L (ref 0–55)
ANION GAP SERPL CALC-SCNC: 6 MEQ/L
AST SERPL-CCNC: 25 UNIT/L (ref 5–34)
BACTERIA #/AREA URNS AUTO: ABNORMAL /HPF
BILIRUB SERPL-MCNC: 0.6 MG/DL
BILIRUB UR QL STRIP.AUTO: NEGATIVE
BUN SERPL-MCNC: 22.1 MG/DL (ref 9.8–20.1)
CALCIUM SERPL-MCNC: 9.6 MG/DL (ref 8.4–10.2)
CHLORIDE SERPL-SCNC: 108 MMOL/L (ref 98–107)
CLARITY UR: CLEAR
CO2 SERPL-SCNC: 26 MMOL/L (ref 23–31)
COLOR UR AUTO: COLORLESS
CREAT SERPL-MCNC: 1.25 MG/DL (ref 0.55–1.02)
CREAT UR-MCNC: 23.2 MG/DL (ref 45–106)
CREAT/UREA NIT SERPL: 18
GFR SERPLBLD CREATININE-BSD FMLA CKD-EPI: 46 ML/MIN/1.73/M2
GLOBULIN SER-MCNC: 3.4 GM/DL (ref 2.4–3.5)
GLUCOSE SERPL-MCNC: 93 MG/DL (ref 82–115)
GLUCOSE UR QL STRIP: NORMAL
HGB UR QL STRIP: NEGATIVE
HYALINE CASTS #/AREA URNS LPF: ABNORMAL /LPF
KETONES UR QL STRIP: NEGATIVE
LEUKOCYTE ESTERASE UR QL STRIP: NEGATIVE
NITRITE UR QL STRIP: NEGATIVE
PH UR STRIP: 6 [PH]
POTASSIUM SERPL-SCNC: 4.6 MMOL/L (ref 3.5–5.1)
PROT SERPL-MCNC: 7.3 GM/DL (ref 5.8–7.6)
PROT UR QL STRIP: NEGATIVE
PROT UR STRIP-MCNC: <6.8 MG/DL
RBC #/AREA URNS AUTO: ABNORMAL /HPF
SODIUM SERPL-SCNC: 140 MMOL/L (ref 136–145)
SP GR UR STRIP.AUTO: 1.01 (ref 1–1.03)
SQUAMOUS #/AREA URNS LPF: ABNORMAL /HPF
UROBILINOGEN UR STRIP-ACNC: NORMAL
WBC #/AREA URNS AUTO: ABNORMAL /HPF

## 2025-01-31 PROCEDURE — 80053 COMPREHEN METABOLIC PANEL: CPT

## 2025-01-31 PROCEDURE — 81001 URINALYSIS AUTO W/SCOPE: CPT

## 2025-01-31 PROCEDURE — 84156 ASSAY OF PROTEIN URINE: CPT

## 2025-01-31 PROCEDURE — 36415 COLL VENOUS BLD VENIPUNCTURE: CPT

## 2025-02-03 ENCOUNTER — OFFICE VISIT (OUTPATIENT)
Dept: NEPHROLOGY | Facility: CLINIC | Age: 75
End: 2025-02-03

## 2025-02-03 VITALS
BODY MASS INDEX: 40.92 KG/M2 | DIASTOLIC BLOOD PRESSURE: 74 MMHG | WEIGHT: 222.38 LBS | HEART RATE: 78 BPM | SYSTOLIC BLOOD PRESSURE: 116 MMHG | OXYGEN SATURATION: 95 % | HEIGHT: 62 IN | RESPIRATION RATE: 20 BRPM | TEMPERATURE: 98 F

## 2025-02-03 DIAGNOSIS — I10 PRIMARY HYPERTENSION: ICD-10-CM

## 2025-02-03 DIAGNOSIS — E78.2 MIXED HYPERLIPIDEMIA: ICD-10-CM

## 2025-02-03 DIAGNOSIS — E66.01 SEVERE OBESITY (BMI >= 40): ICD-10-CM

## 2025-02-03 DIAGNOSIS — N18.32 STAGE 3B CHRONIC KIDNEY DISEASE: Primary | ICD-10-CM

## 2025-02-03 DIAGNOSIS — R60.0 BILATERAL LEG EDEMA: ICD-10-CM

## 2025-02-03 PROCEDURE — 99214 OFFICE O/P EST MOD 30 MIN: CPT | Mod: S$PBB,,, | Performed by: NURSE PRACTITIONER

## 2025-02-03 PROCEDURE — 99214 OFFICE O/P EST MOD 30 MIN: CPT | Mod: PBBFAC | Performed by: NURSE PRACTITIONER

## 2025-02-03 RX ORDER — AMLODIPINE BESYLATE 5 MG/1
5 TABLET ORAL DAILY
Qty: 90 TABLET | Refills: 3 | Status: SHIPPED | OUTPATIENT
Start: 2025-02-03 | End: 2026-01-29

## 2025-02-03 RX ORDER — LOSARTAN POTASSIUM 100 MG/1
100 TABLET ORAL DAILY
Qty: 90 TABLET | Refills: 3 | Status: SHIPPED | OUTPATIENT
Start: 2025-02-03 | End: 2026-02-03

## 2025-02-03 RX ORDER — FUROSEMIDE 20 MG/1
20 TABLET ORAL DAILY PRN
Qty: 90 TABLET | Refills: 1 | Status: SHIPPED | OUTPATIENT
Start: 2025-02-03 | End: 2025-08-02

## 2025-02-03 RX ORDER — ROSUVASTATIN CALCIUM 40 MG/1
40 TABLET, COATED ORAL NIGHTLY
Qty: 90 TABLET | Refills: 3 | Status: SHIPPED | OUTPATIENT
Start: 2025-02-03 | End: 2026-02-03

## 2025-02-03 NOTE — PROGRESS NOTES
"[facility]  Nephrology Clinic Note    Chief complaint: Chronic Kidney Disease (Follow up)    History of present illness:   Marisel Resendez is a 74 y.o. White female with past medical history of chronic kidney disease stage III, solitary kidney (patient donated a kidney to her relative in 2003), hypertension, dyslipidemia, anxiety, COPD, and DARRON (not on NIPPV). She presents for follow-up appointment in nephrology clinic.     Review of Systems  12 point review of systems conducted, negative except as stated in the history of present illness.    Allergies: Patient has No Known Allergies.     Past Medical History:  has a past medical history of Anxiety disorder, unspecified, Atypical chest pain, Bilateral leg edema, CKD (chronic kidney disease), COPD (chronic obstructive pulmonary disease), Dyslipidemia, History of radical nephrectomy, HTN (hypertension), and DARRON (obstructive sleep apnea).    Procedure History:  has a past surgical history that includes Cataract extraction w/ intraocular lens implant (Right, 08/05/2021); Cataract extraction w/ intraocular lens implant (Left, 07/13/2021); Hysterectomy (1997); bilateral oophrectomy (1990); Robot-assisted laparoscopic donor nephrectomy (Right, 2002); Laparoscopic cholecystectomy (N/A, 08/04/2023); Eye surgery (08/2021); and Cholecystectomy.    Family History: family history includes Cancer in her mother; Heart attack in her father; Heart failure in her father.    Social History:  reports that she quit smoking about 8 years ago. Her smoking use included cigarettes. She has been exposed to tobacco smoke. She has never used smokeless tobacco. She reports that she does not drink alcohol and does not use drugs.    Physical exam  /74 (BP Location: Left arm, Patient Position: Sitting)   Pulse 78   Temp 98 °F (36.7 °C) (Oral)   Resp 20   Ht 5' 2" (1.575 m)   Wt 100.9 kg (222 lb 6.4 oz)   SpO2 95%   BMI 40.68 kg/m²   General appearance: " Patient is in no acute distress.  Skin: No rashes or wounds.  HEENT: PERRLA, EOMI, no scleral icterus, no JVD. Neck is supple.  Chest: Respirations are unlabored. Lungs sounds are clear.   Heart: S1, S2.   Abdomen: Benign.  : Deferred.  Extremities: No edema, peripheral pulses are palpable.   Neuro: No focal deficits.     Home Medications:    Current Outpatient Medications:     albuterol (PROVENTIL/VENTOLIN HFA) 90 mcg/actuation inhaler, Inhale 1 puff into the lungs every 4 (four) hours as needed for Wheezing or Shortness of Breath., Disp: 6.7 g, Rfl: 6    albuterol-ipratropium (DUO-NEB) 2.5 mg-0.5 mg/3 mL nebulizer solution, Take 3 mLs by nebulization 4 (four) times daily as needed for Shortness of Breath or Wheezing., Disp: 75 mL, Rfl: 6    benzonatate (TESSALON) 100 MG capsule, Take 1 capsule (100 mg total) by mouth 3 (three) times daily as needed for Cough., Disp: 30 capsule, Rfl: 1    calcium carbonate (OS-GINGER) 500 mg calcium (1,250 mg) tablet, Take 1 tablet by mouth 2 (two) times daily., Disp: , Rfl:     cetirizine (ZYRTEC) 10 MG tablet, Take 10 mg by mouth Daily., Disp: , Rfl:     diclofenac sodium (VOLTAREN ARTHRITIS PAIN) 1 % Gel, Apply 2 g topically 4 (four) times daily. Do not exceed 32 grams/day: do not to exceed 8 grams/day/single joint of upper extremities; do not to exceed 16 grams/day/single joint of lower extremities.  Please request refill of this medication from your PCP., Disp: 100 g, Rfl: 3    fluticasone-umeclidin-vilanter (TRELEGY ELLIPTA) 100-62.5-25 mcg DsDv, Inhale 1 puff into the lungs once daily., Disp: 60 each, Rfl: 6    omeprazole (PRILOSEC) 20 MG capsule, Take 1 capsule (20 mg total) by mouth once daily., Disp: 90 capsule, Rfl: 1    triamcinolone acetonide 0.1% (KENALOG) 0.1 % cream, Apply topically 2 (two) times daily as needed (rash/itching)., Disp: 80 g, Rfl: 0    amLODIPine (NORVASC) 5 MG tablet, Take 1 tablet (5 mg total) by mouth once daily., Disp: 90 tablet, Rfl: 3     furosemide (LASIX) 20 MG tablet, Take 1 tablet (20 mg total) by mouth daily as needed (swelling)., Disp: 90 tablet, Rfl: 1    losartan (COZAAR) 100 MG tablet, Take 1 tablet (100 mg total) by mouth once daily., Disp: 90 tablet, Rfl: 3    rosuvastatin (CRESTOR) 40 MG Tab, Take 1 tablet (40 mg total) by mouth every evening., Disp: 90 tablet, Rfl: 3    Laboratory data    Lab Results   Component Value Date    WBC 7.51 07/26/2024    HGB 14.6 07/26/2024    HCT 43.9 07/26/2024     07/26/2024    IRON 84 12/01/2017    TIBC 343 12/01/2017    LABIRON 24.5 12/01/2017    FERRITIN 68.3 12/01/2017     01/31/2025    K 4.6 01/31/2025    CO2 26 01/31/2025    BUN 22.1 (H) 01/31/2025    CREATININE 1.25 (H) 01/31/2025    EGFRNORACEVR 46 01/31/2025    GLUCOSE 93 01/31/2025    CALCIUM 9.6 01/31/2025    ALKPHOS 111 01/31/2025    LABPROT 7.3 01/31/2025    ALBUMIN 3.9 01/31/2025    BILIDIR 0.2 01/11/2019    IBILI 0.3 01/11/2019    AST 25 01/31/2025    ALT 18 01/31/2025    MG 2.20 01/04/2024    PHOS 3.4 01/04/2024      Lab Results   Component Value Date    HGBA1C 5.7 11/19/2024    PTH 54.7 11/08/2018    NVQOQDFR07JI 40.08 11/08/2018     Urine:  Lab Results   Component Value Date    APPEARANCEUA Clear 01/31/2025    SGUA 1.006 01/31/2025    PROTEINUA Negative 01/31/2025    KETONESUA Negative 01/31/2025    LEUKOCYTESUR Negative 01/31/2025    RBCUA None Seen 01/31/2025    WBCUA 0-5 01/31/2025    BACTERIA None Seen 01/31/2025    SQEPUA Trace (A) 01/31/2025    HYALINECASTS None Seen 01/31/2025    CREATRANDUR 23.2 (L) 01/31/2025    PROTEINURINE <6.8 01/31/2025    UPROTCREA 0.1 07/26/2024         Imaging  (06/10/2015 08:58 CDT US Retroperitoneum Limited)  Clinical indication: Patient donated right kidney, solitary kidney.  Imaging of the retroperitoneal areas were obtained, with the right  kidney noted be surgically absent with no abnormality seen in the  right renal fossa. The left kidney is normal in appearance measuring  11.5 x 6.0 x  5.2 cm. There is no hydronephrosis, calculi, or evidence  of a solid mass. No perirenal fluid collections are demonstrated.  Renal cortex demonstrates normal echogenicity. The bladder was not  imaged.  Impression: Status post right nephrectomy with normal appearance the  left kidney.      Impression    ICD-10-CM ICD-9-CM   1. Stage 3b chronic kidney disease  N18.32 585.3   2. Primary hypertension  I10 401.9   3. Bilateral leg edema  R60.0 782.3   4. Mixed hyperlipidemia  E78.2 272.2   5. Severe obesity (BMI >= 40)  E66.01 278.01        Plan  Stage 3b chronic kidney disease  -     Comprehensive Metabolic Panel; Future; Expected date: 07/20/2025  -     Protein/Creatinine Ratio, Urine; Future; Expected date: 07/20/2025  -     PTH, Intact; Future; Expected date: 07/20/2025  -     Urinalysis, Reflex to Urine Culture; Future; Expected date: 07/20/2025  Solitary kidney.  Renal function has been is stable, there is no significant proteinuria.  Continue risk factor management and periodic monitoring.  Continue:  -follow 2 g a day dietary sodium restriction  -control high blood pressure (goal blood pressure is less than 130/80, please check blood pressure twice a week and bring blood pressure logs to office visit)  -exercise at least 30 minutes a day, 5 days a week  -maintain healthy weight  -stay well hydrated (drink water only, avoid juices, sweet tea, and sodas)  -ask about staying up-to-date on vaccinations (flu vaccine, pneumonia vaccine, hepatitis B vaccine)  -avoid excessive use of NSAIDs (ibuprofen, naproxen, Aleve, Advil, Toradol, Mobic), take Tylenol as needed for headache or mild pain  -take cholesterol lowering medications if prescribed (LDL goal less than 100)    Primary hypertension  -     losartan (COZAAR) 100 MG tablet; Take 1 tablet (100 mg total) by mouth once daily.  Dispense: 90 tablet; Refill: 3  -     amLODIPine (NORVASC) 5 MG tablet; Take 1 tablet (5 mg total) by mouth once daily.  Dispense: 90 tablet;  Refill: 3  Hypertension is well controlled, continue current antihypertensive regimen and dietary sodium restriction.      Bilateral leg edema  -     furosemide (LASIX) 20 MG tablet; Take 1 tablet (20 mg total) by mouth daily as needed (swelling).  Dispense: 90 tablet; Refill: 1  Continue furosemide on an as needed basis.      Mixed hyperlipidemia  -     rosuvastatin (CRESTOR) 40 MG Tab; Take 1 tablet (40 mg total) by mouth every evening.  Dispense: 90 tablet; Refill: 3  Continue statin therapy.    Severe obesity (BMI >= 40)  Lifestyle and dietary interventions discussed, patient encouraged to maintain non-sedentary lifestyle and well-balanced diet.       Follow up in about 6 months (around 8/3/2025).         Isabel Riojas NP  [facility] Nephrology

## 2025-02-06 ENCOUNTER — HOSPITAL ENCOUNTER (OUTPATIENT)
Dept: RADIOLOGY | Facility: HOSPITAL | Age: 75
Discharge: HOME | End: 2025-02-06

## 2025-02-06 ENCOUNTER — OFFICE VISIT (OUTPATIENT)
Dept: URGENT CARE | Facility: CLINIC | Age: 75
End: 2025-02-06

## 2025-02-06 VITALS
BODY MASS INDEX: 41.11 KG/M2 | DIASTOLIC BLOOD PRESSURE: 83 MMHG | WEIGHT: 223.38 LBS | SYSTOLIC BLOOD PRESSURE: 131 MMHG | HEART RATE: 87 BPM | TEMPERATURE: 98 F | RESPIRATION RATE: 18 BRPM | OXYGEN SATURATION: 100 % | HEIGHT: 62 IN

## 2025-02-06 DIAGNOSIS — M25.511 ACUTE PAIN OF RIGHT SHOULDER: Primary | ICD-10-CM

## 2025-02-06 DIAGNOSIS — M25.511 ACUTE PAIN OF RIGHT SHOULDER: ICD-10-CM

## 2025-02-06 PROCEDURE — 99214 OFFICE O/P EST MOD 30 MIN: CPT | Mod: PBBFAC,25

## 2025-02-06 PROCEDURE — 73030 X-RAY EXAM OF SHOULDER: CPT | Mod: TC,RT

## 2025-02-06 PROCEDURE — 99213 OFFICE O/P EST LOW 20 MIN: CPT | Mod: S$PBB,,,

## 2025-02-06 RX ORDER — CYCLOBENZAPRINE HCL 5 MG
5 TABLET ORAL NIGHTLY
Qty: 10 TABLET | Refills: 0 | Status: SHIPPED | OUTPATIENT
Start: 2025-02-06

## 2025-02-06 NOTE — PROGRESS NOTES
"Subjective:       Patient ID: Marisel Resendez is a 74 y.o. female.    Vitals:  height is 5' 2" (1.575 m) and weight is 101.3 kg (223 lb 6.4 oz). Her temperature is 98.2 °F (36.8 °C). Her blood pressure is 131/83 and her pulse is 87. Her respiration is 18 and oxygen saturation is 100%.     Chief Complaint: Shoulder Pain (Right shoulder pain x 1 month/Patient states she has an appt next month )    73 y/o white female c/o right shoulder pain and clicking x 1 month, she denies injury or fall , reports symptoms are worsening during night hours. Has applied Voltaren with mild improvement.         Musculoskeletal:  Positive for pain, joint pain and muscle ache. Negative for trauma and joint swelling.   Neurological:  Negative for numbness and tingling.       Objective:      Physical Exam   Constitutional: She is cooperative. She is easily aroused.  Non-toxic appearance. She does not appear ill. overweightawake  HENT:   Head: Normocephalic and atraumatic.   Musculoskeletal:      Right shoulder: She exhibits decreased range of motion, tenderness and crepitus. She exhibits no bony tenderness and no swelling.      Comments: Discomfort with backwards reaching   Neurological: She is alert and easily aroused.   Skin: Skin is not diaphoretic.   Nursing note and vitals reviewed.        Assessment:       1. Acute pain of right shoulder          Plan:     Xr images reviewed, no acute fractures appreciated, if radiologist detects any concerning findings staff will promptly notify patient.  Encouraged patient to keep upcoming appointment with Orthopedic, we will give course of Flexeril nightly, sedative affects discussed.    Acute pain of right shoulder  -     XR SHOULDER COMPLETE 2 OR MORE VIEWS RIGHT; Future; Expected date: 02/06/2025  -     cyclobenzaprine (FLEXERIL) 5 MG tablet; Take 1 tablet (5 mg total) by mouth nightly.  Dispense: 10 tablet; Refill: 0              Medical Decision Making:   Differential Diagnosis:   OA, " shoulder impingement, labral tears, among others.

## 2025-05-12 ENCOUNTER — OFFICE VISIT (OUTPATIENT)
Dept: OPHTHALMOLOGY | Facility: CLINIC | Age: 75
End: 2025-05-12

## 2025-05-12 VITALS — WEIGHT: 222.69 LBS | HEIGHT: 62 IN | BODY MASS INDEX: 40.98 KG/M2

## 2025-05-12 DIAGNOSIS — H18.513 CORNEAL GUTTATA OF BOTH EYES: ICD-10-CM

## 2025-05-12 DIAGNOSIS — H35.3131 EARLY DRY STAGE NONEXUDATIVE AGE-RELATED MACULAR DEGENERATION OF BOTH EYES: Primary | ICD-10-CM

## 2025-05-12 DIAGNOSIS — Z96.1 PSEUDOPHAKIA OF BOTH EYES: ICD-10-CM

## 2025-05-12 PROCEDURE — 92134 CPTRZ OPH DX IMG PST SGM RTA: CPT | Mod: PBBFAC,PN | Performed by: OPHTHALMOLOGY

## 2025-05-12 PROCEDURE — 92134 CPTRZ OPH DX IMG PST SGM RTA: CPT | Mod: PBBFAC,PN

## 2025-05-12 PROCEDURE — 99213 OFFICE O/P EST LOW 20 MIN: CPT | Mod: PBBFAC,PN

## 2025-05-12 NOTE — PROGRESS NOTES
HPI    RTC 12 Months Annual DFE//OCT Mac  MRX done today.   Last edited by Maylin Lopez on 5/12/2025 11:26 AM.        Assessment /Plan     For exam results, see Encounter Report.    Early dry stage nonexudative age-related macular degeneration of both eyes    Corneal guttata of both eyes    Pseudophakia of both eyes        OCTmac  11/9/22  OD: 295; mild subfoveal drusen; no edema; FC intact  OS: 296; trace subfoveal drusen; no edema; FC intact    12/22/23  OD: 276; mild subfoveal drusen; no edema; FC intact  OS: 288; trace subfoveal drusen; no edema; FC intact    5/12/25  CMT: 288, NFC, mild subfoveal drusen, slightly more than prior, no edema  CMT: 291, NFC, tr subfoveal drusen, stable from prior, no edema    1. PCIOL OD  - Performed 8/5/21. Doing well  - MRX 9/2021  - Can follow up PRN.    2. PCIOL OS  - Performed 7/13/21.  - No visual complaints    3. ANDRES OU  - patient reports blurry vision with watching tv  - 12/22/23 vision fluctuates on refraction, BCVA 20/25 // 20/40; OCT mac flat, suspect dry eyes as the main cause of blurry vision; will start artificial tears QID, WC BID, devante qhs OU  - 5/12/25: patient never started artificial tears, discussed again as patient having intermittent blurry vision, handout given     4. Guttae OU  - monitor, asymptomatic at this time  - Annual exam    5. Early-Intermediate Dry AMD, OU  - OCT with mild drusen, correlates with exam; no edema  - CTM annual DFE and OCTm  - previous smoker; quit years ago.   - using Amsler grid, no changes  - 5/12/25: slightly worse drusens on OCTm, start AREDS2 BID    RTC in 12 months for annual DFE, OCTmac

## 2025-05-13 PROBLEM — H35.3131 EARLY DRY STAGE NONEXUDATIVE AGE-RELATED MACULAR DEGENERATION OF BOTH EYES: Status: ACTIVE | Noted: 2025-05-13

## 2025-05-13 PROBLEM — H18.513 CORNEAL GUTTATA OF BOTH EYES: Status: ACTIVE | Noted: 2025-05-13

## 2025-05-19 ENCOUNTER — HOSPITAL ENCOUNTER (OUTPATIENT)
Dept: RADIOLOGY | Facility: HOSPITAL | Age: 75
Discharge: HOME | End: 2025-05-19

## 2025-05-19 ENCOUNTER — OFFICE VISIT (OUTPATIENT)
Dept: URGENT CARE | Facility: CLINIC | Age: 75
End: 2025-05-19

## 2025-05-19 VITALS
RESPIRATION RATE: 20 BRPM | SYSTOLIC BLOOD PRESSURE: 136 MMHG | DIASTOLIC BLOOD PRESSURE: 82 MMHG | TEMPERATURE: 98 F | OXYGEN SATURATION: 95 % | BODY MASS INDEX: 41.22 KG/M2 | HEIGHT: 62 IN | HEART RATE: 77 BPM | WEIGHT: 224 LBS

## 2025-05-19 DIAGNOSIS — J44.1 COPD EXACERBATION: Primary | ICD-10-CM

## 2025-05-19 DIAGNOSIS — R05.1 ACUTE COUGH: ICD-10-CM

## 2025-05-19 PROCEDURE — 71046 X-RAY EXAM CHEST 2 VIEWS: CPT | Mod: TC

## 2025-05-19 PROCEDURE — 99215 OFFICE O/P EST HI 40 MIN: CPT | Mod: PBBFAC,25

## 2025-05-19 PROCEDURE — 99214 OFFICE O/P EST MOD 30 MIN: CPT | Mod: S$PBB,,,

## 2025-05-19 RX ORDER — BENZONATATE 100 MG/1
100 CAPSULE ORAL 3 TIMES DAILY PRN
Qty: 30 CAPSULE | Refills: 0 | Status: SHIPPED | OUTPATIENT
Start: 2025-05-19 | End: 2025-05-29

## 2025-05-19 RX ORDER — PREDNISONE 10 MG/1
TABLET ORAL
Qty: 18 TABLET | Refills: 0 | Status: SHIPPED | OUTPATIENT
Start: 2025-05-19 | End: 2025-05-26

## 2025-05-19 RX ORDER — AZITHROMYCIN 250 MG/1
TABLET, FILM COATED ORAL
Qty: 6 TABLET | Refills: 0 | Status: SHIPPED | OUTPATIENT
Start: 2025-05-19 | End: 2025-05-24

## 2025-05-19 NOTE — PROGRESS NOTES
"Subjective:       Patient ID: Marisel Resendez is a 74 y.o. female.    Vitals:  height is 5' 2" (1.575 m) and weight is 101.6 kg (224 lb). Her temperature is 97.9 °F (36.6 °C). Her blood pressure is 136/82 and her pulse is 77. Her respiration is 20 and oxygen saturation is 95%.     Chief Complaint: Shortness of Breath (Pt c/o SOB ., COPD exacerbation x 2-3 days  )    75 y/o white female presents to  alone, she is c/o symptoms x 3 days which has not improved, reports compliant with PRN nocturnal O2. She denies any known ill exposures.         Constitution: Negative for chills and fever.   HENT:  Negative for sore throat.    Cardiovascular:  Negative for chest pain, palpitations and sob on exertion.   Respiratory:  Positive for chest tightness, cough, COPD and shortness of breath. Negative for sputum production, wheezing and asthma.    Allergic/Immunologic: Negative for asthma.       Objective:      Physical Exam   Constitutional: She is oriented to person, place, and time. She is cooperative. She is easily aroused.  Non-toxic appearance. She does not appear ill. obesityawake  HENT:   Head: Normocephalic and atraumatic.   Mouth/Throat: Mucous membranes are moist.   Cardiovascular: Normal rate and normal heart sounds.   Pulmonary/Chest: Effort normal. No respiratory distress. She has rales. She exhibits no tenderness.   Neurological: She is alert, oriented to person, place, and time and easily aroused. Gait normal. GCS eye subscore is 4. GCS verbal subscore is 5. GCS motor subscore is 6.   Skin: Skin is warm, dry, intact and not diaphoretic. Capillary refill takes less than 2 seconds.   Psychiatric: Her speech is normal. Mood and thought content normal.   Nursing note and vitals reviewed.        Assessment:       1. COPD exacerbation    2. Acute cough          Plan:     Declines COVID screening, risks discussed, patient verbalizes understanding. No acute findings noted on CXR, will treat as COPD exacerbation, " encouraged to continue with bronchodilators, we will add empirical azithromycin with tapering prednisone.  When to seek ER attention discussed.      COPD exacerbation  -     azithromycin (Z-GRISELDA) 250 MG tablet; Take 2 tablets by mouth on day 1; Take 1 tablet by mouth on days 2-5  Dispense: 6 tablet; Refill: 0  -     predniSONE (DELTASONE) 10 MG tablet; Take 2 tablets (20 mg total) by mouth 2 (two) times daily for 3 days, THEN 2 tablets (20 mg total) once daily for 2 days, THEN 1 tablet (10 mg total) once daily for 2 days.  Dispense: 18 tablet; Refill: 0    Acute cough  -     XR CHEST PA AND LATERAL; Future; Expected date: 05/19/2025  -     benzonatate (TESSALON) 100 MG capsule; Take 1 capsule (100 mg total) by mouth 3 (three) times daily as needed for Cough (cough).  Dispense: 30 capsule; Refill: 0    XR CHEST PA AND LATERAL  Status: Final result     [facility] Results Release    [facility] Status: Active  Results Release     PACS Images for [employer] Viewer     Show images for XR CHEST PA AND LATERAL  XR CHEST PA AND LATERAL  Order: [id number]   Status: Final result       Next appt: 06/11/2025 at 09:40 AM in Family Medicine ([facility])       Dx: Acute cough    Test Result Released: Yes (not seen)    0 Result Notes  Details    Reading Physician Reading Date Result Priority   Clem Martin MD  [phone]  5/19/2025 STAT     Narrative & Impression  EXAMINATION:  XR CHEST PA AND LATERAL     CLINICAL HISTORY:  Acute cough     TECHNIQUE:  Two-view     COMPARISON:  January 2, 2024.     FINDINGS:  Cardiopericardial silhouette is within normal limits.  Chronic appearing interstitial changes of the lungs without convincing acute dense focal or segmental consolidation, congestion, pleural effusion or pneumothorax.     Impression:     No acute cardiopulmonary process identified.        Electronically signed by:Clem Martin  Date:                                            05/19/2025  Time:                                            09:57        Exam Ended: 05/19/25 09:40 CDT Last Resulted: 05/19/25 09:57 CDT                         Additional MDM:     Heart Failure Score:   COPD = Yes

## 2025-06-11 ENCOUNTER — OFFICE VISIT (OUTPATIENT)
Dept: FAMILY MEDICINE | Facility: CLINIC | Age: 75
End: 2025-06-11

## 2025-06-11 VITALS
BODY MASS INDEX: 41.04 KG/M2 | HEIGHT: 62 IN | HEART RATE: 77 BPM | RESPIRATION RATE: 18 BRPM | TEMPERATURE: 98 F | DIASTOLIC BLOOD PRESSURE: 77 MMHG | WEIGHT: 223 LBS | OXYGEN SATURATION: 99 % | SYSTOLIC BLOOD PRESSURE: 119 MMHG

## 2025-06-11 DIAGNOSIS — N18.32 STAGE 3B CHRONIC KIDNEY DISEASE: ICD-10-CM

## 2025-06-11 DIAGNOSIS — J45.909 ASTHMA, UNSPECIFIED ASTHMA SEVERITY, UNSPECIFIED WHETHER COMPLICATED, UNSPECIFIED WHETHER PERSISTENT: ICD-10-CM

## 2025-06-11 DIAGNOSIS — I10 PRIMARY HYPERTENSION: ICD-10-CM

## 2025-06-11 DIAGNOSIS — E55.9 VITAMIN D DEFICIENCY: ICD-10-CM

## 2025-06-11 DIAGNOSIS — K21.9 GASTROESOPHAGEAL REFLUX DISEASE WITHOUT ESOPHAGITIS: ICD-10-CM

## 2025-06-11 DIAGNOSIS — R73.03 PREDIABETES: ICD-10-CM

## 2025-06-11 DIAGNOSIS — L20.9 ATOPIC DERMATITIS, UNSPECIFIED TYPE: ICD-10-CM

## 2025-06-11 DIAGNOSIS — G47.33 OBSTRUCTIVE SLEEP APNEA SYNDROME: ICD-10-CM

## 2025-06-11 DIAGNOSIS — E78.2 MIXED HYPERLIPIDEMIA: ICD-10-CM

## 2025-06-11 DIAGNOSIS — Z12.11 SCREENING FOR COLORECTAL CANCER: ICD-10-CM

## 2025-06-11 DIAGNOSIS — E66.01 SEVERE OBESITY (BMI 35.0-39.9) WITH COMORBIDITY: ICD-10-CM

## 2025-06-11 DIAGNOSIS — Z12.11 COLON CANCER SCREENING: ICD-10-CM

## 2025-06-11 DIAGNOSIS — M65.4 DE QUERVAIN'S TENOSYNOVITIS: ICD-10-CM

## 2025-06-11 DIAGNOSIS — R73.02 IMPAIRED GLUCOSE TOLERANCE: ICD-10-CM

## 2025-06-11 DIAGNOSIS — L40.9 PSORIASIS: Primary | ICD-10-CM

## 2025-06-11 DIAGNOSIS — M17.12 OSTEOARTHRITIS OF LEFT KNEE, UNSPECIFIED OSTEOARTHRITIS TYPE: ICD-10-CM

## 2025-06-11 DIAGNOSIS — Z12.12 SCREENING FOR COLORECTAL CANCER: ICD-10-CM

## 2025-06-11 DIAGNOSIS — J44.9 CHRONIC OBSTRUCTIVE PULMONARY DISEASE, UNSPECIFIED COPD TYPE: ICD-10-CM

## 2025-06-11 LAB
ALBUMIN SERPL-MCNC: 4 G/DL (ref 3.4–4.8)
ALBUMIN/GLOB SERPL: 1.1 RATIO (ref 1.1–2)
ALP SERPL-CCNC: 121 UNIT/L (ref 40–150)
ALT SERPL-CCNC: 19 UNIT/L (ref 0–55)
ANION GAP SERPL CALC-SCNC: 8 MEQ/L
AST SERPL-CCNC: 25 UNIT/L (ref 11–45)
BASOPHILS # BLD AUTO: 0.06 X10(3)/MCL
BASOPHILS NFR BLD AUTO: 0.9 %
BILIRUB SERPL-MCNC: 0.6 MG/DL
BUN SERPL-MCNC: 19.5 MG/DL (ref 9.8–20.1)
CALCIUM SERPL-MCNC: 9.4 MG/DL (ref 8.4–10.2)
CHLORIDE SERPL-SCNC: 107 MMOL/L (ref 98–107)
CO2 SERPL-SCNC: 26 MMOL/L (ref 23–31)
CREAT SERPL-MCNC: 1.31 MG/DL (ref 0.55–1.02)
CREAT/UREA NIT SERPL: 15
EOSINOPHIL # BLD AUTO: 0.29 X10(3)/MCL (ref 0–0.9)
EOSINOPHIL NFR BLD AUTO: 4.2 %
ERYTHROCYTE [DISTWIDTH] IN BLOOD BY AUTOMATED COUNT: 11.8 % (ref 11.5–17)
EST. AVERAGE GLUCOSE BLD GHB EST-MCNC: 125.5 MG/DL
GFR SERPLBLD CREATININE-BSD FMLA CKD-EPI: 43 ML/MIN/1.73/M2
GLOBULIN SER-MCNC: 3.5 GM/DL (ref 2.4–3.5)
GLUCOSE SERPL-MCNC: 98 MG/DL (ref 82–115)
HBA1C MFR BLD: 6 %
HCT VFR BLD AUTO: 44.1 % (ref 37–47)
HGB BLD-MCNC: 14.4 G/DL (ref 12–16)
IMM GRANULOCYTES # BLD AUTO: 0.01 X10(3)/MCL (ref 0–0.04)
IMM GRANULOCYTES NFR BLD AUTO: 0.1 %
LYMPHOCYTES # BLD AUTO: 1.4 X10(3)/MCL (ref 0.6–4.6)
LYMPHOCYTES NFR BLD AUTO: 20.3 %
MCH RBC QN AUTO: 33.4 PG (ref 27–31)
MCHC RBC AUTO-ENTMCNC: 32.7 G/DL (ref 33–36)
MCV RBC AUTO: 102.3 FL (ref 80–94)
MONOCYTES # BLD AUTO: 0.62 X10(3)/MCL (ref 0.1–1.3)
MONOCYTES NFR BLD AUTO: 9 %
NEUTROPHILS # BLD AUTO: 4.53 X10(3)/MCL (ref 2.1–9.2)
NEUTROPHILS NFR BLD AUTO: 65.5 %
NRBC BLD AUTO-RTO: 0 %
PLATELET # BLD AUTO: 220 X10(3)/MCL (ref 130–400)
PMV BLD AUTO: 10.2 FL (ref 7.4–10.4)
POTASSIUM SERPL-SCNC: 4.8 MMOL/L (ref 3.5–5.1)
PROT SERPL-MCNC: 7.5 GM/DL (ref 5.8–7.6)
RBC # BLD AUTO: 4.31 X10(6)/MCL (ref 4.2–5.4)
SODIUM SERPL-SCNC: 141 MMOL/L (ref 136–145)
WBC # BLD AUTO: 6.91 X10(3)/MCL (ref 4.5–11.5)

## 2025-06-11 PROCEDURE — 85025 COMPLETE CBC W/AUTO DIFF WBC: CPT | Performed by: NURSE PRACTITIONER

## 2025-06-11 PROCEDURE — 99214 OFFICE O/P EST MOD 30 MIN: CPT | Mod: S$PBB,,, | Performed by: NURSE PRACTITIONER

## 2025-06-11 PROCEDURE — 80053 COMPREHEN METABOLIC PANEL: CPT | Performed by: NURSE PRACTITIONER

## 2025-06-11 PROCEDURE — 99214 OFFICE O/P EST MOD 30 MIN: CPT | Mod: PBBFAC,PN | Performed by: NURSE PRACTITIONER

## 2025-06-11 PROCEDURE — 83036 HEMOGLOBIN GLYCOSYLATED A1C: CPT | Performed by: NURSE PRACTITIONER

## 2025-06-11 RX ORDER — PREDNISONE 20 MG/1
TABLET ORAL
Qty: 10 TABLET | Refills: 11 | Status: SHIPPED | OUTPATIENT
Start: 2025-06-11

## 2025-06-11 RX ORDER — DOXYCYCLINE 100 MG/1
100 CAPSULE ORAL EVERY 12 HOURS
Qty: 14 CAPSULE | Refills: 11 | Status: SHIPPED | OUTPATIENT
Start: 2025-06-11

## 2025-06-11 RX ORDER — ALBUTEROL SULFATE 90 UG/1
1 INHALANT RESPIRATORY (INHALATION) EVERY 4 HOURS PRN
Qty: 6.7 G | Refills: 11 | Status: SHIPPED | OUTPATIENT
Start: 2025-06-11

## 2025-06-11 NOTE — PROGRESS NOTES
[facility]      Patient Name: Marisel Resendez     : 1950    MRN: [id number]     Subjective:     Patient ID: Marisel Resendez is a 74 y.o. female.    Chief Complaint:   Chief Complaint   Patient presents with    Follow-up     Pt is here for follow up. Pt requesting PAP assistance form. Pt requesting albuterol inhaler, duo-neb, and omeprazole refilled today. Pt willing to complete fit        HPI: 25:  6 month FU COPD, anxiety, leg edema, CKD, HTN, DARRON, psoriasis, asthma, eczema.  Has had urgent care visit since I saw her last visit with me.  She is due for a couple of labs that have not been done recently.  She states Trelegy is working for her.  She is concerned about her weight but we did discuss this further today on tips and tricks to help her lose weight. Patient denies any other issues.  She has FU with Nephrology.        24:  Pt is here to establish care with PCP. Pt needs PAP form completed for Trelegy. Patient states for the most part the only thing that bothers her from time to time is her COPD. The Trelegy helps and she has been out and she also has home oxygen that she uses periodically but does not want to be dependent on that.  Hx eczema and psoriasis.  Also hx asthma, COPD.  She has had her heart evaluated in the past with echo.  States at times when she walks her legs will get tired. States that she has never had angiogram on her legs.  Has not had CRC for  yet.  Has been doing FIT testing in the past.  All other metrics are met. She does not do PAP smears any longer.          Anxiety disorder, unspecified-  Chronic, stable issue     Atypical chest pain-  This has kind of resolved.      Bilateral leg edema-  This is still present but is not as bad. She has prn Lasix as needed for this issue. She sees Nephrology for HTN and CKD.      CKD (chronic kidney disease)/History of radical nephrectomy-  Established with Nephrology.      COPD (chronic  obstructive pulmonary disease)-  Uses Trelegy and has home oxygen.      Dyslipidemia-  Last lipid panel was WNL.  She is on Lipitor.      HTN (hypertension)-  States that her BP is all over the place. She ranges from systolic 120's-150's.  Diastolic is always normal.      DARRON (obstructive sleep apnea)-  Chronic, stable issue     Psoriasis-  Chronic, stable issue. Has creams to treat this     Asthma-  Is on Trelegy.      Eczema-  Triamcinolone as needed.                   ROS:      Review of Systems   Constitutional: Negative.    HENT: Negative.     Eyes: Negative.    Respiratory: Negative.     Cardiovascular: Negative.    Gastrointestinal: Negative.    Genitourinary: Negative.    Musculoskeletal: Negative.    Skin: Negative.    Neurological: Negative.    Endo/Heme/Allergies: Negative.    Psychiatric/Behavioral: Negative.     All other systems reviewed and are negative.      12 point review of systems conducted, negative except as stated in the history of present illness. See HPI for details.    History:     Health Maintenance         Date Due Completion Date    Colorectal Cancer Screening 05/03/2024 5/3/2023    Annual UACr 07/26/2025 7/26/2024    DEXA Scan 11/07/2025 11/7/2022    Hemoglobin A1c (Prediabetes) 11/19/2025 11/19/2024    Mammogram 12/01/2025 12/1/2024    TETANUS VACCINE 01/05/2027 1/5/2017    Lipid Panel 11/19/2029 11/19/2024            Past Medical History:   Diagnosis Date    Anxiety disorder, unspecified     Atypical chest pain 05/03/2023    Bilateral leg edema 05/03/2023    CKD (chronic kidney disease)     COPD (chronic obstructive pulmonary disease)     Dyslipidemia     History of radical nephrectomy     HTN (hypertension)     DARRON (obstructive sleep apnea)         Past Surgical History:   Procedure Laterality Date    bilateral oophrectomy  1990    CATARACT EXTRACTION W/ INTRAOCULAR LENS IMPLANT Right 08/05/2021    CATARACT EXTRACTION W/ INTRAOCULAR LENS IMPLANT Left 07/13/2021    CHOLECYSTECTOMY       EYE SURGERY  2021    cataract    HYSTERECTOMY      LAPAROSCOPIC CHOLECYSTECTOMY N/A 2023    Procedure: CHOLECYSTECTOMY, LAPAROSCOPIC;  Surgeon: Anton Marie Jr., MD;  Location: [facility];  Service: General;  Laterality: N/A;    ROBOT-ASSISTED LAPAROSCOPIC DONOR NEPHRECTOMY Right        Family History   Problem Relation Name Age of Onset    Cancer Mother Marisel Resendez     Heart failure Father      Heart attack Father          Social History     Tobacco Use    Smoking status: Former     Current packs/day: 0.00     Types: Cigarettes     Quit date: 2017     Years since quittin.4     Passive exposure: Current    Smokeless tobacco: Never   Substance and Sexual Activity    Alcohol use: Never    Drug use: Never    Sexual activity: Not Currently     Partners: Male     Birth control/protection: Abstinence       Current Outpatient Medications   Medication Instructions    albuterol (PROVENTIL/VENTOLIN HFA) 90 mcg/actuation inhaler 1 puff, Inhalation, Every 4 hours PRN    albuterol-ipratropium (DUO-NEB) 2.5 mg-0.5 mg/3 mL nebulizer solution 3 mLs, Nebulization, 4 times daily PRN    amLODIPine (NORVASC) 5 mg, Oral, Daily    benzonatate (TESSALON) 100 mg, Oral, 3 times daily PRN    calcium carbonate (OS-GINGER) 500 mg calcium (1,250 mg) tablet 1 tablet, 2 times daily    cetirizine (ZYRTEC) 10 mg, Daily    diclofenac sodium (VOLTAREN ARTHRITIS PAIN) 2 g, Topical (Top), 4 times daily, Do not exceed 32 grams/day: do not to exceed 8 grams/day/single joint of upper extremities; do not to exceed 16 grams/day/single joint of lower extremities.  Please request refill of this medication from your PCP.    doxycycline (MONODOX) 100 mg, Oral, Every 12 hours    fluticasone-umeclidin-vilanter (TRELEGY ELLIPTA) 100-62.5-25 mcg DsDv 1 puff, Inhalation, Daily    furosemide (LASIX) 20 mg, Oral, Daily PRN    losartan (COZAAR) 100 mg, Oral, Daily    omeprazole (PRILOSEC) 20 mg, Oral, Daily    predniSONE (DELTASONE) 20 MG  "tablet Take 1 tablet by mouth twice daily for 5 days    rosuvastatin (CRESTOR) 40 mg, Oral, Nightly    triamcinolone acetonide 0.1% (KENALOG) 0.1 % cream Topical (Top), 2 times daily PRN        Review of patient's allergies indicates:  No Known Allergies    Patient Care Team:  Stephanie Iraheta FNP as PCP - General (Family Medicine)  Isabel Riojas FNP as Nurse Practitioner (Nephrology)    Objective:     Visit Vitals  /77   Pulse 77   Temp 97.6 °F (36.4 °C) (Oral)   Resp 18   Ht 5' 2" (1.575 m)   Wt 101.2 kg (223 lb)   SpO2 99%   BMI 40.79 kg/m²       Physical Examination:     Physical Exam  Vitals reviewed.   Constitutional:       Appearance: Normal appearance. She is normal weight.   HENT:      Head: Normocephalic.   Cardiovascular:      Rate and Rhythm: Normal rate and regular rhythm.      Pulses: Normal pulses.      Heart sounds: Normal heart sounds.   Pulmonary:      Effort: Pulmonary effort is normal.      Breath sounds: Normal breath sounds.   Abdominal:      General: Abdomen is flat.      Palpations: Abdomen is soft.   Musculoskeletal:         General: Normal range of motion.      Cervical back: Normal range of motion.   Skin:     General: Skin is warm and dry.   Neurological:      Mental Status: She is alert.   Psychiatric:         Mood and Affect: Mood normal.         Lab Results:     Chemistry:  Lab Results   Component Value Date     01/31/2025    K 4.6 01/31/2025    BUN 22.1 (H) 01/31/2025    CREATININE 1.25 (H) 01/31/2025    EGFRNORACEVR 46 01/31/2025    CALCIUM 9.6 01/31/2025    ALKPHOS 111 01/31/2025    ALBUMIN 3.9 01/31/2025    BILIDIR 0.2 01/11/2019    IBILI 0.3 01/11/2019    AST 25 01/31/2025    ALT 18 01/31/2025    MG 2.20 01/04/2024    PHOS 3.4 01/04/2024    ITOARVYH25MJ 40.08 11/08/2018    TSH 1.769 11/19/2024    GJPVPA2LFUG 0.81 11/19/2024        Lab Results   Component Value Date    HGBA1C 5.7 11/19/2024        Hematology:  Lab Results   Component Value Date    WBC 7.51 07/26/2024 "    HGB 14.6 07/26/2024    HCT 43.9 07/26/2024     07/26/2024       Lipid Panel:  Lab Results   Component Value Date    CHOL 141 11/19/2024    HDL 56 11/19/2024    LDL 69.00 11/19/2024    TRIG 82 11/19/2024    TOTALCHOLEST 3 11/19/2024        Urine:  Lab Results   Component Value Date    APPEARANCEUA Clear 01/31/2025    SGUA 1.006 01/31/2025    PROTEINUA Negative 01/31/2025    KETONESUA Negative 01/31/2025    LEUKOCYTESUR Negative 01/31/2025    RBCUA None Seen 01/31/2025    WBCUA 0-5 01/31/2025    BACTERIA None Seen 01/31/2025    SQEPUA Trace (A) 01/31/2025    HYALINECASTS None Seen 01/31/2025    CREATRANDUR 23.2 (L) 01/31/2025    PROTEINURINE <6.8 01/31/2025    UPROTCREA 0.1 07/26/2024        Assessment:          ICD-10-CM ICD-9-CM   1. Psoriasis  L40.9 696.1   2. Atopic dermatitis, unspecified type  L20.9 691.8   3. Chronic obstructive pulmonary disease, unspecified COPD type  J44.9 496   4. Asthma, unspecified asthma severity, unspecified whether complicated, unspecified whether persistent  J45.909 493.90   5. Primary hypertension  I10 401.9   6. Mixed hyperlipidemia  E78.2 272.2   7. Stage 3b chronic kidney disease  N18.32 585.3   8. Vitamin D deficiency  E55.9 268.9   9. Severe obesity (BMI 35.0-39.9) with comorbidity  E66.01 278.01   10. Prediabetes  R73.03 790.29   11. Impaired glucose tolerance  R73.02 790.22   12. Colon cancer screening  Z12.11 V76.51   13. Obstructive sleep apnea syndrome  G47.33 327.23   14. Osteoarthritis of left knee, unspecified osteoarthritis type  M17.12 715.96   15. Gastroesophageal reflux disease without esophagitis  K21.9 530.81   16. De Quervain's tenosynovitis  M65.4 727.04   17. Screening for colorectal cancer  Z12.11 V76.51    Z12.12 V76.41          Plan:     1. Psoriasis  Assessment & Plan:  Chronic, stable issue.  Disease is chronic in nature and will consist of flares and remission.  Consider going gluten free in your diet to help with symptoms.  Severity increases  with cold climates.  Maintain good skin hygiene with daily baths or showers.  Avoid harsh soaps.  Avoid skin injury, including harsh scrubbing, which can trigger new outbreaks.  Avoid skin dryness.  To reduce scaling, use nonprescriptions, waterless cleansers, and hair preparations containing coal tar or products containing cortisone.  Expose the skin to moderate amounts of sunlight as often as possible. Avoid long periods in sun to prevent sunburn.  Oatmeal baths may loosen scales. 1 cup of oatmeal to a tub of warm water.  Stress may increase outbreaks. Consider counseling to assist in lifestyle changes, coping or any psychological problems caused by psoriasis.         2. Atopic dermatitis, unspecified type  Assessment & Plan:  Continue triamcinolone.       3. Chronic obstructive pulmonary disease, unspecified COPD type  Overview:  PFTs 2/28/23: Nonspecific decrease in flow rates with no measured obstruction or restriction      Assessment & Plan:  Managed with Trelegy once daily, albuterol inhaler as needed, she does have home O2 that she uses at night as needed, last hospitalization noted January 20, 2024 and she has been feeling well and stable.  She does take Tessalon as needed for increased coughing.  Did discuss the possibility of putting a standing order in for doxycycline and prednisone for her to take when she feels a COPD exacerbation flare starting.  Patient is very aware of the signs and symptoms to watch for that would suggest an impending exacerbation.  She is cognitively aware of her disease process and knows that she would not be able to take these antibiotics on an ongoing frequent basis but yes just to use to prevent her from going to urgent care or ER when she starts to feel sick.  She is amenable to this plan the prescriptions have both been sent to the pharmacy for doxycycline 100 mg to take twice a day by mouth for 7 days.  Also prednisone 20 mg twice a day for 5 days has been sent to the  pharmacy for her to take when she takes the doxycycline.  She has nebulizer treatments at home that she will utilize if necessary and her albuterol inhaler needs to be refilled today.  The standard dose of prednisone for a COPD exacerbation is typically 40 mg daily. The duration of treatment is generally 5 - 7 days. Tapering is usually not necessary for short courses of treatment    Orders:  -     doxycycline (MONODOX) 100 MG capsule; Take 1 capsule (100 mg total) by mouth every 12 (twelve) hours.  Dispense: 14 capsule; Refill: 11  -     predniSONE (DELTASONE) 20 MG tablet; Take 1 tablet by mouth twice daily for 5 days  Dispense: 10 tablet; Refill: 11  -     albuterol (PROVENTIL/VENTOLIN HFA) 90 mcg/actuation inhaler; Inhale 1 puff into the lungs every 4 (four) hours as needed for Wheezing or Shortness of Breath.  Dispense: 6.7 g; Refill: 11    4. Asthma, unspecified asthma severity, unspecified whether complicated, unspecified whether persistent  Assessment & Plan:  Continue Trelegy  Use your rescue inhaler and nebulizer for acute asthma symptoms when they occur. These are your rescue medications and help to relax tight muscles around your airways. If you are having to use these medications more than 2x per week, please notify the clinic as this could indicate poor asthma control.  Avoid asthma triggers (i.e., pet dander, molds, dust mites, cockroaches, pollen, cigarette smoke, respiratory illnesses, etc)  Stay up-to-date with immunizations, especially the influenza and pneumonia vaccinations        5. Primary hypertension  Overview:  BP Readings from Last 3 Encounters:   06/11/25 119/77   05/19/25 136/82   02/06/25 131/83         Assessment & Plan:  Nephrology manages. Losartan 100mg po daily to continue; Amlodipine to continue as well. BP today is 119/77 :)   Low Sodium Diet (Dash Diet - less than 2 grams of sodium per day).  Monitor Blood Pressure daily and log. Report any consistent numbers greater than  "140/90.  Smoking Cessation encouraged to aid in BP reduction.  Maintain healthy weight with goal BMI <30. Exercise 30 minutes per day 5 days per week      Orders:  -     CBC Auto Differential  -     Comprehensive Metabolic Panel    6. Mixed hyperlipidemia  Overview:  Lab Results   Component Value Date    CHOL 141 11/19/2024    CHOL 133 11/27/2023    CHOL 142 04/24/2023     Lab Results   Component Value Date    HDL 56 11/19/2024    HDL 51 11/27/2023    HDL 52 04/24/2023     No results found for: "LDLCALC"  Lab Results   Component Value Date    TRIG 82 11/19/2024    TRIG 119 11/27/2023    TRIG 89 04/24/2023       No results found for: "CHOLHDL"      Assessment & Plan:  Stressed importance of dietary modifications. Follow a low cholesterol, low saturated fat diet with less that 200mg of cholesterol a day.  Avoid fried foods and high saturated fats (high saturated fats less than 7% of calories).  Add Flax Seed/Fish Oil supplements to diet. Increase dietary fiber.  Regular exercise can reduce LDL and raise HDL. Stressed importance of physical activity 5 times per week for 30 minutes per day.         Orders:  -     CBC Auto Differential  -     Comprehensive Metabolic Panel    7. Stage 3b chronic kidney disease  Assessment & Plan:  Stable, chronic.  Avoid NSAIDs, avoid nephrotoxic agents, continue follow-up with Nephrology.  Continue to hydrate well daily.  Reviewed trending labs.  Discussed renal diet advice.  Discussed prevention of worsening renal indices and how to maintain healthy kidney function. Maintain optimal blood pressure control.        8. Vitamin D deficiency  Assessment & Plan:  Vitamin D will be repeated in 6 months      9. Severe obesity (BMI 35.0-39.9) with comorbidity  Overview:  Wt Readings from Last 3 Encounters:   06/11/25 0939 101.2 kg (223 lb)   05/19/25 0925 101.6 kg (224 lb)   05/12/25 1100 101 kg (222 lb 10.6 oz)         Assessment & Plan:  Encourage weight loss and exercise as tolerated at this " time. Discussed protein intake, ideas for healthy snacks given. Discussed increasing fiber, water intake to curb cravings.    -BMI Body mass index is 40.79 kg/m².   -Goal BMI <30.  -Exercise 5 times a week for 30 minutes per day.  -Avoid soda, simple sugars, excessive rice, potatoes or bread. Limit fast foods and fried foods.  -Choose complex carbs in moderation (example: green vegetables, beans, oatmeal). Eat plenty of fresh fruits and vegetables with lean meats daily.  -Do not skip meals. Eat a balanced portion size.  -Avoid fad diets. Consider permanent healthy life style changes.           10. Prediabetes  Overview:  Lab Results   Component Value Date    HGBA1C 5.7 11/19/2024         Assessment & Plan:  -Prediabetes: 5.7%-6.4%  -Diabetes: 6.5% or greater    --Recommendations:  - Follow a diabetic diet- Low-fat, Low-carb, Low-cholesterol. Instructed to avoid excessive intake of sugary/dark drinks/sodas and white foods (i.e., bread, pasta, potatoes, rice).  - Aerobic exercise recommended which can lower A1c at least 1 point: 20-30 min/day x 5 days/week.       Orders:  -     Hemoglobin A1C    11. Impaired glucose tolerance  Assessment & Plan:  HgA1c: 5.7%  Prediabetes: 5.7%-6.4%  Diabetes: 6.5% or greater  Recommendations:  Educated on a diabetic diet- Low-fat, Low-carb, Low-cholesterol. Instructed to avoid excessive intake of sugary/dark drinks/sodas and white foods (i.e., bread, pasta, potatoes, rice).  Encouraged aerobic exercise: 20-30 min/day x 5 days/week.      Orders:  -     Hemoglobin A1C    12. Colon cancer screening  -     Fecal Immunochemical Test (iFOBT); Future; Expected date: 06/11/2025    13. Obstructive sleep apnea syndrome  Assessment & Plan:  -Reports compliance with wearing CPAP/BIPAP nightly.  -Reports decreased EDS, snoring and fatigue.  -Pt is benefiting from its use.  -Expect lifetime use and decreased daytime sleepiness/fatigue.  -Avoid excessive alcohol, smoking and overuse of sedatives at  bedtime.  -Weight management discussed.  -Adjust sleep position as needed for increased comfort.        14. Osteoarthritis of left knee, unspecified osteoarthritis type  Assessment & Plan:  Pt had been following Ortho. S/p CSI. Doing better. Chronic, stable issue.      15. Gastroesophageal reflux disease without esophagitis  Assessment & Plan:  Counseled to avoid trigger foods.  Healthy weight maintenance advised.  Avoid lying down for at least 30 minutes after eating.  Best eat small frequent meals.  Practice low carb, low-fat, low-cholesterol diet.  May continue PPI therapy, precautions advised.  Smoking cessation advised (if a smoker).        16. De Quervain's tenosynovitis  Assessment & Plan:  FU with ortho for injections.      17. Screening for colorectal cancer  Overview:  Health Maintenance:   Colon Ca Screening-FIT 7/28/21, negative; 5/3/23, negative   Breast Ca Screening-Mammo 11/7/2022, negative; 11/27/23, negative   Lung Ca Screening-declines   Bone Density 11/7/2022, normal BMD      Assessment & Plan:  FIT kit provided.              Follow up in about 6 months (around 12/11/2025)..  In addition to their scheduled follow up, the patient has also been instructed to follow up on as needed basis.       Future Appointments   Date Time Provider Department Center   8/4/2025  9:30 AM Isabel Riojas FNP [facility] [place]    12/15/2025  9:40 AM Stephanie Iraheta FNP [facility]   5/12/2026 10:20 AM [facility] [place]

## 2025-06-11 NOTE — ASSESSMENT & PLAN NOTE
-Prediabetes: 5.7%-6.4%  -Diabetes: 6.5% or greater    --Recommendations:  - Follow a diabetic diet- Low-fat, Low-carb, Low-cholesterol. Instructed to avoid excessive intake of sugary/dark drinks/sodas and white foods (i.e., bread, pasta, potatoes, rice).  - Aerobic exercise recommended which can lower A1c at least 1 point: 20-30 min/day x 5 days/week.

## 2025-06-11 NOTE — ASSESSMENT & PLAN NOTE
Nephrology manages. Losartan 100mg po daily to continue; Amlodipine to continue as well. BP today is 119/77 :)   Low Sodium Diet (Dash Diet - less than 2 grams of sodium per day).  Monitor Blood Pressure daily and log. Report any consistent numbers greater than 140/90.  Smoking Cessation encouraged to aid in BP reduction.  Maintain healthy weight with goal BMI <30. Exercise 30 minutes per day 5 days per week

## 2025-06-11 NOTE — ASSESSMENT & PLAN NOTE
Managed with Trelegy once daily, albuterol inhaler as needed, she does have home O2 that she uses at night as needed, last hospitalization noted January 20, 2024 and she has been feeling well and stable.  She does take Tessalon as needed for increased coughing.  Did discuss the possibility of putting a standing order in for doxycycline and prednisone for her to take when she feels a COPD exacerbation flare starting.  Patient is very aware of the signs and symptoms to watch for that would suggest an impending exacerbation.  She is cognitively aware of her disease process and knows that she would not be able to take these antibiotics on an ongoing frequent basis but yes just to use to prevent her from going to urgent care or ER when she starts to feel sick.  She is amenable to this plan the prescriptions have both been sent to the pharmacy for doxycycline 100 mg to take twice a day by mouth for 7 days.  Also prednisone 20 mg twice a day for 5 days has been sent to the pharmacy for her to take when she takes the doxycycline.  She has nebulizer treatments at home that she will utilize if necessary and her albuterol inhaler needs to be refilled today.  The standard dose of prednisone for a COPD exacerbation is typically 40 mg daily. The duration of treatment is generally 5 - 7 days. Tapering is usually not necessary for short courses of treatment

## 2025-06-11 NOTE — ASSESSMENT & PLAN NOTE
-Reports compliance with wearing CPAP/BIPAP nightly.  -Reports decreased EDS, snoring and fatigue.  -Pt is benefiting from its use.  -Expect lifetime use and decreased daytime sleepiness/fatigue.  -Avoid excessive alcohol, smoking and overuse of sedatives at bedtime.  -Weight management discussed.  -Adjust sleep position as needed for increased comfort.

## 2025-06-11 NOTE — PATIENT INSTRUCTIONS
Problem: Skin Integrity  Goal: Skin integrity is maintained or improved  Outcome: Progressing     Problem: Fall Risk  Goal: Patient will remain free from falls  Outcome: Progressing     The patient is Stable - Low risk of patient condition declining or worsening    Shift Goals  Clinical Goals: maintain skin integrity  Patient Goals: Rest    Progress made toward(s) clinical / shift goals:  patient is compliant with the use of call light. Patient on low airloss mattress, barrier paste applied.     Patient is not progressing towards the following goals:       Venu Joiner,     If you are due for any health screening(s) below please notify me so we can arrange them to be ordered and scheduled. Most healthy patients at your age complete them, but you are free to accept or refuse.     If you can't do it, I'll definitely understand. If you can, I'd certainly appreciate it!    Tests to Keep You Healthy    Mammogram: Met on 12/1/2024  Colon Cancer Screening: ORDERED  Last Blood Pressure <= 139/89 (6/11/2025): Yes      Its time for your colon cancer screening     Colorectal cancer is one of the leading causes of cancer death for men and women but it doesnt have to be. Screenings can prevent colorectal cancer or find it early enough to treat and cure the disease.     Our records indicate that you may be overdue for colon cancer screening. A colonoscopy or stool screening test can help identify patients at risk for developing colon cancer. Cancer screenings save lives, so schedule yours today to stay healthy.     A colonoscopy is the preferred test for detecting colon cancer. It is needed only once every 10 years if results are negative. While you are sedated, a flexible, lighted tube with a tiny camera is inserted into the rectum and advanced through the colon to look for cancers.     An alternative screening test that is used at home and returned to the lab may also be used. It detects hidden blood in bowel movements which could indicate cancer in the colon. If results are positive, you will need a colonoscopy to determine if the blood is a sign of cancer. This type of follow up (diagnostic) colonoscopy usually requires additional copays as required by your insurance provider.     If you recently had your colon cancer screening performed outside of [facility], please let your Health care team know so that they can update your health record. Please contact your PCP if you have any questions.

## 2025-06-11 NOTE — ASSESSMENT & PLAN NOTE
Encourage weight loss and exercise as tolerated at this time. Discussed protein intake, ideas for healthy snacks given. Discussed increasing fiber, water intake to curb cravings.    -BMI Body mass index is 40.79 kg/m².   -Goal BMI <30.  -Exercise 5 times a week for 30 minutes per day.  -Avoid soda, simple sugars, excessive rice, potatoes or bread. Limit fast foods and fried foods.  -Choose complex carbs in moderation (example: green vegetables, beans, oatmeal). Eat plenty of fresh fruits and vegetables with lean meats daily.  -Do not skip meals. Eat a balanced portion size.  -Avoid fad diets. Consider permanent healthy life style changes.

## 2025-06-12 ENCOUNTER — RESULTS FOLLOW-UP (OUTPATIENT)
Dept: FAMILY MEDICINE | Facility: CLINIC | Age: 75
End: 2025-06-12

## 2025-06-12 ENCOUNTER — TELEPHONE (OUTPATIENT)
Dept: FAMILY MEDICINE | Facility: CLINIC | Age: 75
End: 2025-06-12

## 2025-06-12 DIAGNOSIS — R71.8 ELEVATED MCV: Primary | ICD-10-CM

## 2025-06-12 DIAGNOSIS — Z79.899 OTHER LONG TERM (CURRENT) DRUG THERAPY: ICD-10-CM

## 2025-06-12 NOTE — PROGRESS NOTES
I have sent medications and/or lab orders in for this patient.  Please notify the patient.     Orders Placed This Encounter   Procedures    Folate           Standing Status:   Future     Expected Date:   6/12/2025     Expiration Date:   9/12/2025    Vitamin B12           Standing Status:   Future     Expected Date:   6/12/2025     Expiration Date:   9/12/2025

## 2025-06-16 ENCOUNTER — CLINICAL SUPPORT (OUTPATIENT)
Dept: FAMILY MEDICINE | Facility: CLINIC | Age: 75
End: 2025-06-16

## 2025-06-16 ENCOUNTER — RESULTS FOLLOW-UP (OUTPATIENT)
Dept: FAMILY MEDICINE | Facility: CLINIC | Age: 75
End: 2025-06-16

## 2025-06-16 DIAGNOSIS — R71.8 ELEVATED MCV: ICD-10-CM

## 2025-06-16 DIAGNOSIS — Z79.899 OTHER LONG TERM (CURRENT) DRUG THERAPY: ICD-10-CM

## 2025-06-16 LAB
FOLATE SERPL-MCNC: 7.9 NG/ML (ref 7–31.4)
VIT B12 SERPL-MCNC: 473 PG/ML (ref 213–816)

## 2025-06-16 PROCEDURE — 99211 OFF/OP EST MAY X REQ PHY/QHP: CPT | Mod: PBBFAC,PN

## 2025-06-16 PROCEDURE — 82746 ASSAY OF FOLIC ACID SERUM: CPT

## 2025-06-16 PROCEDURE — 82607 VITAMIN B-12: CPT

## 2025-06-18 DIAGNOSIS — J44.9 CHRONIC OBSTRUCTIVE PULMONARY DISEASE, UNSPECIFIED COPD TYPE: ICD-10-CM

## 2025-06-18 RX ORDER — FLUTICASONE FUROATE, UMECLIDINIUM BROMIDE AND VILANTEROL TRIFENATATE 100; 62.5; 25 UG/1; UG/1; UG/1
1 POWDER RESPIRATORY (INHALATION) DAILY
Qty: 60 EACH | Refills: 6 | Status: SHIPPED | OUTPATIENT
Start: 2025-06-18

## 2025-06-18 NOTE — TELEPHONE ENCOUNTER
Copied from CRM #[id number]. Topic: Medications - Medication Status Check   >> Jun 17, 2025  4:00 PM Pb wrote:  .Who Called: Marisel Resendez    Caller is requesting assistance/information from provider's office.    Symptoms (please be specific): pt states that the pcp needs to call in the Rx for    fluticasone-umeclidin-vilanter (TRELEGY ELLIPTA) 100-62.5-25 mcg DsDv     How long has patient had these symptoms:  n/a   List of preferred pharmacies on file (remove unneeded): [unfilled]  If different, enter pharmacy into here including location and phone number:       Preferred Method of Contact: Phone Call  Patient's Preferred Phone Number on File: [phone]   Best Call Back Number, if different:  Additional Information: please call [phone]

## 2025-06-30 ENCOUNTER — TELEPHONE (OUTPATIENT)
Dept: FAMILY MEDICINE | Facility: CLINIC | Age: 75
End: 2025-06-30

## 2025-06-30 NOTE — TELEPHONE ENCOUNTER
Copied from CRM #[id number]. Topic: Medications - Medication Refill  >> Jun 30, 2025  9:54 AM Yaneth wrote:  .Type:  Patient Returning Call    Who Called:pt  Who Left Message for Patient:pt  Does the patient know what this is regarding?:fluticasone-umeclidin-vilanter (TRELEGY ELLIPTA) 100-62.5-25 mcg DsDv  Would the patient rather a call back or a response via [facility]?   Best Call Back Number:[phone]  Additional Information: Please call the pharmacy [facility] ph# [phone] hit 4 then 2 to connect to them about fluticasone-umeclidin-vilanter (TRELEGY ELLIPTA) 100-62.5-25 mcg DsDv

## 2025-07-10 ENCOUNTER — PATIENT MESSAGE (OUTPATIENT)
Facility: CLINIC | Age: 75
End: 2025-07-10

## 2025-07-31 ENCOUNTER — APPOINTMENT (OUTPATIENT)
Dept: LAB | Facility: HOSPITAL | Age: 75
End: 2025-07-31

## 2025-07-31 ENCOUNTER — LAB VISIT (OUTPATIENT)
Dept: LAB | Facility: HOSPITAL | Age: 75
End: 2025-07-31
Attending: NURSE PRACTITIONER

## 2025-07-31 DIAGNOSIS — N18.32 STAGE 3B CHRONIC KIDNEY DISEASE: ICD-10-CM

## 2025-07-31 LAB
ALBUMIN SERPL-MCNC: 3.8 G/DL (ref 3.4–4.8)
ALBUMIN/GLOB SERPL: 1.1 RATIO (ref 1.1–2)
ALP SERPL-CCNC: 126 UNIT/L (ref 40–150)
ALT SERPL-CCNC: 18 UNIT/L (ref 0–55)
ANION GAP SERPL CALC-SCNC: 7 MEQ/L
AST SERPL-CCNC: 23 UNIT/L (ref 11–45)
BACTERIA #/AREA URNS AUTO: ABNORMAL /HPF
BILIRUB SERPL-MCNC: 0.7 MG/DL
BILIRUB UR QL STRIP.AUTO: NEGATIVE
BUN SERPL-MCNC: 17.6 MG/DL (ref 9.8–20.1)
CALCIUM SERPL-MCNC: 9.1 MG/DL (ref 8.4–10.2)
CHLORIDE SERPL-SCNC: 108 MMOL/L (ref 98–107)
CLARITY UR: CLEAR
CO2 SERPL-SCNC: 27 MMOL/L (ref 23–31)
COLOR UR AUTO: COLORLESS
CREAT SERPL-MCNC: 1.28 MG/DL (ref 0.55–1.02)
CREAT UR-MCNC: 25.6 MG/DL (ref 45–106)
CREAT/UREA NIT SERPL: 14
GFR SERPLBLD CREATININE-BSD FMLA CKD-EPI: 45 ML/MIN/1.73/M2
GLOBULIN SER-MCNC: 3.4 GM/DL (ref 2.4–3.5)
GLUCOSE SERPL-MCNC: 91 MG/DL (ref 82–115)
GLUCOSE UR QL STRIP: NORMAL
HGB UR QL STRIP: NEGATIVE
HYALINE CASTS #/AREA URNS LPF: ABNORMAL /LPF
KETONES UR QL STRIP: NEGATIVE
LEUKOCYTE ESTERASE UR QL STRIP: NEGATIVE
MUCOUS THREADS URNS QL MICRO: ABNORMAL /LPF
NITRITE UR QL STRIP: NEGATIVE
PH UR STRIP: 6.5 [PH]
POTASSIUM SERPL-SCNC: 4.4 MMOL/L (ref 3.5–5.1)
PROT SERPL-MCNC: 7.2 GM/DL (ref 5.8–7.6)
PROT UR QL STRIP: NEGATIVE
PROT UR STRIP-MCNC: <6.8 MG/DL
PTH-INTACT SERPL-MCNC: 112.6 PG/ML (ref 8.7–77)
RBC #/AREA URNS AUTO: ABNORMAL /HPF
SODIUM SERPL-SCNC: 142 MMOL/L (ref 136–145)
SP GR UR STRIP.AUTO: 1.01 (ref 1–1.03)
SQUAMOUS #/AREA URNS LPF: ABNORMAL /HPF
UROBILINOGEN UR STRIP-ACNC: NORMAL
WBC #/AREA URNS AUTO: ABNORMAL /HPF

## 2025-07-31 PROCEDURE — 83970 ASSAY OF PARATHORMONE: CPT

## 2025-07-31 PROCEDURE — 80053 COMPREHEN METABOLIC PANEL: CPT

## 2025-07-31 PROCEDURE — 84156 ASSAY OF PROTEIN URINE: CPT

## 2025-07-31 PROCEDURE — 36415 COLL VENOUS BLD VENIPUNCTURE: CPT

## 2025-07-31 PROCEDURE — 81001 URINALYSIS AUTO W/SCOPE: CPT

## 2025-08-04 ENCOUNTER — OFFICE VISIT (OUTPATIENT)
Dept: NEPHROLOGY | Facility: CLINIC | Age: 75
End: 2025-08-04

## 2025-08-04 VITALS
HEIGHT: 62 IN | OXYGEN SATURATION: 96 % | WEIGHT: 225 LBS | RESPIRATION RATE: 20 BRPM | HEART RATE: 85 BPM | DIASTOLIC BLOOD PRESSURE: 80 MMHG | TEMPERATURE: 98 F | BODY MASS INDEX: 41.41 KG/M2 | SYSTOLIC BLOOD PRESSURE: 134 MMHG

## 2025-08-04 DIAGNOSIS — N18.31 CKD STAGE G3A/A1, GFR 45-59 AND ALBUMIN CREATININE RATIO <30 MG/G: Primary | ICD-10-CM

## 2025-08-04 DIAGNOSIS — I10 PRIMARY HYPERTENSION: ICD-10-CM

## 2025-08-04 DIAGNOSIS — E66.01 SEVERE OBESITY (BMI >= 40): ICD-10-CM

## 2025-08-04 DIAGNOSIS — M79.10 MYALGIA: ICD-10-CM

## 2025-08-04 DIAGNOSIS — N25.81 SECONDARY HYPERPARATHYROIDISM OF RENAL ORIGIN: ICD-10-CM

## 2025-08-04 PROCEDURE — 99215 OFFICE O/P EST HI 40 MIN: CPT | Mod: PBBFAC | Performed by: NURSE PRACTITIONER

## 2025-08-04 PROCEDURE — 99214 OFFICE O/P EST MOD 30 MIN: CPT | Mod: S$PBB,,, | Performed by: NURSE PRACTITIONER

## 2025-08-04 NOTE — PROGRESS NOTES
[facility]  Nephrology Clinic      Chief Complaint: Chronic Kidney Disease (Follow up, fatigue, not feeling like herself, body aching)      MRN: [id number]    Patient's demographics: Marisel Resendez is a 74 y.o. White female born on 1950    History of present illness:   The patient presents to Nephrology clinic today for ongoing management of chronic kidney disease. The patient's pertinent chronic problems include solitary kidney (patient donated a kidney to her relative in 2003), hypertension, dyslipidemia, anxiety, COPD, and DARRON (not on NIPPV).  Today patient is complaining of generalized aching.  States that this pain has been present for the past couple of months, denies any trauma, changes in medication therapy.  Reports pain over major muscle groups associated with weakness and fatigue.    Review of Systems  12 point review of systems conducted, negative except as stated in the history of present illness.    Allergies: Patient has no known allergies.     Past Medical History:  has a past medical history of Anxiety disorder, unspecified, Atypical chest pain, Bilateral leg edema, CKD (chronic kidney disease), COPD (chronic obstructive pulmonary disease), Dyslipidemia, History of radical nephrectomy, HTN (hypertension), and DARRON (obstructive sleep apnea).    Procedure History:  has a past surgical history that includes Cataract extraction w/ intraocular lens implant (Right, 08/05/2021); Cataract extraction w/ intraocular lens implant (Left, 07/13/2021); Hysterectomy (1997); bilateral oophrectomy (1990); Robot-assisted laparoscopic donor nephrectomy (Right, 2002); Laparoscopic cholecystectomy (N/A, 08/04/2023); Eye surgery (08/2021); and Cholecystectomy.    Family History: family history includes Cancer in her mother; Heart attack in her father; Heart failure in her father.    Social History:  reports that she quit smoking about 8 years ago. Her smoking use included cigarettes.  "She has been exposed to tobacco smoke. She has never used smokeless tobacco. She reports that she does not drink alcohol and does not use drugs.    Physical exam  /80 (BP Location: Right arm, Patient Position: Sitting)   Pulse 85   Temp 98.1 °F (36.7 °C) (Oral)   Resp 20   Ht 5' 2" (1.575 m)   Wt 102.1 kg (225 lb)   SpO2 96%   BMI 41.15 kg/m²   General appearance: Patient is in no acute distress.  Skin: No rashes or wounds.  HEENT: PERRLA, EOMI, no scleral icterus, no JVD. Neck is supple.  Chest: Respirations are unlabored. Lungs sounds are clear.   Heart: S1, S2.   Abdomen: Benign.  : Deferred.  Extremities: No edema, peripheral pulses are palpable.   Neuro: No focal deficits.     Home Medications:  Current Medications[1]    Laboratory data    Lab Results   Component Value Date    WBC 6.91 06/11/2025    HGB 14.4 06/11/2025    HCT 44.1 06/11/2025     06/11/2025    IRON 84 12/01/2017    TIBC 343 12/01/2017    LABIRON 24.5 12/01/2017    FERRITIN 68.3 12/01/2017    FOLATE 7.9 06/16/2025    ERUXICYV36 473 06/16/2025     07/31/2025    K 4.4 07/31/2025    CO2 27 07/31/2025    BUN 17.6 07/31/2025    CREATININE 1.28 (H) 07/31/2025    EGFRNORACEVR 45 07/31/2025    CALCIUM 9.1 07/31/2025    ALKPHOS 126 07/31/2025    ALBUMIN 3.8 07/31/2025    BILIDIR 0.2 01/11/2019    IBILI 0.3 01/11/2019    AST 23 07/31/2025    ALT 18 07/31/2025    MG 2.20 01/04/2024    PHOS 3.4 01/04/2024      Lab Results   Component Value Date    HGBA1C 6.0 06/11/2025    .6 (H) 07/31/2025    PFOGLESG00EX 40.08 11/08/2018     Urine:  Lab Results   Component Value Date    APPEARANCEUA Clear 07/31/2025    SGUA 1.006 07/31/2025    PROTEINUA Negative 07/31/2025    KETONESUA Negative 07/31/2025    LEUKOCYTESUR Negative 07/31/2025    RBCUA 0-5 07/31/2025    WBCUA None Seen 07/31/2025    BACTERIA Trace (A) 07/31/2025    SQEPUA Trace (A) 07/31/2025    HYALINECASTS 0-2 (A) 07/31/2025    CREATRANDUR 25.6 (L) 07/31/2025    " PROTEINURINE <6.8 07/31/2025    UPROTCREA 0.1 07/26/2024    MICALBCREAT 4.5 07/26/2024      Microbiology Results (Last 14 Days)       ** No results found for the last 336 hours. **            Imaging  (06/10/2015 08:58 CDT US Retroperitoneum Limited)  Clinical indication: Patient donated right kidney, solitary kidney.  Imaging of the retroperitoneal areas were obtained, with the right  kidney noted be surgically absent with no abnormality seen in the  right renal fossa. The left kidney is normal in appearance measuring  11.5 x 6.0 x 5.2 cm. There is no hydronephrosis, calculi, or evidence  of a solid mass. No perirenal fluid collections are demonstrated.  Renal cortex demonstrates normal echogenicity. The bladder was not  imaged.  Impression: Status post right nephrectomy with normal appearance the  left kidney.       Impression    ICD-10-CM ICD-9-CM   1. CKD stage G3a/A1, GFR 45-59 and albumin creatinine ratio <30 mg/g  N18.31 585.3   2. Primary hypertension  I10 401.9   3. Secondary hyperparathyroidism of renal origin  N25.81 588.81   4. Severe obesity (BMI >= 40)  E66.01 278.01   5. Myalgia  M79.10 729.1        Plan  CKD stage G3a/A1, GFR 45-59 and albumin creatinine ratio <30 mg/g  Solitary kidney.  Renal function has been is stable, there is no significant proteinuria.  Continue risk factor management and periodic monitoring.  Continue:  -follow 2 g a day dietary sodium restriction  -control high blood pressure (goal blood pressure is less than 130/80, please check blood pressure twice a week and bring blood pressure logs to office visit)  -exercise at least 30 minutes a day, 5 days a week  -maintain healthy weight  -stay well hydrated (drink water only, avoid juices, sweet tea, and sodas)  -ask about staying up-to-date on vaccinations (flu vaccine, pneumonia vaccine, hepatitis B vaccine)  -avoid excessive use of NSAIDs (ibuprofen, naproxen, Aleve, Advil, Toradol, Mobic), take Tylenol as needed for headache or  mild pain  -take cholesterol lowering medications if prescribed (LDL goal less than 100)    Primary hypertension  Blood pressure reading is at goal.  Continue current medication regimen.  Patient was advised on lifestyle modifications to manage hypertension, including adopting a low-sodium diet (limit processed foods and salt intake), engaging in regular physical activity (at least 150 minutes of moderate exercise per week), maintaining a healthy weight, and moderating alcohol consumption. Continue periodic blood pressure monitoring at home and on follow up visits.      Secondary hyperparathyroidism of renal origin  There are no indications for active vitamin-D analogs at this time.  Will continue to monitor intact PTH, calcium, and phosphorus levels periodically.      Severe obesity (BMI >= 40)  Lifestyle and dietary interventions discussed, patient encouraged to maintain non-sedentary lifestyle and well-balanced diet.     Myalgia  Possibly due to statin therapy.  Patient was advised to hold Crestor for the next 2 weeks, resume therapy, and if myalgias worsen while on a statin, notify the clinic.     Follow up in about 4 months (around 12/4/2025).     Orders Placed This Encounter   Procedures    Comprehensive Metabolic Panel     Standing Status:   Future     Expected Date:   11/25/2025     Expiration Date:   12/25/2025    Microalbumin/Creatinine Ratio, Urine     Standing Status:   Future     Expected Date:   11/25/2025     Expiration Date:   12/25/2025     Specimen Source:   Urine     Send normal result to authorizing provider's In Basket if patient is active on MyChart::   Yes    Urinalysis, Reflex to Urine Culture     Standing Status:   Future     Expected Date:   11/25/2025     Expiration Date:   12/25/2025     Specimen Source:   Urine    CK     Standing Status:   Future     Expected Date:   11/25/2025     Expiration Date:   12/25/2025     Send normal result to authorizing provider's In Basket if patient is  active on MyChart::   Yes        Isabel Riojas NP  [facility]            [1]   Current Outpatient Medications:     albuterol (PROVENTIL/VENTOLIN HFA) 90 mcg/actuation inhaler, Inhale 1 puff into the lungs every 4 (four) hours as needed for Wheezing or Shortness of Breath., Disp: 6.7 g, Rfl: 11    albuterol-ipratropium (DUO-NEB) 2.5 mg-0.5 mg/3 mL nebulizer solution, Take 3 mLs by nebulization 4 (four) times daily as needed for Shortness of Breath or Wheezing., Disp: 75 mL, Rfl: 6    amLODIPine (NORVASC) 5 MG tablet, Take 1 tablet (5 mg total) by mouth once daily., Disp: 90 tablet, Rfl: 3    benzonatate (TESSALON) 100 MG capsule, Take 1 capsule (100 mg total) by mouth 3 (three) times daily as needed for Cough., Disp: 30 capsule, Rfl: 1    calcium carbonate (OS-GINGER) 500 mg calcium (1,250 mg) tablet, Take 1 tablet by mouth 2 (two) times daily., Disp: , Rfl:     cetirizine (ZYRTEC) 10 MG tablet, Take 10 mg by mouth Daily., Disp: , Rfl:     diclofenac sodium (VOLTAREN ARTHRITIS PAIN) 1 % Gel, Apply 2 g topically 4 (four) times daily. Do not exceed 32 grams/day: do not to exceed 8 grams/day/single joint of upper extremities; do not to exceed 16 grams/day/single joint of lower extremities.  Please request refill of this medication from your PCP., Disp: 100 g, Rfl: 3    fluticasone-umeclidin-vilanter (TRELEGY ELLIPTA) 100-62.5-25 mcg DsDv, Inhale 1 puff into the lungs once daily., Disp: 60 each, Rfl: 6    furosemide (LASIX) 20 MG tablet, Take 1 tablet (20 mg total) by mouth daily as needed (swelling)., Disp: 90 tablet, Rfl: 1    losartan (COZAAR) 100 MG tablet, Take 1 tablet (100 mg total) by mouth once daily., Disp: 90 tablet, Rfl: 3    omeprazole (PRILOSEC) 20 MG capsule, Take 1 capsule (20 mg total) by mouth once daily., Disp: 90 capsule, Rfl: 1    rosuvastatin (CRESTOR) 40 MG Tab, Take 1 tablet (40 mg total) by mouth every evening., Disp: 90 tablet, Rfl: 3    triamcinolone acetonide 0.1% (KENALOG) 0.1 % cream,  Apply topically 2 (two) times daily as needed (rash/itching)., Disp: 80 g, Rfl: 0

## 2025-08-18 ENCOUNTER — PATIENT MESSAGE (OUTPATIENT)
Dept: NEPHROLOGY | Facility: CLINIC | Age: 75
End: 2025-08-18

## (undated) DEVICE — CLIP ENDO LIGATION LARGE CLIPS

## (undated) DEVICE — HEMOSTAT SURGICEL PWD 3G

## (undated) DEVICE — GLOVE PROTEXIS LTX MICRO  7.5

## (undated) DEVICE — NDL PNEUMO INSUFFLATI 120MM

## (undated) DEVICE — KIT SURGICAL TURNOVER

## (undated) DEVICE — MANIFOLD 4 PORT

## (undated) DEVICE — TROCAR ENDOPATH EXCEL DILATING

## (undated) DEVICE — BAG TISS RETRV MONARCH 10MM

## (undated) DEVICE — GOWN POLY REINF X-LONG 2XL

## (undated) DEVICE — ADHESIVE DERMABOND ADVANCED

## (undated) DEVICE — NDL HYPO REG 25G X 1 1/2

## (undated) DEVICE — TROCAR ENDOPATH ECEL

## (undated) DEVICE — GLOVE PROTEXIS LTX MICRO 8

## (undated) DEVICE — GLOVE PROTEXIS HYDROGEL SZ6.5

## (undated) DEVICE — KIT LAPAROSCOPY UNIVERSITY

## (undated) DEVICE — APPLICATOR CHLORAPREP ORN 26ML

## (undated) DEVICE — CANNULA ENDOPATH XCEL 5X100MM

## (undated) DEVICE — SYR 10CC LUER LOCK

## (undated) DEVICE — APPLICATOR SURGICEL ENDOSCOPIC

## (undated) DEVICE — ELECTRODE MEGADYNE L-WIRE 33CM

## (undated) DEVICE — SUT PDSII 4-0 PS-2 CLEAR MO

## (undated) DEVICE — GOWN POLY REINF BRTH SLV XL

## (undated) DEVICE — POSITIONER HEEL FOAM CONVOLTD

## (undated) DEVICE — SOL NORMAL USPCA 0.9%

## (undated) DEVICE — TOWEL OR DISP STRL BLUE 4/PK

## (undated) DEVICE — SOL IRRI STRL WATER 1000ML

## (undated) DEVICE — HANDLE DEVON RIGID OR LIGHT

## (undated) DEVICE — SUT 0 VICRYL / UR6 (J603)

## (undated) DEVICE — TROCAR ENDOPATH XCEL 12X100MM

## (undated) DEVICE — TROCAR LAPSCP XCEL 12MM 10CM